# Patient Record
Sex: FEMALE | Race: WHITE | Employment: OTHER | URBAN - NONMETROPOLITAN AREA
[De-identification: names, ages, dates, MRNs, and addresses within clinical notes are randomized per-mention and may not be internally consistent; named-entity substitution may affect disease eponyms.]

---

## 2018-06-04 ENCOUNTER — OFFICE VISIT (OUTPATIENT)
Dept: FAMILY MEDICINE CLINIC | Age: 63
End: 2018-06-04
Payer: COMMERCIAL

## 2018-06-04 VITALS
OXYGEN SATURATION: 97 % | WEIGHT: 186.6 LBS | TEMPERATURE: 97.3 F | SYSTOLIC BLOOD PRESSURE: 146 MMHG | BODY MASS INDEX: 34.34 KG/M2 | RESPIRATION RATE: 12 BRPM | DIASTOLIC BLOOD PRESSURE: 82 MMHG | HEIGHT: 62 IN | HEART RATE: 57 BPM

## 2018-06-04 DIAGNOSIS — C50.919: Primary | ICD-10-CM

## 2018-06-04 DIAGNOSIS — Z17.0 MALIGNANT NEOPLASM OF LOWER-OUTER QUADRANT OF LEFT BREAST OF FEMALE, ESTROGEN RECEPTOR POSITIVE (HCC): ICD-10-CM

## 2018-06-04 DIAGNOSIS — Z12.31 ENCOUNTER FOR SCREENING MAMMOGRAM FOR BREAST CANCER: ICD-10-CM

## 2018-06-04 DIAGNOSIS — R79.89 LOW VITAMIN D LEVEL: ICD-10-CM

## 2018-06-04 DIAGNOSIS — Z23 NEED FOR PROPHYLACTIC VACCINATION AND INOCULATION AGAINST VARICELLA: ICD-10-CM

## 2018-06-04 DIAGNOSIS — Z13.220 SCREENING CHOLESTEROL LEVEL: ICD-10-CM

## 2018-06-04 DIAGNOSIS — E16.2 HYPOGLYCEMIA: ICD-10-CM

## 2018-06-04 DIAGNOSIS — Z23 NEED FOR PROPHYLACTIC VACCINATION AGAINST DIPHTHERIA-TETANUS-PERTUSSIS (DTP): ICD-10-CM

## 2018-06-04 DIAGNOSIS — C50.512 MALIGNANT NEOPLASM OF LOWER-OUTER QUADRANT OF LEFT BREAST OF FEMALE, ESTROGEN RECEPTOR POSITIVE (HCC): ICD-10-CM

## 2018-06-04 DIAGNOSIS — I10 ESSENTIAL HYPERTENSION: ICD-10-CM

## 2018-06-04 DIAGNOSIS — E04.9 ENLARGED THYROID: ICD-10-CM

## 2018-06-04 DIAGNOSIS — R07.89 CHEST HEAVINESS: ICD-10-CM

## 2018-06-04 DIAGNOSIS — Z12.11 COLON CANCER SCREENING: ICD-10-CM

## 2018-06-04 DIAGNOSIS — Z00.00 WELLNESS EXAMINATION: ICD-10-CM

## 2018-06-04 LAB
ALBUMIN SERPL-MCNC: 4.4 G/DL (ref 3.5–5.1)
ALP BLD-CCNC: 91 U/L (ref 38–126)
ALT SERPL-CCNC: 15 U/L (ref 11–66)
ANION GAP SERPL CALCULATED.3IONS-SCNC: 12 MEQ/L (ref 8–16)
ANISOCYTOSIS: ABNORMAL
AST SERPL-CCNC: 17 U/L (ref 5–40)
BASOPHILS # BLD: 0.7 %
BASOPHILS ABSOLUTE: 0 THOU/MM3 (ref 0–0.1)
BILIRUB SERPL-MCNC: 0.4 MG/DL (ref 0.3–1.2)
BILIRUBIN DIRECT: < 0.2 MG/DL (ref 0–0.3)
BUN BLDV-MCNC: 9 MG/DL (ref 7–22)
CALCIUM SERPL-MCNC: 9 MG/DL (ref 8.5–10.5)
CHLORIDE BLD-SCNC: 106 MEQ/L (ref 98–111)
CHOLESTEROL, TOTAL: 167 MG/DL (ref 100–199)
CO2: 26 MEQ/L (ref 23–33)
CREAT SERPL-MCNC: 0.5 MG/DL (ref 0.4–1.2)
EOSINOPHIL # BLD: 3.1 %
EOSINOPHILS ABSOLUTE: 0.2 THOU/MM3 (ref 0–0.4)
GFR SERPL CREATININE-BSD FRML MDRD: > 90 ML/MIN/1.73M2
GLUCOSE BLD-MCNC: 84 MG/DL (ref 70–108)
HBA1C MFR BLD: 5.5 %
HCT VFR BLD CALC: 44 % (ref 37–47)
HDLC SERPL-MCNC: 71 MG/DL
HEMOGLOBIN: 14.4 GM/DL (ref 12–16)
LDL CHOLESTEROL CALCULATED: 80 MG/DL
LYMPHOCYTES # BLD: 31.9 %
LYMPHOCYTES ABSOLUTE: 1.9 THOU/MM3 (ref 1–4.8)
MAGNESIUM: 2.3 MG/DL (ref 1.6–2.4)
MCH RBC QN AUTO: 28.6 PG (ref 27–31)
MCHC RBC AUTO-ENTMCNC: 32.6 GM/DL (ref 33–37)
MCV RBC AUTO: 87.8 FL (ref 81–99)
MONOCYTES # BLD: 6.9 %
MONOCYTES ABSOLUTE: 0.4 THOU/MM3 (ref 0.4–1.3)
NUCLEATED RED BLOOD CELLS: 0 /100 WBC
PDW BLD-RTO: 14.5 % (ref 11.5–14.5)
PLATELET # BLD: 269 THOU/MM3 (ref 130–400)
PMV BLD AUTO: 8.9 FL (ref 7.4–10.4)
POTASSIUM SERPL-SCNC: 4.6 MEQ/L (ref 3.5–5.2)
RBC # BLD: 5.01 MILL/MM3 (ref 4.2–5.4)
SEG NEUTROPHILS: 57.4 %
SEGMENTED NEUTROPHILS ABSOLUTE COUNT: 3.5 THOU/MM3 (ref 1.8–7.7)
SODIUM BLD-SCNC: 144 MEQ/L (ref 135–145)
TOTAL PROTEIN: 7.6 G/DL (ref 6.1–8)
TRIGL SERPL-MCNC: 81 MG/DL (ref 0–199)
TSH SERPL DL<=0.05 MIU/L-ACNC: 2.45 UIU/ML (ref 0.4–4.2)
VITAMIN D 25-HYDROXY: 26 NG/ML (ref 30–100)
WBC # BLD: 6.1 THOU/MM3 (ref 4.8–10.8)

## 2018-06-04 PROCEDURE — 93000 ELECTROCARDIOGRAM COMPLETE: CPT | Performed by: FAMILY MEDICINE

## 2018-06-04 PROCEDURE — 36415 COLL VENOUS BLD VENIPUNCTURE: CPT | Performed by: FAMILY MEDICINE

## 2018-06-04 PROCEDURE — 99203 OFFICE O/P NEW LOW 30 MIN: CPT | Performed by: FAMILY MEDICINE

## 2018-06-04 PROCEDURE — 83036 HEMOGLOBIN GLYCOSYLATED A1C: CPT | Performed by: FAMILY MEDICINE

## 2018-06-04 RX ORDER — VITAMIN B COMPLEX
1 CAPSULE ORAL DAILY
COMMUNITY

## 2018-06-04 RX ORDER — VITAMIN E 268 MG
400 CAPSULE ORAL DAILY
COMMUNITY

## 2018-06-04 ASSESSMENT — ENCOUNTER SYMPTOMS
CONSTIPATION: 0
TROUBLE SWALLOWING: 0
ABDOMINAL PAIN: 0
SHORTNESS OF BREATH: 0
BLOOD IN STOOL: 0
VOMITING: 0
EYE PAIN: 0
NAUSEA: 0
COUGH: 0
DIARRHEA: 0

## 2018-06-04 ASSESSMENT — PATIENT HEALTH QUESTIONNAIRE - PHQ9
2. FEELING DOWN, DEPRESSED OR HOPELESS: 0
1. LITTLE INTEREST OR PLEASURE IN DOING THINGS: 0
SUM OF ALL RESPONSES TO PHQ QUESTIONS 1-9: 0
SUM OF ALL RESPONSES TO PHQ9 QUESTIONS 1 & 2: 0

## 2018-06-05 ENCOUNTER — TELEPHONE (OUTPATIENT)
Dept: FAMILY MEDICINE CLINIC | Age: 63
End: 2018-06-05

## 2018-06-06 LAB
THYROGLOBULIN: NORMAL
THYROID PEROXIDASE ANTIBODY: 0.5 IU/ML (ref 0–9)
THYROXINE (T4): 8 UG/DL (ref 4.5–12)

## 2018-06-07 ENCOUNTER — TELEPHONE (OUTPATIENT)
Dept: FAMILY MEDICINE CLINIC | Age: 63
End: 2018-06-07

## 2018-06-07 DIAGNOSIS — C50.919: ICD-10-CM

## 2018-06-07 DIAGNOSIS — Z12.31 ENCOUNTER FOR SCREENING MAMMOGRAM FOR BREAST CANCER: ICD-10-CM

## 2018-06-07 DIAGNOSIS — N64.4 BREAST PAIN: Primary | ICD-10-CM

## 2018-06-07 RX ORDER — LISINOPRIL 10 MG/1
10 TABLET ORAL DAILY
Qty: 30 TABLET | Refills: 3 | Status: SHIPPED | OUTPATIENT
Start: 2018-06-07 | End: 2018-07-02

## 2018-06-13 ENCOUNTER — TELEPHONE (OUTPATIENT)
Dept: FAMILY MEDICINE CLINIC | Age: 63
End: 2018-06-13
Payer: COMMERCIAL

## 2018-06-13 DIAGNOSIS — R19.5 POSITIVE OCCULT STOOL BLOOD TEST: Primary | ICD-10-CM

## 2018-06-13 DIAGNOSIS — Z12.11 COLON CANCER SCREENING: ICD-10-CM

## 2018-06-13 LAB
CONTROL: NORMAL
HEMOCCULT STL QL: NORMAL

## 2018-06-13 PROCEDURE — 82274 ASSAY TEST FOR BLOOD FECAL: CPT | Performed by: FAMILY MEDICINE

## 2018-06-14 ENCOUNTER — TELEPHONE (OUTPATIENT)
Dept: FAMILY MEDICINE CLINIC | Age: 63
End: 2018-06-14

## 2018-06-25 ENCOUNTER — OFFICE VISIT (OUTPATIENT)
Dept: FAMILY MEDICINE CLINIC | Age: 63
End: 2018-06-25
Payer: COMMERCIAL

## 2018-06-25 ENCOUNTER — HOSPITAL ENCOUNTER (OUTPATIENT)
Dept: WOMENS IMAGING | Age: 63
Discharge: HOME | End: 2018-06-25
Payer: COMMERCIAL

## 2018-06-25 ENCOUNTER — APPOINTMENT (OUTPATIENT)
Dept: WOMENS IMAGING | Age: 63
End: 2018-06-25
Payer: COMMERCIAL

## 2018-06-25 VITALS
BODY MASS INDEX: 33.28 KG/M2 | WEIGHT: 182.6 LBS | DIASTOLIC BLOOD PRESSURE: 79 MMHG | HEART RATE: 59 BPM | SYSTOLIC BLOOD PRESSURE: 138 MMHG

## 2018-06-25 DIAGNOSIS — I10 ESSENTIAL HYPERTENSION: ICD-10-CM

## 2018-06-25 DIAGNOSIS — N64.4 BREAST PAIN: ICD-10-CM

## 2018-06-25 DIAGNOSIS — C50.919: ICD-10-CM

## 2018-06-25 PROCEDURE — G0279 TOMOSYNTHESIS, MAMMO: HCPCS

## 2018-06-25 PROCEDURE — 99211 OFF/OP EST MAY X REQ PHY/QHP: CPT | Performed by: FAMILY MEDICINE

## 2018-06-26 ENCOUNTER — TELEPHONE (OUTPATIENT)
Dept: FAMILY MEDICINE CLINIC | Age: 63
End: 2018-06-26

## 2018-07-02 ENCOUNTER — OFFICE VISIT (OUTPATIENT)
Dept: FAMILY MEDICINE CLINIC | Age: 63
End: 2018-07-02
Payer: COMMERCIAL

## 2018-07-02 VITALS
TEMPERATURE: 97.5 F | OXYGEN SATURATION: 99 % | HEART RATE: 54 BPM | DIASTOLIC BLOOD PRESSURE: 75 MMHG | SYSTOLIC BLOOD PRESSURE: 132 MMHG | HEIGHT: 62 IN | BODY MASS INDEX: 33.82 KG/M2 | RESPIRATION RATE: 10 BRPM | WEIGHT: 183.8 LBS

## 2018-07-02 DIAGNOSIS — I10 ESSENTIAL HYPERTENSION: Primary | ICD-10-CM

## 2018-07-02 DIAGNOSIS — E04.9 ENLARGED THYROID: ICD-10-CM

## 2018-07-02 PROCEDURE — G8427 DOCREV CUR MEDS BY ELIG CLIN: HCPCS | Performed by: FAMILY MEDICINE

## 2018-07-02 PROCEDURE — 3017F COLORECTAL CA SCREEN DOC REV: CPT | Performed by: FAMILY MEDICINE

## 2018-07-02 PROCEDURE — 99213 OFFICE O/P EST LOW 20 MIN: CPT | Performed by: FAMILY MEDICINE

## 2018-07-02 PROCEDURE — G8417 CALC BMI ABV UP PARAM F/U: HCPCS | Performed by: FAMILY MEDICINE

## 2018-07-02 PROCEDURE — 1036F TOBACCO NON-USER: CPT | Performed by: FAMILY MEDICINE

## 2018-07-02 RX ORDER — LOSARTAN POTASSIUM 25 MG/1
25 TABLET ORAL DAILY
Qty: 60 TABLET | Refills: 3 | Status: SHIPPED | OUTPATIENT
Start: 2018-07-02 | End: 2018-11-05 | Stop reason: SDUPTHER

## 2018-07-02 ASSESSMENT — ENCOUNTER SYMPTOMS
DIARRHEA: 0
EYE PAIN: 0
BLOOD IN STOOL: 0
SHORTNESS OF BREATH: 0
VOMITING: 0
COUGH: 1
TROUBLE SWALLOWING: 0
CONSTIPATION: 0
ABDOMINAL PAIN: 0
NAUSEA: 0

## 2018-07-02 NOTE — PROGRESS NOTES
06[birth year]14/1975    Shingles Vaccine (1 of 2 - 2 Dose Series) 06/14/2004    Flu vaccine (1) 09/01/2018    Potassium monitoring  06/04/2019    Creatinine monitoring  06/04/2019    Colon Cancer Screen FIT/FOBT  06/13/2019    Breast cancer screen  06/25/2020    Lipid screen  06/04/2023       Subjective:      Review of Systems   Constitutional: Negative for chills, fatigue and fever. HENT: Negative for ear pain, postnasal drip and trouble swallowing. Eyes: Negative for pain and visual disturbance. Respiratory: Positive for cough. Negative for shortness of breath. Cardiovascular: Negative for chest pain and palpitations. Gastrointestinal: Negative for abdominal pain, blood in stool, constipation, diarrhea, nausea and vomiting. Skin: Negative for rash. Neurological: Negative for headaches. Objective:     Vitals:    07/02/18 0939   BP: 132/75   Site: Left Arm   Position: Sitting   Cuff Size: Large Adult   Pulse: 54   Resp: 10   Temp: 97.5 °F (36.4 °C)   TempSrc: Oral   SpO2: 99%   Weight: 183 lb 12.8 oz (83.4 kg)   Height: 5' 2\" (1.575 m)       Body mass index is 33.62 kg/m². Wt Readings from Last 3 Encounters:   07/02/18 183 lb 12.8 oz (83.4 kg)   06/25/18 182 lb 9.6 oz (82.8 kg)   06/04/18 186 lb 9.6 oz (84.6 kg)     BP Readings from Last 3 Encounters:   07/02/18 132/75   06/25/18 138/79   06/04/18 (!) 146/82       Physical Exam   Constitutional: She is oriented to person, place, and time. She appears well-developed and well-nourished. No distress. HENT:   Head: Normocephalic and atraumatic. Right Ear: External ear normal.   Left Ear: External ear normal.   Eyes: Conjunctivae and EOM are normal. Right eye exhibits no discharge. Left eye exhibits no discharge. No scleral icterus. Neck: Normal range of motion. Pulmonary/Chest: Effort normal.   Musculoskeletal: She exhibits no edema. Neurological: She is alert and oriented to person, place, and time. No cranial nerve deficit.    Skin: Skin is warm and dry. No rash noted. She is not diaphoretic. No erythema. Psychiatric: She has a normal mood and affect. Her behavior is normal. Judgment and thought content normal.   Nursing note and vitals reviewed. Lab Results   Component Value Date    WBC 6.1 06/04/2018    HGB 14.4 06/04/2018    HCT 44.0 06/04/2018     06/04/2018    CHOL 167 06/04/2018    TRIG 81 06/04/2018    HDL 71 06/04/2018    LDLCALC 80 06/04/2018    AST 17 06/04/2018     06/04/2018    K 4.6 06/04/2018     06/04/2018    CREATININE 0.5 06/04/2018    BUN 9 06/04/2018    CO2 26 06/04/2018    TSH 2.450 06/04/2018    LABA1C 5.5 06/04/2018    LABGLOM >90 06/04/2018    MG 2.3 06/04/2018    CALCIUM 9.0 06/04/2018    VITD25 26 (L) 06/04/2018       Imaging Results:    [facility] Digital Diagnostic W Or Wo Cad Bilateral    Result Date: 6/25/2018  IMPRESSION: Mammogram BI-RADS: 3: Probably Benign The irregular equal density architectural distortion in the left breast resembles a post surgical scar and appears probably benign. A follow-up in 6 months is recommended. A follow-up left mammogram in 6 months is recommended to demonstrate stability. The patient has been or will be contacted. #[id number] - [facility] DIGITAL DIAGNOSTIC W OR WO CAD BILATERAL BILATERAL DIGITAL DIAGNOSTIC MAMMOGRAM 3D/2D WITH CAD: 6/25/2018 CLINICAL: Tomosynthesis. Personal history of breast cancer. Meets  criteria for genetic evaluation and has been offered information  for possible referral. Pain bilateral breasts. No prior exams were available for comparison. The tissue of both breasts is predominantly fatty. Current study was also evaluated with a Computer Aided Detection (CAD) system. There is irregular equal density architectural distortion with an  indistinct margin in the left breast posterior depth upper region seen on the mediolateral oblique view only.   No other significant masses, calcifications, or other findings are seen in either breast. River Leon M.D.          Westley Baconock 09:86:34  Imaging Technologist: Shaheen Ray RT(R), [id number] [facility] sent: 6 Month Followup Recommended           Assessment:      Diagnosis Orders   1. Essential hypertension  losartan (COZAAR) 25 MG tablet    Basic Metabolic Panel   2. Enlarged thyroid         Plan: Will switch to cozaar from the lisinopril will start at 25 mg and then can go up to 50mg if the 25 are not helping - will    Will try the ketogenic diet - no carbs     Will stay on the magnesium     Can do the meal replacement with the B vitamins    Will check the vit D dose you are taking - will double the dose or at least take 3,000 more a day     a little more fish oil when you are eating the nuts and seeds    Will see you back in 4 months and will have you check in with the blood pressures over the phone       Return in about 4 months (around 11/2/2018) for follow up chronic conditions. Orders Placed:  Orders Placed This Encounter   Procedures    Basic Metabolic Panel     Medications Prescribed:  Orders Placed This Encounter   Medications    losartan (COZAAR) 25 MG tablet     Sig: Take 1 tablet by mouth daily Will go up to 2 a day if the blood pressure is not below 120/80     Dispense:  60 tablet     Refill:  3       No future appointments. Patient given educational materials - see patient instructions. Discussed use, benefit, and side effects of prescribed medications. All patient questions answered. Pt voiced understanding. Reviewed health maintenance. Instructed to continue current medications, diet and exercise. Patient agreed with treatment plan. Follow up as directed.      Electronically signed by Candi Chew DO on 7/2/2018 at 10:35 AM

## 2018-07-02 NOTE — PATIENT INSTRUCTIONS
help?  Call 911 anytime you think you may need emergency care. For example, call if:    · You passed out (lost consciousness).    Call your doctor now or seek immediate medical care if:    · You have new or worse nausea and vomiting.     · You have much less urine than normal, or you have no urine.     · You are feeling confused or cannot think clearly.     · You have new or more blood in your urine.     · You have new swelling.     · You are dizzy or lightheaded, or you feel like you may faint.    Watch closely for changes in your health, and be sure to contact your doctor if:    · You do not get better as expected. Where can you learn more? Go to [email or web address] and sign in to your [employer] account. Enter V484 in the Tiqets box to learn more about \"Kidney Disease and High Blood Pressure: Care Instructions. \"     If you do not have an account, please click on the \"Sign Up Now\" link. Current as of: May 12, 2017  Content Version: 11.6  © 4828-3658 [employer]. Care instructions adapted under license by [employer] ([facility]). If you have questions about a medical condition or this instruction, always ask your healthcare professional. [address] any warranty or liability for your use of this information. Patient Education            Current as of: October 5, 2017  Content Version: 11.6  © 6426-0651 [employer]. Care instructions adapted under license by [employer] ([facility]). If you have questions about a medical condition or this instruction, always ask your healthcare professional. [address] any warranty or liability for your use of this information. Patient Education            Current as of: October 5, 2017  Content Version: 11.6  © 4377-3741 [employer]. Care instructions adapted under license by [employer] ([facility]).  If you have questions about a medical condition or this instruction, always ask

## 2018-08-31 ENCOUNTER — CLINICAL DOCUMENTATION (OUTPATIENT)
Dept: OTHER | Facility: CLINIC | Age: 63
End: 2018-08-31

## 2018-11-05 ENCOUNTER — OFFICE VISIT (OUTPATIENT)
Dept: FAMILY MEDICINE CLINIC | Age: 63
End: 2018-11-05
Payer: COMMERCIAL

## 2018-11-05 VITALS
RESPIRATION RATE: 12 BRPM | TEMPERATURE: 97.7 F | HEART RATE: 73 BPM | WEIGHT: 176.2 LBS | SYSTOLIC BLOOD PRESSURE: 124 MMHG | OXYGEN SATURATION: 98 % | DIASTOLIC BLOOD PRESSURE: 64 MMHG | HEIGHT: 62 IN | BODY MASS INDEX: 32.42 KG/M2

## 2018-11-05 DIAGNOSIS — I10 ESSENTIAL HYPERTENSION: ICD-10-CM

## 2018-11-05 PROCEDURE — G8427 DOCREV CUR MEDS BY ELIG CLIN: HCPCS | Performed by: FAMILY MEDICINE

## 2018-11-05 PROCEDURE — G8484 FLU IMMUNIZE NO ADMIN: HCPCS | Performed by: FAMILY MEDICINE

## 2018-11-05 PROCEDURE — G8417 CALC BMI ABV UP PARAM F/U: HCPCS | Performed by: FAMILY MEDICINE

## 2018-11-05 PROCEDURE — 99213 OFFICE O/P EST LOW 20 MIN: CPT | Performed by: FAMILY MEDICINE

## 2018-11-05 PROCEDURE — 1036F TOBACCO NON-USER: CPT | Performed by: FAMILY MEDICINE

## 2018-11-05 PROCEDURE — 3017F COLORECTAL CA SCREEN DOC REV: CPT | Performed by: FAMILY MEDICINE

## 2018-11-05 RX ORDER — LOSARTAN POTASSIUM 25 MG/1
25 TABLET ORAL DAILY
Qty: 90 TABLET | Refills: 3 | Status: SHIPPED | OUTPATIENT
Start: 2018-11-05 | End: 2019-11-18 | Stop reason: SDUPTHER

## 2018-11-05 ASSESSMENT — ENCOUNTER SYMPTOMS
SHORTNESS OF BREATH: 0
ABDOMINAL PAIN: 0
NAUSEA: 0
VOMITING: 0
CONSTIPATION: 0
COUGH: 0
BLOOD IN STOOL: 0
EYE PAIN: 0
DIARRHEA: 0
TROUBLE SWALLOWING: 0

## 2019-07-29 ENCOUNTER — OFFICE VISIT (OUTPATIENT)
Dept: FAMILY MEDICINE CLINIC | Age: 64
End: 2019-07-29
Payer: MEDICARE

## 2019-07-29 VITALS
OXYGEN SATURATION: 99 % | SYSTOLIC BLOOD PRESSURE: 130 MMHG | TEMPERATURE: 97.7 F | RESPIRATION RATE: 12 BRPM | DIASTOLIC BLOOD PRESSURE: 82 MMHG | WEIGHT: 185.8 LBS | HEIGHT: 62 IN | HEART RATE: 78 BPM | BODY MASS INDEX: 34.19 KG/M2

## 2019-07-29 DIAGNOSIS — K90.9 INTESTINAL MALABSORPTION, UNSPECIFIED TYPE: ICD-10-CM

## 2019-07-29 DIAGNOSIS — I10 ESSENTIAL HYPERTENSION: Primary | ICD-10-CM

## 2019-07-29 DIAGNOSIS — Z00.00 ROUTINE GENERAL MEDICAL EXAMINATION AT A HEALTH CARE FACILITY: ICD-10-CM

## 2019-07-29 DIAGNOSIS — Z11.59 NEED FOR HEPATITIS C SCREENING TEST: ICD-10-CM

## 2019-07-29 DIAGNOSIS — Z86.010 HISTORY OF COLONIC POLYPS: ICD-10-CM

## 2019-07-29 DIAGNOSIS — C50.919: ICD-10-CM

## 2019-07-29 DIAGNOSIS — Z13.220 SCREENING, LIPID: ICD-10-CM

## 2019-07-29 DIAGNOSIS — Z11.4 ENCOUNTER FOR SCREENING FOR HIV: ICD-10-CM

## 2019-07-29 DIAGNOSIS — Z78.0 MENOPAUSE: ICD-10-CM

## 2019-07-29 DIAGNOSIS — M51.9 LUMBAR DISC DISEASE: ICD-10-CM

## 2019-07-29 PROBLEM — Z86.0100 HISTORY OF COLONIC POLYPS: Status: ACTIVE | Noted: 2019-07-29

## 2019-07-29 PROCEDURE — G0402 INITIAL PREVENTIVE EXAM: HCPCS | Performed by: FAMILY MEDICINE

## 2019-07-29 PROCEDURE — 4040F PNEUMOC VAC/ADMIN/RCVD: CPT | Performed by: FAMILY MEDICINE

## 2019-07-29 PROCEDURE — 3017F COLORECTAL CA SCREEN DOC REV: CPT | Performed by: FAMILY MEDICINE

## 2019-07-29 PROCEDURE — 1123F ACP DISCUSS/DSCN MKR DOCD: CPT | Performed by: FAMILY MEDICINE

## 2019-07-29 SDOH — HEALTH STABILITY: MENTAL HEALTH: HOW OFTEN DO YOU HAVE A DRINK CONTAINING ALCOHOL?: MONTHLY OR LESS

## 2019-07-29 SDOH — HEALTH STABILITY: MENTAL HEALTH: HOW MANY STANDARD DRINKS CONTAINING ALCOHOL DO YOU HAVE ON A TYPICAL DAY?: 1 OR 2

## 2019-07-29 ASSESSMENT — ENCOUNTER SYMPTOMS
COUGH: 0
DIARRHEA: 0
ABDOMINAL PAIN: 0
SHORTNESS OF BREATH: 0
NAUSEA: 0
EYE PAIN: 0
TROUBLE SWALLOWING: 0
CONSTIPATION: 0
BACK PAIN: 1
BLOOD IN STOOL: 0
VOMITING: 0

## 2019-07-29 ASSESSMENT — LIFESTYLE VARIABLES
HOW OFTEN DURING THE LAST YEAR HAVE YOU FAILED TO DO WHAT WAS NORMALLY EXPECTED FROM YOU BECAUSE OF DRINKING: 0
HOW OFTEN DURING THE LAST YEAR HAVE YOU HAD A FEELING OF GUILT OR REMORSE AFTER DRINKING: 0
HAS A RELATIVE, FRIEND, DOCTOR, OR ANOTHER HEALTH PROFESSIONAL EXPRESSED CONCERN ABOUT YOUR DRINKING OR SUGGESTED YOU CUT DOWN: 0
HOW OFTEN DO YOU HAVE A DRINK CONTAINING ALCOHOL: 2
HAVE YOU OR SOMEONE ELSE BEEN INJURED AS A RESULT OF YOUR DRINKING: 0
HOW OFTEN DO YOU HAVE SIX OR MORE DRINKS ON ONE OCCASION: 0
AUDIT-C TOTAL SCORE: 2
HOW OFTEN DURING THE LAST YEAR HAVE YOU NEEDED AN ALCOHOLIC DRINK FIRST THING IN THE MORNING TO GET YOURSELF GOING AFTER A NIGHT OF HEAVY DRINKING: 0
HOW MANY STANDARD DRINKS CONTAINING ALCOHOL DO YOU HAVE ON A TYPICAL DAY: 0
HOW OFTEN DURING THE LAST YEAR HAVE YOU FOUND THAT YOU WERE NOT ABLE TO STOP DRINKING ONCE YOU HAD STARTED: 0

## 2019-07-29 ASSESSMENT — PATIENT HEALTH QUESTIONNAIRE - PHQ9
SUM OF ALL RESPONSES TO PHQ QUESTIONS 1-9: 0
SUM OF ALL RESPONSES TO PHQ QUESTIONS 1-9: 0

## 2019-08-19 ENCOUNTER — HOSPITAL ENCOUNTER (OUTPATIENT)
Dept: WOMENS IMAGING | Age: 64
Discharge: HOME | End: 2019-08-19
Payer: MEDICARE

## 2019-08-19 ENCOUNTER — NURSE ONLY (OUTPATIENT)
Dept: LAB | Age: 64
End: 2019-08-19

## 2019-08-19 DIAGNOSIS — Z11.59 NEED FOR HEPATITIS C SCREENING TEST: ICD-10-CM

## 2019-08-19 DIAGNOSIS — K90.9 INTESTINAL MALABSORPTION, UNSPECIFIED TYPE: ICD-10-CM

## 2019-08-19 DIAGNOSIS — Z13.220 SCREENING, LIPID: ICD-10-CM

## 2019-08-19 DIAGNOSIS — Z78.0 MENOPAUSE: ICD-10-CM

## 2019-08-19 DIAGNOSIS — Z11.4 ENCOUNTER FOR SCREENING FOR HIV: ICD-10-CM

## 2019-08-19 DIAGNOSIS — I10 ESSENTIAL HYPERTENSION: ICD-10-CM

## 2019-08-19 LAB
ALBUMIN SERPL-MCNC: 4.3 G/DL (ref 3.5–5.1)
ALP BLD-CCNC: 89 U/L (ref 38–126)
ALT SERPL-CCNC: 12 U/L (ref 11–66)
ANION GAP SERPL CALCULATED.3IONS-SCNC: 11 MEQ/L (ref 8–16)
AST SERPL-CCNC: 16 U/L (ref 5–40)
BASOPHILS # BLD: 1.1 %
BASOPHILS ABSOLUTE: 0.1 THOU/MM3 (ref 0–0.1)
BILIRUB SERPL-MCNC: 0.4 MG/DL (ref 0.3–1.2)
BILIRUBIN DIRECT: < 0.2 MG/DL (ref 0–0.3)
BUN BLDV-MCNC: 7 MG/DL (ref 7–22)
CALCIUM SERPL-MCNC: 9 MG/DL (ref 8.5–10.5)
CHLORIDE BLD-SCNC: 103 MEQ/L (ref 98–111)
CHOLESTEROL, TOTAL: 171 MG/DL (ref 100–199)
CO2: 26 MEQ/L (ref 23–33)
CREAT SERPL-MCNC: 0.6 MG/DL (ref 0.4–1.2)
EOSINOPHIL # BLD: 3 %
EOSINOPHILS ABSOLUTE: 0.2 THOU/MM3 (ref 0–0.4)
ERYTHROCYTE [DISTWIDTH] IN BLOOD BY AUTOMATED COUNT: 13.4 % (ref 11.5–14.5)
ERYTHROCYTE [DISTWIDTH] IN BLOOD BY AUTOMATED COUNT: 45.1 FL (ref 35–45)
GFR SERPL CREATININE-BSD FRML MDRD: > 90 ML/MIN/1.73M2
GLUCOSE BLD-MCNC: 77 MG/DL (ref 70–108)
HCT VFR BLD CALC: 43 % (ref 37–47)
HDLC SERPL-MCNC: 67 MG/DL
HEMOGLOBIN: 13.5 GM/DL (ref 12–16)
HEPATITIS C ANTIBODY: NEGATIVE
IMMATURE GRANS (ABS): 0.02 THOU/MM3 (ref 0–0.07)
IMMATURE GRANULOCYTES: 0 %
LDL CHOLESTEROL CALCULATED: 88 MG/DL
LYMPHOCYTES # BLD: 34.7 %
LYMPHOCYTES ABSOLUTE: 2.3 THOU/MM3 (ref 1–4.8)
MAGNESIUM: 2.2 MG/DL (ref 1.6–2.4)
MCH RBC QN AUTO: 28.5 PG (ref 26–33)
MCHC RBC AUTO-ENTMCNC: 31.4 GM/DL (ref 32.2–35.5)
MCV RBC AUTO: 90.7 FL (ref 81–99)
MONOCYTES # BLD: 8.6 %
MONOCYTES ABSOLUTE: 0.6 THOU/MM3 (ref 0.4–1.3)
NUCLEATED RED BLOOD CELLS: 0 /100 WBC
PLATELET # BLD: 273 THOU/MM3 (ref 130–400)
PMV BLD AUTO: 9.7 FL (ref 9.4–12.4)
POTASSIUM SERPL-SCNC: 3.9 MEQ/L (ref 3.5–5.2)
RBC # BLD: 4.74 MILL/MM3 (ref 4.2–5.4)
SEG NEUTROPHILS: 52.3 %
SEGMENTED NEUTROPHILS ABSOLUTE COUNT: 3.5 THOU/MM3 (ref 1.8–7.7)
SODIUM BLD-SCNC: 140 MEQ/L (ref 135–145)
TOTAL PROTEIN: 7.2 G/DL (ref 6.1–8)
TRIGL SERPL-MCNC: 78 MG/DL (ref 0–199)
VITAMIN D 25-HYDROXY: 25 NG/ML (ref 30–100)
WBC # BLD: 6.6 THOU/MM3 (ref 4.8–10.8)

## 2019-08-19 PROCEDURE — 77080 DXA BONE DENSITY AXIAL: CPT

## 2019-08-22 LAB — HIV-2 AB: NEGATIVE

## 2019-08-26 ENCOUNTER — TELEPHONE (OUTPATIENT)
Dept: FAMILY MEDICINE CLINIC | Age: 64
End: 2019-08-26

## 2019-08-26 NOTE — TELEPHONE ENCOUNTER
----- Message from Iggy Cifuentes DO sent at 8/21/2019  8:53 PM EDT -----  The dexa does show osteopenia. Weight lifting and vit D and calcium are recommended - we could do a medication for the thinning bones also . Manuel Gould

## 2019-11-18 DIAGNOSIS — I10 ESSENTIAL HYPERTENSION: ICD-10-CM

## 2019-11-18 RX ORDER — LOSARTAN POTASSIUM 25 MG/1
TABLET ORAL
Qty: 90 TABLET | Refills: 0 | Status: SHIPPED | OUTPATIENT
Start: 2019-11-18 | End: 2020-01-06 | Stop reason: SDUPTHER

## 2020-01-06 ENCOUNTER — TELEPHONE (OUTPATIENT)
Dept: FAMILY MEDICINE CLINIC | Age: 65
End: 2020-01-06

## 2020-01-06 RX ORDER — LOSARTAN POTASSIUM 25 MG/1
TABLET ORAL
Qty: 90 TABLET | Refills: 3 | Status: SHIPPED | OUTPATIENT
Start: 2020-01-06 | End: 2020-02-10 | Stop reason: SDUPTHER

## 2020-02-10 ENCOUNTER — OFFICE VISIT (OUTPATIENT)
Dept: FAMILY MEDICINE CLINIC | Age: 65
End: 2020-02-10
Payer: MEDICARE

## 2020-02-10 VITALS
HEART RATE: 68 BPM | TEMPERATURE: 98 F | BODY MASS INDEX: 33.13 KG/M2 | DIASTOLIC BLOOD PRESSURE: 82 MMHG | OXYGEN SATURATION: 98 % | HEIGHT: 63 IN | WEIGHT: 187 LBS | SYSTOLIC BLOOD PRESSURE: 122 MMHG

## 2020-02-10 PROBLEM — M85.89 OSTEOPENIA OF MULTIPLE SITES: Status: ACTIVE | Noted: 2020-02-10

## 2020-02-10 PROCEDURE — 99214 OFFICE O/P EST MOD 30 MIN: CPT | Performed by: FAMILY MEDICINE

## 2020-02-10 RX ORDER — LOSARTAN POTASSIUM 50 MG/1
50 TABLET ORAL DAILY
Qty: 90 TABLET | Refills: 1 | Status: SHIPPED | OUTPATIENT
Start: 2020-02-10 | End: 2020-08-10 | Stop reason: SDUPTHER

## 2020-02-10 SDOH — ECONOMIC STABILITY: FOOD INSECURITY: WITHIN THE PAST 12 MONTHS, THE FOOD YOU BOUGHT JUST DIDN'T LAST AND YOU DIDN'T HAVE MONEY TO GET MORE.: NEVER TRUE

## 2020-02-10 SDOH — ECONOMIC STABILITY: FOOD INSECURITY: WITHIN THE PAST 12 MONTHS, YOU WORRIED THAT YOUR FOOD WOULD RUN OUT BEFORE YOU GOT MONEY TO BUY MORE.: NEVER TRUE

## 2020-02-10 SDOH — ECONOMIC STABILITY: TRANSPORTATION INSECURITY
IN THE PAST 12 MONTHS, HAS THE LACK OF TRANSPORTATION KEPT YOU FROM MEDICAL APPOINTMENTS OR FROM GETTING MEDICATIONS?: NO

## 2020-02-10 SDOH — ECONOMIC STABILITY: INCOME INSECURITY: HOW HARD IS IT FOR YOU TO PAY FOR THE VERY BASICS LIKE FOOD, HOUSING, MEDICAL CARE, AND HEATING?: NOT HARD AT ALL

## 2020-02-10 SDOH — ECONOMIC STABILITY: TRANSPORTATION INSECURITY
IN THE PAST 12 MONTHS, HAS LACK OF TRANSPORTATION KEPT YOU FROM MEETINGS, WORK, OR FROM GETTING THINGS NEEDED FOR DAILY LIVING?: NO

## 2020-02-10 ASSESSMENT — PATIENT HEALTH QUESTIONNAIRE - PHQ9
1. LITTLE INTEREST OR PLEASURE IN DOING THINGS: 1
SUM OF ALL RESPONSES TO PHQ QUESTIONS 1-9: 2
SUM OF ALL RESPONSES TO PHQ9 QUESTIONS 1 & 2: 2
SUM OF ALL RESPONSES TO PHQ QUESTIONS 1-9: 2
2. FEELING DOWN, DEPRESSED OR HOPELESS: 1

## 2020-02-10 ASSESSMENT — ENCOUNTER SYMPTOMS
ABDOMINAL PAIN: 0
TROUBLE SWALLOWING: 0
COUGH: 0
SHORTNESS OF BREATH: 0
CONSTIPATION: 0
NAUSEA: 0
DIARRHEA: 0
EYE PAIN: 0
BLOOD IN STOOL: 0
VOMITING: 0
BACK PAIN: 1

## 2020-02-10 NOTE — PROGRESS NOTES
[id number] [facility] Raymon BLAKE [address] [address]  Dept: [phone]  Dept Fax: [phone]  Loc: [address] [place] [address] Maintenance Due   Topic Date Due    DTaP/Tdap/Td vaccine (1 - Tdap) 06/14/1965    Cervical cancer screen  06/14/1975    Pneumococcal 65+ years Vaccine (1 of 1 - PPSV23) 06/14/2019   REFUSED     Flu vaccine (1) 09/01/2019           Patient:  López More  Visit Date: 2/10/2020    ANTICIPATORY GUIDANCE : ADULT     WELL QUESTIONS  1. How many cups of caffeine do you drink per day? ALOT cups of soda and ALOT cups of coffee   2. Do you exercise a minimum of 30 minutes per day, above normal activity? No, on average the patient performs 15 minutes of exercise per day    3. Do you eat a minimum of 8-10 servings of fruits and vegetables per day? No, on average the patient consumes 3 servings of fruits and vegetables per day  4. Do you drink alcohol? No  5.  How many oz of water do you drink per day?  (64 oz per day recommended) 32 OZ    EDUCATION PROVIDED  Discussed and/or Handout Given on the following items:            [] Caffeine Use: Limit to 2 cups per day   (400mg of caffeine a day)     [] Exercise: Ideal 30 minutes per day, above normal activity              [] Eating Fruits and Vegetables: Studies show 8-10 servings per day decrease BP  [] Alcohol: Ideal maximum of one cup per day for females and two cups per day for a male         López More is a 71 y.o. female who presents today for:  Chief Complaint   Patient presents with    6 Month Follow-Up    Hypertension       Goals      Blood Pressure < 140/90      Exercise 3x per week (30 min per time)           HPI:     HPI     DOING OK overall - does tend to lose and gain 10 pounds at a time    The cozaar is doing fine taking 2 a day of them to keep the bp down b- takes it every few weeks - usually 120s/80s - as high as 137     Taking the magnesium for the bp also    The back pain is on and off - if she uses it more - it hurts more and she uses the aleve    Has some numbness in her leg - worse at different parts and better other times    Will occasionally drag the left leg and scuff it when walking - but it gets better and worse    taking 50 mg of the DHEA at a time every other day or so she is exhausted all the time - can't get enough sleep now - was better in [place] with the sunshine but back again    No question data found.     Past Medical History:   Diagnosis Date    Cancer [facility])     breast      Past Surgical History:   Procedure Laterality Date    APPENDECTOMY      BREAST LUMPECTOMY       SECTION      x 2    TUBAL LIGATION      WISDOM TOOTH EXTRACTION       Family History   Problem Relation Age of Onset    Heart Disease Mother     Heart Disease Father     Cancer Sister     High Blood Pressure Sister     Obesity Brother     Diabetes Brother     High Blood Pressure Brother     Cancer Sister         melanoma    High Blood Pressure Sister     Substance Abuse Sister         smoker    High Blood Pressure Sister     High Blood Pressure Brother     Heart Attack Brother      Social History     Tobacco Use    Smoking status: Former Smoker     Packs/day: 0.02     Years: 0.10     Pack years: 0.00     Types: Cigarettes     Last attempt to quit: 1972     Years since quittin.6    Smokeless tobacco: Former User   Substance Use Topics    Alcohol use: Yes     Frequency: Monthly or less     Drinks per session: 1 or 2     Comment: occassionally      Current Outpatient Medications   Medication Sig Dispense Refill    MAGNESIUM PO Take 400 mg by mouth daily      losartan (COZAAR) 25 MG tablet TAKE 1 TABLET BY MOUTH DAILY WILL GO UP TO 2 TABLETS A DAY IF THE BLOOD PRESSURE IS NOT BELOW 120/80 (Patient taking differently: Take 50 mg by mouth daily TAKE 1 TABLET BY MOUTH DAILY WILL GO UP TO 2 TABLETS A DAY IF THE BLOOD PRESSURE IS NOT Skin: Negative for rash. Neurological: Negative for headaches. Psychiatric/Behavioral: Negative for agitation. Objective:     Vitals:    02/10/20 1127   BP: 122/82   Site: Left Upper Arm   Position: Sitting   Cuff Size: Medium Adult   Pulse: 68   Temp: 98 °F (36.7 °C)   TempSrc: Oral   SpO2: 98%   Weight: 187 lb (84.8 kg)   Height: 5' 3\" (1.6 m)       Body mass index is 33.13 kg/m². Wt Readings from Last 3 Encounters:   02/10/20 187 lb (84.8 kg)   07/29/19 185 lb 12.8 oz (84.3 kg)   11/05/18 176 lb 3.2 oz (79.9 kg)     BP Readings from Last 3 Encounters:   02/10/20 122/82   07/29/19 130/82   11/05/18 124/64       Physical Exam  Vitals signs and nursing note reviewed. Constitutional:       General: She is not in acute distress. Appearance: She is well-developed. She is not diaphoretic. HENT:      Head: Normocephalic and atraumatic. Right Ear: External ear normal.      Left Ear: External ear normal.   Eyes:      General: No scleral icterus. Right eye: No discharge. Left eye: No discharge. Conjunctiva/sclera: Conjunctivae normal.   Neck:      Musculoskeletal: Normal range of motion. Cardiovascular:      Rate and Rhythm: Normal rate and regular rhythm. Heart sounds: Normal heart sounds. No murmur. Pulmonary:      Effort: Pulmonary effort is normal.      Breath sounds: Normal breath sounds. Skin:     General: Skin is warm and dry. Findings: No erythema or rash. Neurological:      Mental Status: She is alert and oriented to person, place, and time. Cranial Nerves: No cranial nerve deficit. Psychiatric:         Behavior: Behavior normal.         Thought Content:  Thought content normal.         Judgment: Judgment normal.           Lab Results   Component Value Date    WBC 6.6 08/19/2019    HGB 13.5 08/19/2019    HCT 43.0 08/19/2019     08/19/2019    CHOL 171 08/19/2019    TRIG 78 08/19/2019    HDL 67 08/19/2019    LDLCALC 88 08/19/2019 BMD in 1-2    years and patient consult w/primary care provider are    recommended.         Jatinder Tompkins M.D., rd/perfecto:8/19/2019 22:30:23         Imaging Technologist: Yuli LUGO)(LARRY), [facility]             Assessment:      Diagnosis Orders   1. Lumbar disc disease     2. Essential hypertension  losartan (COZAAR) 25 MG tablet   3. Osteopenia of multiple sites     4. Enlarged thyroid         Plan:         Can order the PT for the back and if that does not help will do the MRI of the lumbar and may send for injections    Will refill the meds for the blood pressure    Will add back the yoga for the low back and exercises before the PT    Recheck blood work in august and will have the dhea checked at that time    Will recheck the dexa  In 2021 for osteopenia    Can check on the gluten sensitivity - and keep watching the thyroid and will check it in the fall with bloodwork    will do some bloodwork in the future       No follow-ups on file. Orders Placed:  No orders of the defined types were placed in this encounter. Medications Prescribed:  No orders of the defined types were placed in this encounter. No future appointments. Patient given educational materials - see patient instructions. Discussed use, benefit, and sideeffects of prescribed medications. All patient questions answered. Pt voiced understanding. Reviewed health maintenance. Instructed to continue current medications, diet and exercise. Patient agreed with treatment plan. Follow up as directed. I was present for the key portions of the exam and history and confirmed all areas of the note with the patient, staff and the student.       Electronically signed by Nunu Lorenzo DO on 2/10/2020 at 12:17 PM

## 2020-02-10 NOTE — PATIENT INSTRUCTIONS
Thank you   1. Thank you for trusting us with your healthcare needs. You may receive a survey regarding today's visit. It would help us out if you would take a few moments to provide your feedback. We value your input. 2. Please bring in ALL medications BOTTLES, including inhalers, herbal supplements, over the counter, prescribed & non-prescribed medicine. The office would like actual medication bottles and a list.   3. Please note our OFFICE POLICIES:   a. Prior to getting your labs drawn, please check with your insurance company for benefits and eligibility of lab services. Often, insurance companies cover certain tests for preventative visits only. It is patient's responsibility to see what is covered. b. We are unable to change a diagnosis after the test has been performed. c. Lab orders will not be re-printed. Please hold onto your original lab orders and take them to your lab to be completed. d. If you no show your scheduled appointment three times, you will be dismissed from this practice. e. Sapna Xiomara must be completed 24 hours prior to your schedule appointment. 4. If the list below has been completed, PLEASE FAX RECORDS TO OUR OFFICE @ [phone]. Once the records have been received we will update your records at our office:  Health Maintenance Due   Topic Date Due    DTaP/Tdap/Td vaccine (1 - Tdap) [birth year]/1965    Cervical cancer screen  06/14/1975    Pneumococcal 65+ years Vaccine (1 of 1 - PPSV23) 06/14/2019    Flu vaccine (1) 09/01/2019             Patient Education        Learning About Relief for Back Pain  What is back tension and strain? Back strain happens when you overstretch, or pull, a muscle in your back. You may hurt your back in an accident or when you exercise or lift something. Most back pain will get better with rest and time. You can take care of yourself at home to help your back heal.  What can you do first to relieve back pain?   When you first feel back pain, try these steps:  · Walk. Take a short walk (10 to 20 minutes) on a level surface (no slopes, hills, or stairs) every 2 to 3 hours. Walk only distances you can manage without pain, especially leg pain. · Relax. Find a comfortable position for rest. Some people are comfortable on the floor or a medium-firm bed with a small pillow under their head and another under their knees. Some people prefer to lie on their side with a pillow between their knees. Don't stay in one position for too long. · Try heat or ice. Try using a heating pad on a low or medium setting, or take a warm shower, for 15 to 20 minutes every 2 to 3 hours. Or you can buy single-use heat wraps that last up to 8 hours. You can also try an ice pack for 10 to 15 minutes every 2 to 3 hours. You can use an ice pack or a bag of frozen vegetables wrapped in a thin towel. There is not strong evidence that either heat or ice will help, but you can try them to see if they help. You may also want to try switching between heat and cold. · Take pain medicine exactly as directed. ? If the doctor gave you a prescription medicine for pain, take it as prescribed. ? If you are not taking a prescription pain medicine, ask your doctor if you can take an over-the-counter medicine. What else can you do? · Stretch and exercise. Exercises that increase flexibility may relieve your pain and make it easier for your muscles to keep your spine in a good, neutral position. And don't forget to keep walking. · Do self-massage. You can use self-massage to unwind after work or school or to energize yourself in the morning. You can easily massage your feet, hands, or neck. Self-massage works best if you are in comfortable clothes and are sitting or lying in a comfortable position. Use oil or lotion to massage bare skin. · Reduce stress. Back pain can lead to a vicious [place]: Distress about the pain tenses the muscles in your back, which in turn causes more pain.  Learn how to relax your mind and your muscles to lower your stress. Where can you learn more? Go to https://chpepiceweb.Azingo. org and sign in to your Ingogo account. Enter S963 in the Skills Matter box to learn more about \"Learning About Relief for Back Pain. \"     If you do not have an account, please click on the \"Sign Up Now\" link. Current as of: June 26, 2019  Content Version: 12.3  © 5060-7445 Healthwise, Ohlalapps. Care instructions adapted under license by [facility] ([facility]). If you have questions about a medical condition or this instruction, always ask your healthcare professional. [place] [address] any warranty or liability for your use of this information. Patient Education        Therapeutic Ball: Back Exercises  Introduction  Here are some examples of typical exercises for your condition. Start each exercise slowly. Ease off the exercise if you start to have pain. Your doctor or physical therapist will tell you when you can start these exercises and which ones will work best for you. To prepare, make sure that your ball is the right size for you. When inflated and firm, it should allow you to sit with your hips and knees bent at about a 90-degree angle (like the letter L). How to do the exercises  Seated position on ball   1. Use this exercise to get used to moving on the ball and to find your best sitting position. 2. Sit comfortably on the ball with your feet about hip-width apart. If you feel unsteady, rest your hands on the ball near your hips. 3. As you do this exercise, try to keep your shoulders and upper body relaxed and still. 4. Using your stomach and back muscles to move your pelvis, roll the ball forward. This will round your back. 5. Still using your stomach and back muscles, roll the ball back. You will arch your back. 6. Repeat this rounding-arching motion a few times. 7. Stop in between the two positions, where your back is not rounded or arched. bridge, and rolling back, a few times in a row. Praying maribel   1. Kneel upright with the ball in front of you. 2. To start, clasp your hands together. Rest them on the ball in front of you. 3. As you do this exercise, keep your back and hips straight and tighten your belly and buttocks muscles. Keep your knees in place. 4. Press on the ball with your arms. Lean forward from the knees. This rolls the ball forward. You will bear most of your weight on your arms. 5. If your back starts to ache, you've gone too far. Pull back a bit. 6. Roll back to the start position. 7. Repeat 8 to 12 times. Walk-out plank on ball   1. Kneel over the ball. Place your hands on the floor in front of you. 2. Walk your hands forward until your legs are straight on the ball. This is the plank position. 3. When in plank position, hold your body straight and tighten your belly and buttocks muscles. Keep your chin slightly tucked. 4. Roll as far forward as you can without losing your balance or letting your hips drop. You may stop with the ball under your thighs, or even under your knees or shins. 5. Hold a few seconds, then walk your hands back and return to the start position. 6. Repeat 8 to 12 times. Push-up with thighs on ball   1. Kneel over the ball. Place your hands on the floor in front of you. 2. Walk your hands forward until your legs are straight on the ball. This is the plank position. 3. When in plank position, hold your body straight and tighten your belly and buttocks muscles. Keep your chin slightly tucked. 4. Roll as far forward as you can without losing your balance or letting your hips drop. You may stop with the ball under your thighs, or even under your knees or shins. 5. Bend your elbows. Slowly lower your body toward the ground as far as you can without losing your balance. 6. If your wrists hurt, try moving your hands a little farther apart so they're not right under your shoulders.   7. Slowly straighten your arms. 8. Do 8 to 12 of these push-ups. Wall squat with ball   1. Stand facing away from a wall. Place your feet about shoulder-width apart. 2. Place the ball between your middle back and the wall. Move your feet out in front of you so they are about a foot in front of your hips. 3. Keep your arms at your sides, or put your hands on your hips. 4. Slowly squat down as if you are going to sit in a chair, rolling your back over the ball as you squat. The ball should move with you but stay pressed into the wall. 5. Be sure that your knees do not go in front of your toes as you squat. 6. Hold for 6 seconds. 7. Slowly rise to your standing position. 8. Repeat 8 to 12 times. Child's pose with ball   1. Kneeling upright with your back straight, rest your hands on the ball in front of you. 2. Breathe out as you bend at the hips, and roll the ball forward. Lower your chest toward the ground, and drop your hips back toward your heels. 3. To stretch your upper back and shoulders, hold this position for 15 to 30 seconds. 4. Repeat 2 to 4 times. Follow-up care is a key part of your treatment and safety. Be sure to make and go to all appointments, and call your doctor if you are having problems. It's also a good idea to know your test results and keep a list of the medicines you take. Where can you learn more? Go to https://Zykispesawyer.Virgin Mobile Latin America. org and sign in to your Collected Inc. account. Enter L035 in the [facility] box to learn more about \"Therapeutic Ball: Back Exercises. \"     If you do not have an account, please click on the \"Sign Up Now\" link. Current as of: June 26, 2019  Content Version: 12.3  © 8357-7602 Healthwise, Incorporated. Care instructions adapted under license by [facility] ([facility]).  If you have questions about a medical condition or this instruction, always ask your healthcare professional. [place] [address] any warranty or liability for your use low back relax completely as you arch up. Don't let your hips or pelvis come off the floor. 3. Hold this position for 15 to 30 seconds. Then relax, and return to the start position. Over time, work up to staying in the press-up position for up to 2 minutes. 4. Repeat 2 to 4 times. 3. Full press-up back extension   1. Lie on your stomach with your face down, keeping your elbows tucked into your sides and under your shoulders. 2. Straighten your elbows, and push your upper body up as far as you can. Allow your lower back to sag. Keep your hips, pelvis, and legs relaxed. 3. Hold this position for 5 seconds, and then relax. 4. Repeat 10 times. Each time, try to raise your upper body a little higher and hold your arms a bit straighter. 4. Backward bend   1. Stand with your feet hip-width apart, and don't lock your knees. Your toes should be pointing forward. 2. Place your hands in the small of your back. 3. Bend backward as far as you can, keeping your knees straight. Hold this position for 2 to 3 seconds. Then return to your starting position. 4. Repeat 2 to 4 times. Each time, try to bend backward a little farther, until you bend backward as far as you can. Follow-up care is a key part of your treatment and safety. Be sure to make and go to all appointments, and call your doctor if you are having problems. It's also a good idea to know your test results and keep a list of the medicines you take. Where can you learn more? Go to https://Milaap Social Venturessilvano.CleanFish. org and sign in to your [employer] (PIROX) account. Enter O323 in the [facility] box to learn more about \"Herniated Disc: Exercises. \"     If you do not have an account, please click on the \"Sign Up Now\" link. Current as of: June 26, 2019  Content Version: 12.3  © 4964-7664 Healthwise, Incorporated. Care instructions adapted under license by [facility] ([facility]).  If you have questions about a medical condition or this instruction, always ask your healthcare professional. [place] [address] any warranty or liability for your use of this information. Patient Education        Learning About Lumbar Epidural Steroid Injections  What is a lumbar epidural steroid injection? A doctor may give you a lumbar epidural steroid injection to try to decrease pain, tingling, or numbness in your back, buttock, or leg. These might be the result of a back or disc problem. The injection goes directly into your epidural space. This is the area in your back around your spinal cord. This injection may have both a local anesthetic and a steroid medicine. Or it may only have a steroid. Local anesthetic medicines numb your nerves right away for a short time. Steroids reduce swelling and pain. But they take a few days to start working. Some people get a series of these injections over weeks or months. How is a lumbar epidural done? The doctor may use an imaging test before or during your injection. This can be an MRI, a CT scan, or an X-ray. These tests can show where your nerve problems are. After finding the right spot, the doctor may inject a numbing medicine into the skin where you will get the steroid injection. Then he or she puts the needle for the steroid into the numbed area. You may feel some pressure. You could feel some stinging or burning during the injection. It takes about 10 to 15 minutes to get this injection. You will probably go home about 20 to 30 minutes after you get it. You will need someone to drive you home. What can you expect after a lumbar epidural?  If your injection had local anesthetic and a steroid, your legs may feel heavy or numb right after. You will probably be able to walk. But you may need to be extra careful. Take care not to lose your balance and be sure to follow your doctor's instructions. If your injection contained local anesthetic, you may feel better right away.  But this pain relief will last only a few hours. Your pain will probably return. This is because the steroids have not started working yet. Before the steroids start to work, your back may be sore for a few days. These injections don't always work. When they do, it takes 1 to 5 days. This pain relief can last for several days to a few months or longer. You may want to do less than normal for a few days. But you may also be able to return to your daily routine. Some people are dizzy or feel sick to their stomach after getting this injection. These symptoms usually do not last very long. If your pain is better, you may be able to keep doing your normal activities or physical therapy. But try not to overdo it, even if your back pain has improved a lot. If your pain is only a little better or if it comes back, your doctor may recommend another injection in a few weeks. If your pain has not changed, talk to your doctor about other treatment choices. Side effects from an epidural steroid injection include headache, fever, or infection. Serious side effects are rare. But they can include stroke, paralysis, or loss of vision. Follow-up care is a key part of your treatment and safety. Be sure to make and go to all appointments, and call your doctor if you are having problems. It's also a good idea to know your test results and keep a list of the medicines you take. Where can you learn more? Go to https://OctreoPharm SciencesadairTaste Guru.Yogome. org and sign in to your HELM Boots account. Enter J236 in the kiwi666 box to learn more about \"Learning About Lumbar Epidural Steroid Injections. \"     If you do not have an account, please click on the \"Sign Up Now\" link. Current as of: June 26, 2019  Content Version: 12.3  © 6867-1827 Healthwise, Incorporated. Care instructions adapted under license by [facility] ([facility]).  If you have questions about a medical condition or this instruction, always ask your healthcare professional. Tomer eCsar disclaims any warranty or liability for your use of this information. Patient Education        Acute Low Back Pain: Exercises  Introduction  Here are some examples of typical rehabilitation exercises for your condition. Start each exercise slowly. Ease off the exercise if you start to have pain. Your doctor or physical therapist will tell you when you can start these exercises and which ones will work best for you. When you are not being active, find a comfortable position for rest. Some people are comfortable on the floor or a medium-firm bed with a small pillow under their head and another under their knees. Some people prefer to lie on their side with a pillow between their knees. Don't stay in one position for too long. Take short walks (10 to 20 minutes) every 2 to 3 hours. Avoid slopes, hills, and stairs until you feel better. Walk only distances you can manage without pain, especially leg pain. How to do the exercises  Back stretches   1. Get down on your hands and knees on the floor. 2. Relax your head and allow it to droop. Round your back up toward the ceiling until you feel a nice stretch in your upper, middle, and lower back. Hold this stretch for as long as it feels comfortable, or about 15 to 30 seconds. 3. Return to the starting position with a flat back while you are on your hands and knees. 4. Let your back sway by pressing your stomach toward the floor. Lift your buttocks toward the ceiling. 5. Hold this position for 15 to 30 seconds. 6. Repeat 2 to 4 times. Follow-up care is a key part of your treatment and safety. Be sure to make and go to all appointments, and call your doctor if you are having problems. It's also a good idea to know your test results and keep a list of the medicines you take. Where can you learn more? Go to https://dina.Just Gotta Make It Advertising. org and sign in to your Snapdeal account.  Enter S671 in the Mandelbrot Project box to learn more about \"Acute Low Back Pain: Exercises. \"     If you do not have an account, please click on the \"Sign Up Now\" link. Current as of: June 26, 2019  Content Version: 12.3  © 5719-2362 PopJam. Care instructions adapted under license by [facility] ([facility]). If you have questions about a medical condition or this instruction, always ask your healthcare professional. [address] any warranty or liability for your use of this information. Patient Education        Lumbar Spinal Stenosis: Care Instructions  Your Care Instructions    Stenosis in the spine is a narrowing of the canal that is around the spinal cord and nerve roots in your back. It can happen as part of aging. Sometimes bone and other tissue grow into this canal and press on the nerves that branch out from the spinal cord. This can cause pain, numbness, and weakness. When it happens in the lower part of your back, it is called lumbar spinal stenosis. It can cause problems in the legs, feet, and rear end (buttocks). You may be able to get relief from the symptoms of spinal stenosis by taking pain medicine. Your doctor may suggest physical therapy and exercises to keep your spine strong and flexible. Some people try steroid shots to reduce swelling. If pain and numbness in your legs are still so bad that you cannot do your normal activities, you may need surgery. Follow-up care is a key part of your treatment and safety. Be sure to make and go to all appointments, and call your doctor if you are having problems. It's also a good idea to know your test results and keep a list of the medicines you take. How can you care for yourself at home? · Take an over-the-counter pain medicine. Nonsteroidal anti-inflammatory drugs (NSAIDs) such as ibuprofen or naproxen seem to work best. But if you can't take NSAIDs, you can try acetaminophen. Be safe with medicines. Read and follow all instructions on the label.   · Do not take two or more pain medicines at the same time unless the doctor told you to. Many pain medicines have acetaminophen, which is Tylenol. Too much acetaminophen (Tylenol) can be harmful. · Stay at a healthy weight. Being overweight puts extra strain on your spine. · Change positions often when you sit or stand. This can ease pain. It may also reduce pressure on the spinal cord and its nerves. · Avoid doing things that make your symptoms worse. Walking downhill and standing for a long time may cause pain. · Stretch and strengthen your back muscles as your doctor or physical therapist recommends. If your doctor says it is okay to do them, these exercises may help. ? Lie on your back with your knees bent. Gently pull one bent knee to your chest. Put that foot back on the floor, and then pull the other knee to your chest.  ? Do pelvic tilts. Lie on your back with your knees bent. Tighten your stomach muscles. Pull your belly button (navel) in and up toward your ribs. You should feel like your back is pressing to the floor and your hips and pelvis are slightly lifting off the floor. Hold for 6 seconds while breathing smoothly. ? Stand with your back flat against a wall. Slowly slide down until your knees are slightly bent. Hold for 10 seconds, then slide back up the wall. · Remove or change anything in your house that may cause you to fall. Keep walkways clear of clutter, electrical cords, and throw rugs. When should you call for help? Call 911 anytime you think you may need emergency care. For example, call if:    · You are unable to move a leg at all.   [facility] your doctor now or seek immediate medical care if:    · You have new or worse symptoms in your legs, belly, or buttocks. Symptoms may include:  ? Numbness or tingling. ? Weakness.   ? Pain.     · You lose bladder or bowel control.    Watch closely for changes in your health, and be sure to contact your doctor if:    · You have a fever, lose weight, or don't feel well.     · You are not getting better as expected. Where can you learn more? Go to https://chpepiceweb.healthRedbooth. org and sign in to your Fliqq account. Enter O853 in the WorkerBee Virtual Assistants box to learn more about \"Lumbar Spinal Stenosis: Care Instructions. \"     If you do not have an account, please click on the \"Sign Up Now\" link. Current as of: June 26, 2019  Content Version: 12.3  © 9663-4012 Healthwise, Incorporated. Care instructions adapted under license by [facility] ([facility]). If you have questions about a medical condition or this instruction, always ask your healthcare professional. [place] [address] any warranty or liability for your use of this information. Patient Education        Low Back Pain: Exercises  Introduction  Here are some examples of exercises for you to try. The exercises may be suggested for a condition or for rehabilitation. Start each exercise slowly. Ease off the exercises if you start to have pain. You will be told when to start these exercises and which ones will work best for you. How to do the exercises  Press-up   1. Lie on your stomach, supporting your body with your forearms. 2. Press your elbows down into the floor to raise your upper back. As you do this, relax your stomach muscles and allow your back to arch without using your back muscles. As your press up, do not let your hips or pelvis come off the floor. 3. Hold for 15 to 30 seconds, then relax. 4. Repeat 2 to 4 times. Alternate arm and leg (bird dog) exercise   1. Start on the floor, on your hands and knees. 2. Tighten your belly muscles. 3. Raise one leg off the floor, and hold it straight out behind you. Be careful not to let your hip drop down, because that will twist your trunk. 4. Hold for about 6 seconds, then lower your leg and switch to the other leg. 5. Repeat 8 to 12 times on each leg. 6. Over time, work up to holding for 10 to 30 seconds each time.   7. If you feel stable and secure with your leg raised, try raising the opposite arm straight out in front of you at the same time. Knee-to-chest exercise   1. Lie on your back with your knees bent and your feet flat on the floor. 2. Bring one knee to your chest, keeping the other foot flat on the floor (or keeping the other leg straight, whichever feels better on your lower back). 3. Keep your lower back pressed to the floor. Hold for at least 15 to 30 seconds. 4. Relax, and lower the knee to the starting position. 5. Repeat with the other leg. Repeat 2 to 4 times with each leg. 6. To get more stretch, put your other leg flat on the floor while pulling your knee to your chest.    Curl-ups   1. Lie on the floor on your back with your knees bent at a 90-degree angle. Your feet should be flat on the floor, about 12 inches from your buttocks. 2. Cross your arms over your chest. If this bothers your neck, try putting your hands behind your neck (not your head), with your elbows spread apart. 3. Slowly tighten your belly muscles and raise your shoulder blades off the floor. 4. Keep your head in line with your body, and do not press your chin to your chest.  5. Hold this position for 1 or 2 seconds, then slowly lower yourself back down to the floor. 6. Repeat 8 to 12 times. Pelvic tilt exercise   1. Lie on your back with your knees bent. 2. \"Brace\" your stomach. This means to tighten your muscles by pulling in and imagining your belly button moving toward your spine. You should feel like your back is pressing to the floor and your hips and pelvis are rocking back. 3. Hold for about 6 seconds while you breathe smoothly. 4. Repeat 8 to 12 times. Heel dig bridging   1. Lie on your back with both knees bent and your ankles bent so that only your heels are digging into the floor. Your knees should be bent about 90 degrees.   2. Then push your heels into the floor, squeeze your buttocks, and lift your hips off the floor until your shoulders, hips, and knees are all in a straight line. 3. Hold for about 6 seconds as you continue to breathe normally, and then slowly lower your hips back down to the floor and rest for up to 10 seconds. 4. Do 8 to 12 repetitions. Hamstring stretch in doorway   1. Lie on your back in a doorway, with one leg through the open door. 2. Slide your leg up the wall to straighten your knee. You should feel a gentle stretch down the back of your leg. 3. Hold the stretch for at least 15 to 30 seconds. Do not arch your back, point your toes, or bend either knee. Keep one heel touching the floor and the other heel touching the wall. 4. Repeat with your other leg. 5. Do 2 to 4 times for each leg. Hip flexor stretch   1. Kneel on the floor with one knee bent and one leg behind you. Place your forward knee over your foot. Keep your other knee touching the floor. 2. Slowly push your hips forward until you feel a stretch in the upper thigh of your rear leg. 3. Hold the stretch for at least 15 to 30 seconds. Repeat with your other leg. 4. Do 2 to 4 times on each side. Wall sit   1. Stand with your back 10 to 12 inches away from a wall. 2. Lean into the wall until your back is flat against it. 3. Slowly slide down until your knees are slightly bent, pressing your lower back into the wall. 4. Hold for about 6 seconds, then slide back up the wall. 5. Repeat 8 to 12 times. Follow-up care is a key part of your treatment and safety. Be sure to make and go to all appointments, and call your doctor if you are having problems. It's also a good idea to know your test results and keep a list of the medicines you take. Where can you learn more? Go to [email or web address]://[email or web address]. org and sign in to your [employer] account. Enter P008 in the [employer] box to learn more about \"Low Back Pain: Exercises. \"     If you do not have an account, please click on the \"Sign Up Now\" link.   Current as of: June

## 2020-04-01 ENCOUNTER — TELEPHONE (OUTPATIENT)
Dept: FAMILY MEDICINE CLINIC | Age: 65
End: 2020-04-01

## 2020-04-01 NOTE — TELEPHONE ENCOUNTER
Received medical record request from [employer]. Request has been submitted to [facility] Medical Records for completion. Please call [phone] to check for status.

## 2020-08-10 ENCOUNTER — OFFICE VISIT (OUTPATIENT)
Dept: FAMILY MEDICINE CLINIC | Age: 65
End: 2020-08-10
Payer: MEDICARE

## 2020-08-10 VITALS
HEIGHT: 63 IN | HEART RATE: 59 BPM | OXYGEN SATURATION: 96 % | WEIGHT: 189 LBS | DIASTOLIC BLOOD PRESSURE: 78 MMHG | TEMPERATURE: 96.7 F | SYSTOLIC BLOOD PRESSURE: 132 MMHG | BODY MASS INDEX: 33.49 KG/M2

## 2020-08-10 PROCEDURE — 99214 OFFICE O/P EST MOD 30 MIN: CPT | Performed by: FAMILY MEDICINE

## 2020-08-10 PROCEDURE — 93000 ELECTROCARDIOGRAM COMPLETE: CPT | Performed by: FAMILY MEDICINE

## 2020-08-10 RX ORDER — LOSARTAN POTASSIUM 50 MG/1
50 TABLET ORAL DAILY
Qty: 90 TABLET | Refills: 1 | Status: SHIPPED | OUTPATIENT
Start: 2020-08-10 | End: 2020-12-14 | Stop reason: SDUPTHER

## 2020-08-10 RX ORDER — PRASTERONE (DHEA) 50 MG
50 TABLET ORAL DAILY
COMMUNITY

## 2020-08-10 RX ORDER — HYDROCHLOROTHIAZIDE 25 MG/1
25 TABLET ORAL EVERY MORNING
Qty: 30 TABLET | Refills: 5 | Status: SHIPPED | OUTPATIENT
Start: 2020-08-10 | End: 2020-12-14 | Stop reason: SDUPTHER

## 2020-08-10 RX ORDER — ALBUTEROL SULFATE 90 UG/1
2 AEROSOL, METERED RESPIRATORY (INHALATION) 4 TIMES DAILY PRN
Qty: 1 INHALER | Refills: 0 | Status: SHIPPED | OUTPATIENT
Start: 2020-08-10

## 2020-08-10 ASSESSMENT — ENCOUNTER SYMPTOMS
CONSTIPATION: 0
BLOOD IN STOOL: 0
EYE PAIN: 0
TROUBLE SWALLOWING: 0
DIARRHEA: 0
COUGH: 0
VOMITING: 0
ABDOMINAL PAIN: 0
SHORTNESS OF BREATH: 1
NAUSEA: 0

## 2020-08-10 NOTE — PATIENT INSTRUCTIONS
Thank you   1. Thank you for trusting us with your healthcare needs. You may receive a survey regarding today's visit. It would help us out if you would take a few moments to provide your feedback. We value your input. 2. Please bring in ALL medications BOTTLES, including inhalers, herbal supplements, over the counter, prescribed & non-prescribed medicine. The office would like actual medication bottles and a list.   3. Please note our OFFICE POLICIES:   a. Prior to getting your labs drawn, please check with your insurance company for benefits and eligibility of lab services. Often, insurance companies cover certain tests for preventative visits only. It is patient's responsibility to see what is covered. b. We are unable to change a diagnosis after the test has been performed. c. Lab orders will not be re-printed. Please hold onto your original lab orders and take them to your lab to be completed. d. If you no show your scheduled appointment three times, you will be dismissed from this practice. e. Benjaminetta Chen must be completed 24 hours prior to your schedule appointment. 4. If the list below has been completed, PLEASE FAX RECORDS TO OUR OFFICE @ [phone].  Once the records have been received we will update your records at our office:  Health Maintenance Due   Topic Date Due    DTaP/Tdap/Td vaccine (1 - Tdap) 06/14/1973    Pneumococcal 65+ years Vaccine (1 of 1 - PPSV23) 06/14/2019    Annual Wellness Visit (AWV)  06/18/2019    Breast cancer screen  06/25/2020    Potassium monitoring  08/19/2020    Creatinine monitoring  08/19/2020           Can try some albuterol     Can add some hctz to use as needed    Will do the magnesium at least 3 times a day    Will stay on the anne mag zinc    Will keep on the vitamin D     Will see you back in 6 months and recheck labs at that time    Mammogram before you come back

## 2020-08-10 NOTE — PROGRESS NOTES
Gardening, Aleve or Advil as needed for pain;    Swelling in hands and feet; - talk about medication change

## 2020-08-10 NOTE — PROGRESS NOTES
Health Maintenance Due   Topic Date Due    DTaP/Tdap/Td vaccine (1 - Tdap) 06/14/1973refused    Pneumococcal 65+ years Vaccine (1 of 1 - PPSV23) 06/14/2019refused    Annual Wellness Visit (AWV)  06/18/2019needs scheduled    Breast cancer screen  06/25/2020ordered    Potassium monitoring  08/19/2020ordered BMP    Creatinine monitoring  08/19/2020Ordered BMP         [facility]  [facility]  [address] [address]  Dept: [phone]  Dept Fax: [phone]: [address] [facility] Maintenance Due   Topic Date Due    DTaP/Tdap/Td vaccine (1 - Tdap) 06/14/1973    Pneumococcal 65+ years Vaccine (1 of 1 - PPSV23) 06/14/2019    Annual Wellness Visit (AWV)  06/18/2019    Breast cancer screen  06/25/2020    Potassium monitoring  08/19/2020    Creatinine monitoring  08/19/2020           Patient:  Rosie Lebron  Visit Date: 8/10/2020      Rosie Lebron is a 76 y.o. female who presents today for:  Chief Complaint   Patient presents with    Medication Refill    Hypertension       Goals      Blood Pressure < 140/90      Exercise 3x per week (30 min per time)           HPI:     HPI   At home can be 140s and she may not wait 15 minutes before taking the bp at home    Takes the cozaar, mvi and magnesium in the am - drinks water all day long    The chest pressure started when all the mask wearing started - some short of breath. If she uses a mask all day the breathing is worse and the heaviness is heavier. She uses albuterol. Some swelling in the hands and legs    There is a fam history of chf - wonders about the diuretic and if it is needed. Her mother had congestive heart failure and she knows there was reynaldo effect of the chemo on her heart also    Used albuterol years ago after the chemo and radiation and it did seem to help at that time    No question data found.     Past Medical History:   Diagnosis Date    Cancer [facility]) 1999 breast      Past Surgical History:   Procedure Laterality Date    APPENDECTOMY      BREAST LUMPECTOMY       SECTION      x 2    TUBAL LIGATION      WISDOM TOOTH EXTRACTION       Family History   Problem Relation Age of Onset    Heart Disease Mother     Heart Disease Father     Cancer Sister     High Blood Pressure Sister     Obesity Brother     Diabetes Brother     High Blood Pressure Brother     Cancer Sister         melanoma    High Blood Pressure Sister     Substance Abuse Sister         smoker    High Blood Pressure Sister     High Blood Pressure Brother     Heart Attack Brother      Social History     Tobacco Use    Smoking status: Former Smoker     Packs/day: 0.02     Years: 0.10     Pack years: 0.00     Types: Cigarettes     Last attempt to quit: 1972     Years since quittin.1    Smokeless tobacco: Former User   Substance Use Topics    Alcohol use: Yes     Frequency: Monthly or less     Drinks per session: 1 or 2     Comment: occassionally      Current Outpatient Medications   Medication Sig Dispense Refill    losartan (COZAAR) 50 MG tablet Take 1 tablet by mouth daily 90 tablet 1    DHEA 50 MG TABS Take 50 mg by mouth once      albuterol sulfate HFA (VENTOLIN HFA) 108 (90 Base) MCG/ACT inhaler Inhale 2 puffs into the lungs 4 times daily as needed for Wheezing 1 Inhaler 0    hydroCHLOROthiazide (HYDRODIURIL) 25 MG tablet Take 1 tablet by mouth every morning 30 tablet 5    MAGNESIUM PO Take 400 mg by mouth daily      Lactobacillus (PROBIOTIC ACIDOPHILUS PO) Take by mouth      CINNAMON PO Take by mouth 2000mg bid      vitamin E 400 UNIT capsule Take 400 Units by mouth 3 times daily      b complex vitamins capsule Take 1 capsule by mouth 3 times daily      Cholecalciferol (VITAMIN D3) 2000 units CAPS Take 2 capsules by mouth 2 times daily       Multiple Minerals-Vitamins (LAITH-MAG-ZINC-D PO) Take 1 tablet by mouth 3 times daily      Multiple Vitamins-Minerals (HAIR/SKIN/NAILS/BIOTIN PO) Take 1 tablet by mouth 3 times daily       No current facility-administered medications for this visit. Allergies   Allergen Reactions    Seasonal        Health Maintenance   Topic Date Due    DTaP/Tdap/Td vaccine (1 - Tdap) 06/14/1973    Pneumococcal 65+ years Vaccine (1 of 1 - PPSV23) 06/14/2019    Annual Wellness Visit (AWV)  06/18/2019    Breast cancer screen  06/25/2020    Potassium monitoring  08/19/2020    Creatinine monitoring  08/19/2020    Flu vaccine (1) 09/01/2020    Lipid screen  08/19/2024    Colon cancer screen colonoscopy  08/31/2028    DEXA (modify frequency per FRAX score)  Completed    Shingles Vaccine  Completed    Hepatitis C screen  Completed    Hepatitis A vaccine  Aged Out    Hepatitis B vaccine  Aged Out    Hib vaccine  Aged Out    Meningococcal (ACWY) vaccine  Aged Out       Subjective:      Review of Systems   Constitutional: Negative for chills, fatigue and fever. HENT: Negative for ear pain, postnasal drip and trouble swallowing. Eyes: Negative for pain and visual disturbance. Respiratory: Positive for shortness of breath. Negative for cough. Cardiovascular: Negative for chest pain and palpitations. Gastrointestinal: Negative for abdominal pain, blood in stool, constipation, diarrhea, nausea and vomiting. Genitourinary: Negative for difficulty urinating. Skin: Negative for rash. Neurological: Negative for headaches. Psychiatric/Behavioral: Negative for agitation. Objective:     Vitals:    08/10/20 1133   BP: 132/78   Pulse: 59   Temp: 96.7 °F (35.9 °C)   TempSrc: Temporal   SpO2: 96%   Weight: 189 lb (85.7 kg)   Height: 5' 3\" (1.6 m)       Body mass index is 33.48 kg/m².     Wt Readings from Last 3 Encounters:   08/10/20 189 lb (85.7 kg)   02/10/20 187 lb (84.8 kg)   07/29/19 185 lb 12.8 oz (84.3 kg)     BP Readings from Last 3 Encounters:   08/10/20 132/78   02/10/20 122/82   07/29/19 130/82 (COZAAR) 50 MG tablet    Magnesium    Basic Metabolic Panel    CBC Auto Differential    Lipid Panel    DHEA    DHEA-Sulfate    TSH with Reflex    hydroCHLOROthiazide (HYDRODIURIL) 25 MG tablet    EKG 12 Lead   3. Primary malignant neoplasm of breast without evidence of distant metastasis, unspecified laterality ([address]  JORGE DIGITAL SCREEN W OR WO CAD BILATERAL   4. Osteopenia of multiple sites  Basic Metabolic Panel    losartan (COZAAR) 50 MG tablet    Magnesium    Basic Metabolic Panel    CBC Auto Differential    Lipid Panel    DHEA    DHEA-Sulfate    TSH with Reflex    Vitamin D 25 Hydroxy   5. Lumbar disc disease     6. Intestinal malabsorption, unspecified type  Basic Metabolic Panel    Magnesium    Basic Metabolic Panel    CBC Auto Differential    Vitamin D 25 Hydroxy   7. Encounter for screening mammogram for malignant neoplasm of breast   JORGE DIGITAL SCREEN W OR WO CAD BILATERAL   8. Chest pressure  albuterol sulfate HFA (VENTOLIN HFA) 108 (90 Base) MCG/ACT inhaler    EKG 12 Lead       Plan:         Can try some albuterol     Can add some hctz to use as needed    Will do the magnesium at least 3 times a day    Will stay on the anne mag zinc a few times a day    Will keep on the vitamin D     Will see you back in 6 months and recheck labs at that time    Mammogram before you come back       Return in about 6 months (around 2/10/2021).     Orders Placed:  Orders Placed This Encounter   Procedures    JORGE DIGITAL SCREEN W OR WO CAD BILATERAL    Basic Metabolic Panel    Magnesium    Basic Metabolic Panel    CBC Auto Differential    Lipid Panel    DHEA    DHEA-Sulfate    TSH with Reflex    Vitamin D 25 Hydroxy    EKG 12 Lead     Medications Prescribed:  Orders Placed This Encounter   Medications    losartan (COZAAR) 50 MG tablet     Sig: Take 1 tablet by mouth daily     Dispense:  90 tablet     Refill:  1    albuterol sulfate HFA (VENTOLIN HFA) 108 (90 Base) MCG/ACT inhaler     Sig: Inhale 2 puffs into the lungs 4 times daily as needed for Wheezing     Dispense:  1 Inhaler     Refill:  0    hydroCHLOROthiazide (HYDRODIURIL) 25 MG tablet     Sig: Take 1 tablet by mouth every morning     Dispense:  30 tablet     Refill:  5       Future Appointments   Date Time Provider Chapin Isaac   12/14/2020 11:00 AM Addis Saxena DO [facility]  [facility] [address] [facility]       Patient given educational materials - see patient instructions. Discussed use, benefit, and sideeffects of prescribed medications. All patient questions answered. Pt voiced understanding. Reviewed health maintenance. Instructed to continue current medications, diet and exercise. Patient agreed with treatment plan. Follow up as directed. I was present for the key portions of the exam and history and confirmed all areas of the note with the patient, staff and the student.       Electronically signed by Ayse Arndt DO on 8/10/2020 at 1:44 PM

## 2020-09-14 ENCOUNTER — TELEPHONE (OUTPATIENT)
Dept: FAMILY MEDICINE CLINIC | Age: 65
End: 2020-09-14

## 2020-09-14 NOTE — TELEPHONE ENCOUNTER
Ordered the mammogram! With the z code - but it made me accept an EBN - do we need to send this out?

## 2020-09-15 ENCOUNTER — HOSPITAL ENCOUNTER (OUTPATIENT)
Dept: WOMENS IMAGING | Age: 65
Discharge: HOME | End: 2020-09-15
Payer: MEDICARE

## 2020-09-15 PROCEDURE — 77063 BREAST TOMOSYNTHESIS BI: CPT

## 2020-10-12 ENCOUNTER — TELEPHONE (OUTPATIENT)
Dept: CASE MANAGEMENT | Age: 65
End: 2020-10-12

## 2020-12-08 ENCOUNTER — NURSE ONLY (OUTPATIENT)
Dept: LAB | Age: 65
End: 2020-12-08

## 2020-12-08 LAB
ANION GAP SERPL CALCULATED.3IONS-SCNC: 14 MEQ/L (ref 8–16)
BASOPHILS # BLD: 1.3 %
BASOPHILS ABSOLUTE: 0.1 THOU/MM3 (ref 0–0.1)
BUN BLDV-MCNC: 12 MG/DL (ref 7–22)
CALCIUM SERPL-MCNC: 9.2 MG/DL (ref 8.5–10.5)
CHLORIDE BLD-SCNC: 102 MEQ/L (ref 98–111)
CHOLESTEROL, TOTAL: 177 MG/DL (ref 100–199)
CO2: 26 MEQ/L (ref 23–33)
CREAT SERPL-MCNC: 0.6 MG/DL (ref 0.4–1.2)
EOSINOPHIL # BLD: 2 %
EOSINOPHILS ABSOLUTE: 0.1 THOU/MM3 (ref 0–0.4)
ERYTHROCYTE [DISTWIDTH] IN BLOOD BY AUTOMATED COUNT: 13.8 % (ref 11.5–14.5)
ERYTHROCYTE [DISTWIDTH] IN BLOOD BY AUTOMATED COUNT: 47.1 FL (ref 35–45)
GFR SERPL CREATININE-BSD FRML MDRD: > 90 ML/MIN/1.73M2
GLUCOSE BLD-MCNC: 116 MG/DL (ref 70–108)
HCT VFR BLD CALC: 43.5 % (ref 37–47)
HDLC SERPL-MCNC: 63 MG/DL
HEMOGLOBIN: 14.1 GM/DL (ref 12–16)
IMMATURE GRANS (ABS): 0.02 THOU/MM3 (ref 0–0.07)
IMMATURE GRANULOCYTES: 0.3 %
LDL CHOLESTEROL CALCULATED: 96 MG/DL
LYMPHOCYTES # BLD: 28.1 %
LYMPHOCYTES ABSOLUTE: 2 THOU/MM3 (ref 1–4.8)
MAGNESIUM: 2.2 MG/DL (ref 1.6–2.4)
MCH RBC QN AUTO: 29.9 PG (ref 26–33)
MCHC RBC AUTO-ENTMCNC: 32.4 GM/DL (ref 32.2–35.5)
MCV RBC AUTO: 92.2 FL (ref 81–99)
MONOCYTES # BLD: 7 %
MONOCYTES ABSOLUTE: 0.5 THOU/MM3 (ref 0.4–1.3)
NUCLEATED RED BLOOD CELLS: 0 /100 WBC
PLATELET # BLD: 303 THOU/MM3 (ref 130–400)
PMV BLD AUTO: 10 FL (ref 9.4–12.4)
POTASSIUM SERPL-SCNC: 3.7 MEQ/L (ref 3.5–5.2)
RBC # BLD: 4.72 MILL/MM3 (ref 4.2–5.4)
SEG NEUTROPHILS: 61.3 %
SEGMENTED NEUTROPHILS ABSOLUTE COUNT: 4.3 THOU/MM3 (ref 1.8–7.7)
SODIUM BLD-SCNC: 142 MEQ/L (ref 135–145)
TRIGL SERPL-MCNC: 88 MG/DL (ref 0–199)
TSH SERPL DL<=0.05 MIU/L-ACNC: 2.53 UIU/ML (ref 0.4–4.2)
VITAMIN D 25-HYDROXY: 23 NG/ML (ref 30–100)
WBC # BLD: 7 THOU/MM3 (ref 4.8–10.8)

## 2020-12-10 LAB — DHEAS (DHEA SULFATE): 76.1 UG/DL (ref 13–130)

## 2020-12-14 ENCOUNTER — OFFICE VISIT (OUTPATIENT)
Dept: FAMILY MEDICINE CLINIC | Age: 65
End: 2020-12-14
Payer: MEDICARE

## 2020-12-14 VITALS
WEIGHT: 187.6 LBS | SYSTOLIC BLOOD PRESSURE: 136 MMHG | HEART RATE: 59 BPM | DIASTOLIC BLOOD PRESSURE: 86 MMHG | HEIGHT: 63 IN | OXYGEN SATURATION: 97 % | RESPIRATION RATE: 16 BRPM | TEMPERATURE: 97 F | BODY MASS INDEX: 33.24 KG/M2

## 2020-12-14 LAB — DHEA UNCONJUGATED: 2.41 NG/ML (ref 0.63–4.7)

## 2020-12-14 PROCEDURE — 99214 OFFICE O/P EST MOD 30 MIN: CPT | Performed by: FAMILY MEDICINE

## 2020-12-14 RX ORDER — LOSARTAN POTASSIUM 50 MG/1
50 TABLET ORAL DAILY
Qty: 90 TABLET | Refills: 1 | Status: SHIPPED | OUTPATIENT
Start: 2020-12-14 | End: 2021-03-22 | Stop reason: SDUPTHER

## 2020-12-14 RX ORDER — HYDROCHLOROTHIAZIDE 25 MG/1
25 TABLET ORAL EVERY MORNING
Qty: 30 TABLET | Refills: 5 | Status: SHIPPED | OUTPATIENT
Start: 2020-12-14 | End: 2021-03-22 | Stop reason: SDUPTHER

## 2020-12-14 ASSESSMENT — ENCOUNTER SYMPTOMS
VOMITING: 0
WHEEZING: 0
DIARRHEA: 0
TROUBLE SWALLOWING: 0
COUGH: 0
EYE PAIN: 0
ABDOMINAL PAIN: 0
NAUSEA: 0
CONSTIPATION: 0
SHORTNESS OF BREATH: 0
BLOOD IN STOOL: 0

## 2020-12-14 NOTE — PROGRESS NOTES
[address] [facility] [address] [id number]  Dept: [phone]  Loc: [phone]     Andris Dakin is a 76 y.o. female who presents today for:  Chief Complaint   Patient presents with    6 Month Follow-Up     medication refill and review labs completed 2020       Goals      Blood Pressure < 140/90      Exercise 3x per week (30 min per time)           HPI:     HPI   Did get her labs    Wonders about combining the bp meds    No question data found.     Past Medical History:   Diagnosis Date    Cancer [facility])     breast      Past Surgical History:   Procedure Laterality Date    APPENDECTOMY      BREAST LUMPECTOMY       SECTION      x 2    TUBAL LIGATION      WISDOM TOOTH EXTRACTION       Family History   Problem Relation Age of Onset    Heart Disease Mother     Heart Disease Father     Cancer Sister     High Blood Pressure Sister     Obesity Brother     Diabetes Brother     High Blood Pressure Brother     Cancer Sister         melanoma    High Blood Pressure Sister     Substance Abuse Sister         smoker    High Blood Pressure Sister     High Blood Pressure Brother     Heart Attack Brother      Social History     Tobacco Use    Smoking status: Never Smoker    Smokeless tobacco: Never Used   Substance Use Topics    Alcohol use: Yes     Frequency: Monthly or less     Drinks per session: 1 or 2     Comment: occassionally      Current Outpatient Medications   Medication Sig Dispense Refill    losartan (COZAAR) 50 MG tablet Take 1 tablet by mouth daily 90 tablet 1    albuterol sulfate HFA (VENTOLIN HFA) 108 (90 Base) MCG/ACT inhaler Inhale 2 puffs into the lungs 4 times daily as needed for Wheezing 1 Inhaler 0    hydroCHLOROthiazide (HYDRODIURIL) 25 MG tablet Take 1 tablet by mouth every morning 30 tablet 5    MAGNESIUM PO Take 400 mg by mouth daily  Lactobacillus (PROBIOTIC ACIDOPHILUS PO) Take by mouth daily       CINNAMON PO Take by mouth daily 2000mg      vitamin E 400 UNIT capsule Take 400 Units by mouth daily       b complex vitamins capsule Take 1 capsule by mouth daily       Cholecalciferol (VITAMIN D3) 2000 units CAPS Take 1 capsule by mouth daily       Multiple Minerals-Vitamins (LAITH-MAG-ZINC-D PO) Take 1 tablet by mouth daily       Multiple Vitamins-Minerals (HAIR/SKIN/NAILS/BIOTIN PO) Take 1 tablet by mouth daily       DHEA 50 MG TABS Take 50 mg by mouth once       No current facility-administered medications for this visit. Allergies   Allergen Reactions    Seasonal        Health Maintenance   Topic Date Due    DTaP/Tdap/Td vaccine (1 - Tdap) 06/14/1973    Annual Wellness Visit (AWV)  06/18/2019    Diabetes screen  06/04/2021    Pneumococcal 65+ years Vaccine (2 of 2 - PPSV23) 11/30/2021    Potassium monitoring  12/08/2021    Creatinine monitoring  12/08/2021    Breast cancer screen  09/15/2022    Lipid screen  12/08/2025    Colon cancer screen colonoscopy  08/31/2028    DEXA (modify frequency per FRAX score)  Completed    Flu vaccine  Completed    Shingles Vaccine  Completed    Hepatitis C screen  Completed    Hepatitis A vaccine  Aged Out    Hepatitis B vaccine  Aged Out    Hib vaccine  Aged Out    Meningococcal (ACWY) vaccine  Aged Out       Subjective:      Review of Systems   Constitutional: Negative for chills, fatigue and fever. HENT: Negative for ear pain, postnasal drip and trouble swallowing. Eyes: Negative for pain and visual disturbance. Respiratory: Negative for cough and shortness of breath. Cardiovascular: Negative for chest pain and palpitations. Gastrointestinal: Negative for abdominal pain, blood in stool, constipation, diarrhea, nausea and vomiting. Genitourinary: Negative for difficulty urinating. Skin: Negative for rash. Neurological: Negative for headaches. K 3.7 12/08/2020     12/08/2020    CREATININE 0.6 12/08/2020    BUN 12 12/08/2020    CO2 26 12/08/2020    TSH 2.530 12/08/2020    LABA1C 5.5 06/04/2018    LABGLOM >90 12/08/2020    MG 2.2 12/08/2020    CALCIUM 9.2 12/08/2020    VITD25 23 (L) 12/08/2020       Imaging Results:    No results found. Assessment:      Diagnosis Orders   1. Essential hypertension  losartan (COZAAR) 50 MG tablet    hydroCHLOROthiazide (HYDRODIURIL) 25 MG tablet   2. Enlarged thyroid  losartan (COZAAR) 50 MG tablet   3. Osteopenia of multiple sites  losartan (COZAAR) 50 MG tablet       Plan:         Ok to combine the losartan and the hctz but would have to change the dose - for now will stay the same    Increase the vit D and magnesium    Will go back on the dhea    Will see you back in 6 months can do an annual wellness in the next month or so        The 10-year ASCVD risk score (Genevieve Pérez, et al., 2013) is: 8.4%    Values used to calculate the score:      Age: 76 years      Sex: Female      Is Non- : No      Diabetic: No      Tobacco smoker: No      Systolic Blood Pressure: 428 mmHg      Is BP treated: Yes      HDL Cholesterol: 63 mg/dL      Total Cholesterol: 177 mg/dL        No follow-ups on file. Orders Placed:  No orders of the defined types were placed in this encounter. Medications Prescribed:  No orders of the defined types were placed in this encounter. No future appointments. Patient given educational materials - see patient instructions. Discussed use, benefit, and sideeffects of prescribed medications. All patient questions answered. Pt voiced understanding. Reviewed health maintenance. Instructed to continue current medications, diet and exercise. Patient agreed with treatment plan. Follow up as directed. I was present for the key portions of the exam and history and confirmed all areas of the note with the patient, staff and the student.

## 2020-12-14 NOTE — PROGRESS NOTES
Liu Garibay is a 76 y.o. female who presents today for:  Chief Complaint   Patient presents with    6 Month Follow-Up     medication refill and review labs completed 2020       Goals      Blood Pressure < 140/90      Exercise 3x per week (30 min per time)           HPI:     HPI     Here for follow up and review blood work. Feeling well. No complaints. Obesity  BMI of 33.23. States that she has been eating out a lot. Admits to eating processed foods. Vitamin D def  Vitamin D level 20 was low at 23. Was on Vit D3 supplementation TID but decreased to once daily. HTN  Controlled on current regimen. Would like to combine her current meds. No question data found.     Past Medical History:   Diagnosis Date    Cancer [facility])     breast      Past Surgical History:   Procedure Laterality Date    APPENDECTOMY      BREAST LUMPECTOMY       SECTION      x 2    TUBAL LIGATION      WISDOM TOOTH EXTRACTION       Family History   Problem Relation Age of Onset    Heart Disease Mother     Heart Disease Father     Cancer Sister     High Blood Pressure Sister     Obesity Brother     Diabetes Brother     High Blood Pressure Brother     Cancer Sister         melanoma    High Blood Pressure Sister     Substance Abuse Sister         smoker    High Blood Pressure Sister     High Blood Pressure Brother     Heart Attack Brother      Social History     Tobacco Use    Smoking status: Never Smoker    Smokeless tobacco: Never Used   Substance Use Topics    Alcohol use: Yes     Frequency: Monthly or less     Drinks per session: 1 or 2     Comment: occassionally      Current Outpatient Medications   Medication Sig Dispense Refill    losartan (COZAAR) 50 MG tablet Take 1 tablet by mouth daily 90 tablet 1    hydroCHLOROthiazide (HYDRODIURIL) 25 MG tablet Take 1 tablet by mouth every morning 30 tablet 5  albuterol sulfate HFA (VENTOLIN HFA) 108 (90 Base) MCG/ACT inhaler Inhale 2 puffs into the lungs 4 times daily as needed for Wheezing 1 Inhaler 0    MAGNESIUM PO Take 400 mg by mouth daily      Lactobacillus (PROBIOTIC ACIDOPHILUS PO) Take by mouth daily       CINNAMON PO Take by mouth daily 2000mg      vitamin E 400 UNIT capsule Take 400 Units by mouth daily       b complex vitamins capsule Take 1 capsule by mouth daily       Cholecalciferol (VITAMIN D3) 2000 units CAPS Take 1 capsule by mouth daily       Multiple Minerals-Vitamins (LAITH-MAG-ZINC-D PO) Take 1 tablet by mouth daily       Multiple Vitamins-Minerals (HAIR/SKIN/NAILS/BIOTIN PO) Take 1 tablet by mouth daily       DHEA 50 MG TABS Take 50 mg by mouth once       No current facility-administered medications for this visit. Allergies   Allergen Reactions    Seasonal        Health Maintenance   Topic Date Due    DTaP/Tdap/Td vaccine (1 - Tdap) 06/14/1973    Annual Wellness Visit (AWV)  06/18/2019    Diabetes screen  06/04/2021    Pneumococcal 65+ years Vaccine (2 of 2 - PPSV23) 11/30/2021    Potassium monitoring  12/08/2021    Creatinine monitoring  12/08/2021    Breast cancer screen  09/15/2022    Lipid screen  12/08/2025    Colon cancer screen colonoscopy  08/31/2028    DEXA (modify frequency per FRAX score)  Completed    Flu vaccine  Completed    Shingles Vaccine  Completed    Hepatitis C screen  Completed    Hepatitis A vaccine  Aged Out    Hepatitis B vaccine  Aged Out    Hib vaccine  Aged Out    Meningococcal (ACWY) vaccine  Aged Out       Subjective:      Review of Systems   Constitutional: Negative for fatigue and fever. HENT: Negative for congestion. Eyes: Negative for visual disturbance. Respiratory: Negative for cough, shortness of breath and wheezing. Cardiovascular: Negative for chest pain and palpitations. Gastrointestinal: Negative for abdominal pain, constipation, diarrhea, nausea and vomiting. Genitourinary: Negative for frequency, hematuria and urgency. Skin: Negative for rash and wound. Neurological: Negative for weakness and numbness. Psychiatric/Behavioral: Negative for agitation and confusion. Objective:     Vitals:    12/14/20 1047   BP: 136/86   Site: Right Upper Arm   Position: Sitting   Cuff Size: Medium Adult   Pulse: 59   Resp: 16   Temp: 97 °F (36.1 °C)   TempSrc: Temporal   SpO2: 97%   Weight: 187 lb 9.6 oz (85.1 kg)   Height: 5' 3\" (1.6 m)       Body mass index is 33.23 kg/m². Wt Readings from Last 3 Encounters:   12/14/20 187 lb 9.6 oz (85.1 kg)   08/10/20 189 lb (85.7 kg)   02/10/20 187 lb (84.8 kg)     BP Readings from Last 3 Encounters:   12/14/20 136/86   08/10/20 132/78   02/10/20 122/82       Physical Exam  Vitals signs and nursing note reviewed. Constitutional:       General: She is not in acute distress. Appearance: Normal appearance. She is not ill-appearing. HENT:      Head: Normocephalic and atraumatic. Right Ear: External ear normal.      Left Ear: External ear normal.   Eyes:      Extraocular Movements: Extraocular movements intact. Conjunctiva/sclera: Conjunctivae normal.      Pupils: Pupils are equal, round, and reactive to light. Neck:      Musculoskeletal: Normal range of motion and neck supple. No muscular tenderness. Cardiovascular:      Rate and Rhythm: Normal rate and regular rhythm. Pulses: Normal pulses. Heart sounds: No murmur. Pulmonary:      Effort: Pulmonary effort is normal. No respiratory distress. Breath sounds: Normal breath sounds. No wheezing. Abdominal:      General: Abdomen is flat. There is no distension. Palpations: Abdomen is soft. Tenderness: There is no abdominal tenderness. Musculoskeletal: Normal range of motion. Right lower leg: No edema. Left lower leg: No edema. Skin:     General: Skin is warm and dry. Capillary Refill: Capillary refill takes less than 2 seconds. Findings: No rash. Neurological:      General: No focal deficit present. Mental Status: She is alert and oriented to person, place, and time. Sensory: No sensory deficit. Gait: Gait normal.   Psychiatric:         Mood and Affect: Mood normal.         Behavior: Behavior normal.           Lab Results   Component Value Date    WBC 7.0 12/08/2020    HGB 14.1 12/08/2020    HCT 43.5 12/08/2020     12/08/2020    CHOL 177 12/08/2020    TRIG 88 12/08/2020    HDL 63 12/08/2020    LDLCALC 96 12/08/2020    AST 16 08/19/2019     12/08/2020    K 3.7 12/08/2020     12/08/2020    CREATININE 0.6 12/08/2020    BUN 12 12/08/2020    CO2 26 12/08/2020    TSH 2.530 12/08/2020    LABA1C 5.5 06/04/2018    LABGLOM >90 12/08/2020    MG 2.2 12/08/2020    CALCIUM 9.2 12/08/2020    VITD25 23 (L) 12/08/2020       Imaging Results:    No results found. Assessment / Plan:     1. Essential hypertension  Continue.   - losartan (COZAAR) 50 MG tablet; Take 1 tablet by mouth daily  Dispense: 90 tablet; Refill: 1  - hydroCHLOROthiazide (HYDRODIURIL) 25 MG tablet; Take 1 tablet by mouth every morning  Dispense: 30 tablet; Refill: 5    2. BMI 33.0-33.9,adult  - Counseled on  healthy lifestyle, weight loss, diet, and exercise    3. Vitamin D deficiency  - Advised patient to take her vit D3 supplement TID as prescribed. Advised patient to take the vit D with fatty foods. 4. Enlarged thyroid  - Stable    5. Osteopenia of multiple sites  - stable        Return in about 3 months (around 3/14/2021) for Follow chronic conditions.       Medications Prescribed:  Orders Placed This Encounter   Medications    losartan (COZAAR) 50 MG tablet     Sig: Take 1 tablet by mouth daily     Dispense:  90 tablet     Refill:  1    hydroCHLOROthiazide (HYDRODIURIL) 25 MG tablet     Sig: Take 1 tablet by mouth every morning     Dispense:  30 tablet     Refill:  5 No future appointments. Patient given educational materials - see patient instructions. Discussed use, benefit, and side effects of prescribed medications. All patient questions answered. Patient voiced understanding. Reviewed health maintenance. Instructed to continue current medications, diet and exercise. Patient agreed with treatment plan. Follow up as directed.      Electronically signed by Sallie Hensley MD on 12/14/2020 at 12:32 PM

## 2020-12-14 NOTE — PATIENT INSTRUCTIONS
Thank you   1. Thank you for trusting us with your healthcare needs. You may receive a survey regarding today's visit. It would help us out if you would take a few moments to provide your feedback. We value your input. 2. Please bring in ALL medications BOTTLES, including inhalers, herbal supplements, over the counter, prescribed & non-prescribed medicine. The office would like actual medication bottles and a list.   3. Please note our OFFICE POLICIES:   a. Prior to getting your labs drawn, please check with your insurance company for benefits and eligibility of lab services. Often, insurance companies cover certain tests for preventative visits only. It is patient's responsibility to see what is covered. b. We are unable to change a diagnosis after the test has been performed. c. Lab orders will not be re-printed. Please hold onto your original lab orders and take them to your lab to be completed. d. If you no show your scheduled appointment three times, you will be dismissed from this practice. e. Pegge Sleight must be completed 24 hours prior to your schedule appointment. 4. If the list below has been completed, PLEASE FAX RECORDS TO OUR OFFICE @ [phone].  Once the records have been received we will update your records at our office:  Health Maintenance Due   Topic Date Due    DTaP/Tdap/Td vaccine (1 - Tdap) 06/14/1973    Annual Wellness Visit (AWV)  06/18/2019

## 2020-12-23 ENCOUNTER — TELEPHONE (OUTPATIENT)
Dept: FAMILY MEDICINE CLINIC | Age: 65
End: 2020-12-23

## 2020-12-23 PROBLEM — J43.8 OTHER EMPHYSEMA (HCC): Status: ACTIVE | Noted: 2020-12-23

## 2020-12-23 RX ORDER — FLUTICASONE PROPIONATE 110 UG/1
2 AEROSOL, METERED RESPIRATORY (INHALATION) 2 TIMES DAILY
Qty: 1 INHALER | Refills: 3 | Status: SHIPPED | OUTPATIENT
Start: 2020-12-23 | End: 2022-04-29

## 2020-12-23 RX ORDER — LEVOFLOXACIN 500 MG/1
500 TABLET, FILM COATED ORAL DAILY
Qty: 7 TABLET | Refills: 1 | Status: SHIPPED | OUTPATIENT
Start: 2020-12-23 | End: 2020-12-30

## 2021-01-04 ENCOUNTER — TELEPHONE (OUTPATIENT)
Dept: FAMILY MEDICINE CLINIC | Age: 66
End: 2021-01-04

## 2021-01-04 DIAGNOSIS — R05.9 COUGH: Primary | ICD-10-CM

## 2021-01-04 RX ORDER — PREDNISONE 20 MG/1
40 TABLET ORAL DAILY
Qty: 10 TABLET | Refills: 1 | Status: SHIPPED | OUTPATIENT
Start: 2021-01-04 | End: 2021-01-09

## 2021-01-04 NOTE — TELEPHONE ENCOUNTER
Pt was seen in December with  kwasi. He is now calling asking for a steroid for the cough. - see t/c with Dr. Rajendra Boles 12/23/2020. Not sure if appropriate. has already had fluticasone and levofloxacin sent. Please review, and advise.

## 2021-01-04 NOTE — TELEPHONE ENCOUNTER
Patient has also got a rash and has been itching the last few days - also using the inhalers - they aren't necessarily working well - cough is getting better but not sure the inhalers are working    Will call in the steroids and order a chest xray to get if the cough does not get better

## 2021-03-22 ENCOUNTER — OFFICE VISIT (OUTPATIENT)
Dept: FAMILY MEDICINE CLINIC | Age: 66
End: 2021-03-22
Payer: MEDICARE

## 2021-03-22 VITALS
HEIGHT: 63 IN | DIASTOLIC BLOOD PRESSURE: 80 MMHG | SYSTOLIC BLOOD PRESSURE: 118 MMHG | TEMPERATURE: 97.4 F | BODY MASS INDEX: 32.74 KG/M2 | WEIGHT: 184.8 LBS | HEART RATE: 61 BPM | RESPIRATION RATE: 16 BRPM | OXYGEN SATURATION: 97 %

## 2021-03-22 DIAGNOSIS — M85.89 OSTEOPENIA OF MULTIPLE SITES: ICD-10-CM

## 2021-03-22 DIAGNOSIS — R73.9 HYPERGLYCEMIA: ICD-10-CM

## 2021-03-22 DIAGNOSIS — I10 ESSENTIAL HYPERTENSION: ICD-10-CM

## 2021-03-22 DIAGNOSIS — K90.9 INTESTINAL MALABSORPTION, UNSPECIFIED TYPE: ICD-10-CM

## 2021-03-22 DIAGNOSIS — M51.9 LUMBAR DISC DISEASE: ICD-10-CM

## 2021-03-22 DIAGNOSIS — Z00.00 ROUTINE GENERAL MEDICAL EXAMINATION AT A HEALTH CARE FACILITY: Primary | ICD-10-CM

## 2021-03-22 DIAGNOSIS — E16.2 LOW BLOOD SUGAR: ICD-10-CM

## 2021-03-22 DIAGNOSIS — E04.9 ENLARGED THYROID: ICD-10-CM

## 2021-03-22 DIAGNOSIS — R07.89 CHEST PRESSURE: ICD-10-CM

## 2021-03-22 LAB — HBA1C MFR BLD: 5.3 % (ref 4.3–5.7)

## 2021-03-22 PROCEDURE — G0438 PPPS, INITIAL VISIT: HCPCS | Performed by: FAMILY MEDICINE

## 2021-03-22 PROCEDURE — 83036 HEMOGLOBIN GLYCOSYLATED A1C: CPT | Performed by: FAMILY MEDICINE

## 2021-03-22 PROCEDURE — 93000 ELECTROCARDIOGRAM COMPLETE: CPT | Performed by: FAMILY MEDICINE

## 2021-03-22 RX ORDER — LOSARTAN POTASSIUM 50 MG/1
50 TABLET ORAL DAILY
Qty: 90 TABLET | Refills: 3 | Status: SHIPPED | OUTPATIENT
Start: 2021-03-22 | End: 2022-03-31 | Stop reason: SDUPTHER

## 2021-03-22 RX ORDER — HYDROCHLOROTHIAZIDE 25 MG/1
25 TABLET ORAL EVERY MORNING
Qty: 90 TABLET | Refills: 3 | Status: SHIPPED | OUTPATIENT
Start: 2021-03-22 | End: 2022-03-31 | Stop reason: SDUPTHER

## 2021-03-22 ASSESSMENT — ENCOUNTER SYMPTOMS
SHORTNESS OF BREATH: 0
COUGH: 0
DIARRHEA: 0
NAUSEA: 0
TROUBLE SWALLOWING: 0
BLOOD IN STOOL: 0
ABDOMINAL PAIN: 0
CONSTIPATION: 0
EYE PAIN: 0
VOMITING: 0

## 2021-03-22 ASSESSMENT — PATIENT HEALTH QUESTIONNAIRE - PHQ9
SUM OF ALL RESPONSES TO PHQ QUESTIONS 1-9: 1
SUM OF ALL RESPONSES TO PHQ QUESTIONS 1-9: 1

## 2021-03-22 ASSESSMENT — LIFESTYLE VARIABLES: HOW OFTEN DO YOU HAVE A DRINK CONTAINING ALCOHOL: 1

## 2021-03-22 NOTE — PROGRESS NOTES
[address] [address]  Dept: [phone]  Loc: [phone]     Alexus Schneider is a 76 y.o. female who presents today for:  Chief Complaint   Patient presents with    Medicare AWV       Goals      Blood Pressure < 140/90      Exercise 3x per week (30 min per time)           HPI:     HPI   Has some weight on her shoulders on occasion - it was there in the past - happens a few times a week - it feels like the shoulders are heavy and a bit of pressure in the upper chest    Grandmothers did die of heart attacks older - her mom had a valve they needed to check    Feels less stressed now - getting more sleep now, still interested in things, plenty of energy and eating ok - except for the lower back and hip    Her lower back is more of a problem now than anything - her hip is hurting on and off - did have an mri in the past that showed disc problems    Not too much heart burn - if she lays on her right side she will have some gerd if she drinks before laying down    Does stretch and that helps to bring the leg up to stretch    If she does not take magnesium she will be constipated    If she does not eat she will get dizzy and shaky      Patient is also here for [facility] - see below  No question data found.     Past Medical History:   Diagnosis Date    Cancer [facility])     breast      Past Surgical History:   Procedure Laterality Date    APPENDECTOMY      BREAST LUMPECTOMY       SECTION      x 2    TUBAL LIGATION      WISDOM TOOTH EXTRACTION       Family History   Problem Relation Age of Onset    Heart Disease Mother     Heart Disease Father     Cancer Sister     High Blood Pressure Sister     Obesity Brother     Diabetes Brother     High Blood Pressure Brother     Cancer Sister         melanoma    High Blood Pressure Sister     Substance Abuse Sister         smoker    High Blood Pressure Sister     High Blood Pressure Brother     Heart Attack Brother      Social History     Tobacco Use    Smoking status: Never Smoker    Smokeless tobacco: Never Used   Substance Use Topics    Alcohol use: Yes     Frequency: Monthly or less     Drinks per session: 1 or 2     Comment: occassionally      Current Outpatient Medications   Medication Sig Dispense Refill    hydroCHLOROthiazide (HYDRODIURIL) 25 MG tablet Take 1 tablet by mouth every morning 90 tablet 3    losartan (COZAAR) 50 MG tablet Take 1 tablet by mouth daily 90 tablet 3    DHEA 50 MG TABS Take 50 mg by mouth once      MAGNESIUM PO Take 400 mg by mouth daily      CINNAMON PO Take by mouth daily 2000mg      vitamin E 400 UNIT capsule Take 400 Units by mouth daily       b complex vitamins capsule Take 1 capsule by mouth daily       Cholecalciferol (VITAMIN D3) 25 MCG (1000 UT) CAPS Take 1 capsule by mouth daily       fluticasone (FLOVENT HFA) 110 MCG/ACT inhaler Inhale 2 puffs into the lungs 2 times daily (Patient not taking: Reported on 3/22/2021) 1 Inhaler 3    albuterol sulfate HFA (VENTOLIN HFA) 108 (90 Base) MCG/ACT inhaler Inhale 2 puffs into the lungs 4 times daily as needed for Wheezing (Patient not taking: Reported on 3/22/2021) 1 Inhaler 0    Lactobacillus (PROBIOTIC ACIDOPHILUS PO) Take by mouth daily       Multiple Minerals-Vitamins (LAITH-MAG-ZINC-D PO) Take 1 tablet by mouth daily       Multiple Vitamins-Minerals (HAIR/SKIN/NAILS/BIOTIN PO) Take 1 tablet by mouth daily        No current facility-administered medications for this visit.       Allergies   Allergen Reactions    Seasonal        Health Maintenance   Topic Date Due    COVID-19 Vaccine (1) Never done    DTaP/Tdap/Td vaccine (1 - Tdap) Never done    Annual Wellness Visit (AWV)  Never done    Pneumococcal 65+ years Vaccine (2 of 2 - PPSV23) 11/30/2021    Potassium monitoring  12/08/2021    Creatinine monitoring  12/08/2021    Breast cancer screen 09/15/2022    Diabetes screen  03/22/2024    Lipid screen  12/08/2025    Colon cancer screen colonoscopy  08/31/2028    DEXA (modify frequency per FRAX score)  Completed    Flu vaccine  Completed    Shingles Vaccine  Completed    Hepatitis C screen  Completed    Hepatitis A vaccine  Aged Out    Hepatitis B vaccine  Aged Out    Hib vaccine  Aged Out    Meningococcal (ACWY) vaccine  Aged Out       Subjective:      Review of Systems   Constitutional: Negative for chills, fatigue and fever. HENT: Negative for ear pain, postnasal drip and trouble swallowing. Eyes: Negative for pain and visual disturbance. Respiratory: Negative for cough and shortness of breath. Cardiovascular: Negative for chest pain and palpitations. Gastrointestinal: Negative for abdominal pain, blood in stool, constipation, diarrhea, nausea and vomiting. Genitourinary: Negative for difficulty urinating. Skin: Negative for rash. Neurological: Positive for headaches. Psychiatric/Behavioral: Negative for agitation. Objective:     Vitals:    03/22/21 1046   BP: 118/80   Site: Right Upper Arm   Position: Sitting   Cuff Size: Medium Adult   Pulse: 61   Resp: 16   Temp: 97.4 °F (36.3 °C)   TempSrc: Temporal   SpO2: 97%   Weight: 184 lb 12.8 oz (83.8 kg)   Height: 5' 3\" (1.6 m)       Body mass index is 32.74 kg/m². Wt Readings from Last 3 Encounters:   03/22/21 184 lb 12.8 oz (83.8 kg)   12/14/20 187 lb 9.6 oz (85.1 kg)   08/10/20 189 lb (85.7 kg)     BP Readings from Last 3 Encounters:   03/22/21 118/80   12/14/20 136/86   08/10/20 132/78       Physical Exam  Vitals signs and nursing note reviewed. Constitutional:       General: She is not in acute distress. Appearance: She is well-developed. She is not diaphoretic. HENT:      Head: Normocephalic and atraumatic. Right Ear: External ear normal.      Left Ear: External ear normal.   Eyes:      General: No scleral icterus. Right eye: No discharge. Left eye: No discharge. Conjunctiva/sclera: Conjunctivae normal.   Neck:      Musculoskeletal: Normal range of motion. Cardiovascular:      Rate and Rhythm: Normal rate and regular rhythm. Heart sounds: Normal heart sounds. No murmur. Pulmonary:      Effort: Pulmonary effort is normal.      Breath sounds: Normal breath sounds. Skin:     General: Skin is warm and dry. Findings: No erythema or rash. Neurological:      Mental Status: She is alert and oriented to person, place, and time. Cranial Nerves: No cranial nerve deficit. Psychiatric:         Behavior: Behavior normal.         Thought Content: Thought content normal.         Judgment: Judgment normal.           Lab Results   Component Value Date    WBC 7.0 12/08/2020    HGB 14.1 12/08/2020    HCT 43.5 12/08/2020     12/08/2020    CHOL 177 12/08/2020    TRIG 88 12/08/2020    HDL 63 12/08/2020    LDLCALC 96 12/08/2020    AST 16 08/19/2019     12/08/2020    K 3.7 12/08/2020     12/08/2020    CREATININE 0.6 12/08/2020    BUN 12 12/08/2020    CO2 26 12/08/2020    TSH 2.530 12/08/2020    LABA1C 5.3 03/22/2021    LABGLOM >90 12/08/2020    MG 2.2 12/08/2020    CALCIUM 9.2 12/08/2020    VITD25 23 (L) 12/08/2020       Imaging Results:    No results found. Assessment:      Diagnosis Orders   1. Routine general medical examination at a health care facility     2. Essential hypertension  hydroCHLOROthiazide (HYDRODIURIL) 25 MG tablet    losartan (COZAAR) 50 MG tablet    Vitamin D 25 Hydroxy    Basic Metabolic Panel   3. Enlarged thyroid     4. Osteopenia of multiple sites  XR LUMBAR SPINE (2-3 VIEWS)   5. Lumbar disc disease  XR LUMBAR SPINE (2-3 VIEWS)   6. Chest pressure  EKG 12 Lead   7. Intestinal malabsorption, unspecified type  Vitamin D 25 Hydroxy   8. Low blood sugar  POCT glycosylated hemoglobin (Hb A1C)   9. Hyperglycemia  POCT glycosylated hemoglobin (Hb A1C)       Plan:          Will do the xray of the back and hip - will do stretches and canorder PT also    ekg today for the heaviness in the shoulders    Refill meds losartan and hctz    Will go up to 2 a day of the magnesium and 2000 a day of the vit d    Will take an extra fish oil when you don't eat fish    a1c today    The 10-year ASCVD risk score (Nasir Charlton., et al., 2013) is: 6.3%    Values used to calculate the score:      Age: 76 years      Sex: Female      Is Non- : No      Diabetic: No      Tobacco smoker: No      Systolic Blood Pressure: 007 mmHg      Is BP treated: Yes      HDL Cholesterol: 63 mg/dL      Total Cholesterol: 177 mg/dL        Return for Medicare Annual Wellness Visit in 1 year. Orders Placed:  Orders Placed This Encounter   Procedures    XR LUMBAR SPINE (2-3 VIEWS)    Vitamin D 25 Hydroxy    Basic Metabolic Panel    POCT glycosylated hemoglobin (Hb A1C)    EKG 12 Lead     Medications Prescribed:  Orders Placed This Encounter   Medications    hydroCHLOROthiazide (HYDRODIURIL) 25 MG tablet     Sig: Take 1 tablet by mouth every morning     Dispense:  90 tablet     Refill:  3    losartan (COZAAR) 50 MG tablet     Sig: Take 1 tablet by mouth daily     Dispense:  90 tablet     Refill:  3       No future appointments. Patient given educational materials - see patient instructions. Discussed use, benefit, and sideeffects of prescribed medications. All patient questions answered. Pt voiced understanding. Reviewed health maintenance. Instructed to continue current medications, diet and exercise. Patient agreed with treatment plan. Follow up as directed. I was present for the key portions of the exam and history and confirmed all areas of the note with the patient, staff and the student. Electronically signed by Dandy Sarkar DO on 3/22/2021 at 1:29 PM     Medicare Annual Wellness Visit  Name: Eitan Story Date: 3/22/2021   MRN: [id number] Sex: Female   Age: 76 y.o.  Ethnicity: Non-/Non    : 1955 Race: Hermann Fagan is here for Medicare AWV    Screenings for behavioral, psychosocial and functional/safety risks, and cognitive dysfunction are all negative except as indicated below. These results, as well as other patient data from the [address] form, are documented in Flowsheets linked to this Encounter. Allergies   Allergen Reactions    Seasonal          Prior to Visit Medications    Medication Sig Taking?  Authorizing Provider   hydroCHLOROthiazide (HYDRODIURIL) 25 MG tablet Take 1 tablet by mouth every morning Yes Trina Butcher DO   losartan (COZAAR) 50 MG tablet Take 1 tablet by mouth daily Yes Trina Butcher,    DHEA 50 MG TABS Take 50 mg by mouth once Yes Historical Provider, MD   MAGNESIUM PO Take 400 mg by mouth daily Yes Historical Provider, MD   CINNAMON PO Take by mouth daily 2000mg Yes Historical Provider, MD   vitamin E 400 UNIT capsule Take 400 Units by mouth daily  Yes Historical Provider, MD   b complex vitamins capsule Take 1 capsule by mouth daily  Yes Historical Provider, MD   Cholecalciferol (VITAMIN D3) 25 MCG (1000 UT) CAPS Take 1 capsule by mouth daily  Yes Historical Provider, MD   fluticasone (FLOVENT HFA) 110 MCG/ACT inhaler Inhale 2 puffs into the lungs 2 times daily  Patient not taking: Reported on 3/22/2021  Trina Butcher DO   albuterol sulfate HFA (VENTOLIN HFA) 108 (90 Base) MCG/ACT inhaler Inhale 2 puffs into the lungs 4 times daily as needed for Wheezing  Patient not taking: Reported on 3/22/2021  Trina Butcher DO   Lactobacillus (PROBIOTIC ACIDOPHILUS PO) Take by mouth daily   Historical Provider, MD   Multiple Minerals-Vitamins (LAITH-MAG-ZINC-D PO) Take 1 tablet by mouth daily   Historical Provider, MD   Multiple Vitamins-Minerals (HAIR/SKIN/NAILS/BIOTIN PO) Take 1 tablet by mouth daily   Historical Provider, MD         Past Medical History:   Diagnosis Date    Cancer [facility])     breast       Past Surgical History:   Procedure Laterality Date    APPENDECTOMY      BREAST LUMPECTOMY       SECTION      x 2    TUBAL LIGATION      WISDOM TOOTH EXTRACTION           Family History   Problem Relation Age of Onset    Heart Disease Mother     Heart Disease Father     Cancer Sister     High Blood Pressure Sister     Obesity Brother     Diabetes Brother     High Blood Pressure Brother     Cancer Sister         melanoma    High Blood Pressure Sister     Substance Abuse Sister         smoker    High Blood Pressure Sister     High Blood Pressure Brother     Heart Attack Brother        CareTeam (Including outside providers/suppliers regularly involved in providing care):   Patient Care Team:  Yola Obregon DO as PCP - General (Family Medicine)  Yola Obregon DO as PCP - [facility] Empaneled Provider    Wt Readings from Last 3 Encounters:   21 184 lb 12.8 oz (83.8 kg)   20 187 lb 9.6 oz (85.1 kg)   08/10/20 189 lb (85.7 kg)     Vitals:    21 1046   BP: 118/80   Site: Right Upper Arm   Position: Sitting   Cuff Size: Medium Adult   Pulse: 61   Resp: 16   Temp: 97.4 °F (36.3 °C)   TempSrc: Temporal   SpO2: 97%   Weight: 184 lb 12.8 oz (83.8 kg)   Height: 5' 3\" (1.6 m)     Body mass index is 32.74 kg/m². Based upon direct observation of the patient, evaluation of cognition reveals recent and remote memory intact. Patient's complete Health Risk Assessment and screening values have been reviewed and are found in Flowsheets. The following problems were reviewed today and where indicated follow up appointments were made and/or referrals ordered. Positive Risk Factor Screenings with Interventions:            General Health and ACP:  General  In general, how would you say your health is?: Good  In the past 7 days, have you experienced any of the following?  New or Increased Pain, New or Increased Fatigue, Loneliness, Social Isolation, Stress or Anger?: (!) New or Increased Pain, Stress  Do you get the social and emotional support that you need?: Yes  Do you have a Living Will?: Yes  Advance Directives     Power of [address] Will ACP-Advance Directive ACP-Power of     Not on File Not on File Not on File Not on File      General Health Risk Interventions:  · see pharm d note    Health Habits/Nutrition:  Health Habits/Nutrition  Do you exercise for at least 20 minutes 2-3 times per week?: Yes  Have you lost any weight without trying in the past 3 months?: (!) Yes(During COVID-19)  Do you eat only one meal per day?: No  Have you seen the dentist within the past year?: Yes  Body mass index: (!) 32.73  Health Habits/Nutrition Interventions:  · see pharm d note and nwankit start gardening    Hearing/Vision:  No exam data present  Hearing/Vision  Do you or your family notice any trouble with your hearing that hasn't been managed with hearing aids?: (!) Yes(tinnitus)  Do you have difficulty driving, watching TV, or doing any of your daily activities because of your eyesight?: No  Have you had an eye exam within the past year?: (!) No(Plans to schedule)  Hearing/Vision Interventions:  · Vision concerns:  patient encouraged to make appointment with his/her eye specialist    Safety:  Safety  Do you have working smoke detectors?: Yes  Have all throw rugs been removed or fastened?: (!) No  Do you have non-slip mats or surfaces in all bathtubs/showers?: (!) No  Do all of your stairways have a railing or banister?: Yes  Are your doorways, halls and stairs free of clutter?: Yes  Do you always fasten your seatbelt when you are in a car?: Yes  Safety Interventions:  · see pharm d note     Personalized Preventive Plan   Current Health Maintenance Status  Immunization History   Administered Date(s) Administered    Influenza Vaccine, unspecified formulation 10/16/2012, 10/15/2013    Influenza Virus Vaccine 11/30/2020    Influenza, Quadv, IM, PF (6 mo and older Fluzone, Flulaval, Fluarix, and 3 yrs and older Afluria) 10/11/2016    Pneumococcal Conjugate 13-valent ([id number]) 11/30/2020    Zoster Recombinant (Shingrix) 09/13/2019, 12/05/2019        Health Maintenance   Topic Date Due    COVID-19 Vaccine (1) Never done    DTaP/Tdap/Td vaccine (1 - Tdap) Never done    Annual Wellness Visit (AWV)  Never done    Pneumococcal 65+ years Vaccine (2 of 2 - PPSV23) 11/30/2021    Potassium monitoring  12/08/2021    Creatinine monitoring  12/08/2021    Breast cancer screen  09/15/2022    Diabetes screen  03/22/2024    Lipid screen  12/08/2025    Colon cancer screen colonoscopy  08/31/2028    DEXA (modify frequency per FRAX score)  Completed    Flu vaccine  Completed    Shingles Vaccine  Completed    Hepatitis C screen  Completed    Hepatitis A vaccine  Aged Out    Hepatitis B vaccine  Aged Out    Hib vaccine  Aged Out    Meningococcal (ACWY) vaccine  Aged Out     Recommendations for Goalbook Due: see orders and patient instructions/AVS.  . Recommended screening schedule for the next 5-10 years is provided to the patient in written form: see Patient Instructions/AVS.    Ty Han was seen today for medicare awv. Diagnoses and all orders for this visit:    Routine general medical examination at a health care facility    Essential hypertension  -     hydroCHLOROthiazide (HYDRODIURIL) 25 MG tablet; Take 1 tablet by mouth every morning  -     losartan (COZAAR) 50 MG tablet; Take 1 tablet by mouth daily  -     Vitamin D 25 Hydroxy; Future  -     Basic Metabolic Panel; Future    Enlarged thyroid    Osteopenia of multiple sites  -     XR LUMBAR SPINE (2-3 VIEWS); Future    Lumbar disc disease  -     XR LUMBAR SPINE (2-3 VIEWS); Future    Chest pressure  -     EKG 12 Lead    Intestinal malabsorption, unspecified type  -     Vitamin D 25 Hydroxy;  Future    Low blood sugar  -     POCT glycosylated hemoglobin (Hb A1C)    Hyperglycemia  -     POCT glycosylated hemoglobin (Hb A1C)

## 2021-03-22 NOTE — PATIENT INSTRUCTIONS
Personalized Preventive Plan for Kimani De La Cruz - 3/22/2021  Medicare offers a range of preventive health benefits. Some of the tests and screenings are paid in full while other may be subject to a deductible, co-insurance, and/or copay. Some of these benefits include a comprehensive review of your medical history including lifestyle, illnesses that may run in your family, and various assessments and screenings as appropriate. After reviewing your medical record and screening and assessments performed today your provider may have ordered immunizations, labs, imaging, and/or referrals for you. A list of these orders (if applicable) as well as your Preventive Care list are included within your After Visit Summary for your review. Other Preventive Recommendations:    · A preventive eye exam performed by an eye specialist is recommended every 1-2 years to screen for glaucoma; cataracts, macular degeneration, and other eye disorders. · A preventive dental visit is recommended every 6 months. · Try to get at least 150 minutes of exercise per week or 10,000 steps per day on a pedometer . · Order or download the FREE \"Exercise & Physical Activity: Your Everyday Guide\" from [employer] on NeuralStem. Call 2-908.933.8095 or search [employer] on Aging online. · You need 2380-5096 mg of calcium and 5477-3182 IU of vitamin D per day. It is possible to meet your calcium requirement with diet alone, but a vitamin D supplement is usually necessary to meet this goal.  · When exposed to the sun, use a sunscreen that protects against both UVA and UVB radiation with an SPF of 30 or greater. Reapply every 2 to 3 hours or after sweating, drying off with a towel, or swimming. · Always wear a seat belt when traveling in a car. Always wear a helmet when riding a bicycle or motorcycle.   Patient Education        Piriformis Syndrome: Care Instructions  Your Care Instructions     The piriformis muscle is deep under your rear end (buttock). One end of the muscle connects deep inside the pelvic area, and the other end attaches to the top of the thighbone. This muscle can press on the sciatic nerve that runs from your spine down your leg. When this happens, you may have pain, numbness, and tingling in the buttock and down the back of your leg. This is called piriformis syndrome. The pain may get worse when you sit for a long time or climb stairs. Also, you may be more likely to develop piriformis syndrome if you run or walk often. Your doctor will check for other causes of your pain before treating this syndrome. Treatment may include stretching exercises, massage, and medicine for the pain and swelling. If these do not help, you may get a shot of steroid medicine. Until the pain is gone, you may need to rest the muscle and limit activities like running. Exercises and a change in how you move and sit may be enough to stop the pressure on the nerve. Follow-up care is a key part of your treatment and safety. Be sure to make and go to all appointments, and call your doctor if you are having problems. It's also a good idea to know your test results and keep a list of the medicines you take. How can you care for yourself at home? If your doctor thinks that strenuous exercise is causing your problem, stop or cut back on activities such as running. You may find swimming to be a good exercise for a while. Stretch the piriformis muscle. Lie on your back. Bend one leg at the knee and keep the other leg flat on the ground. Raise your bent knee up and then move it across your body. Hold the outside of the knee with the opposite hand. Gently pull the knee with your hand toward the opposite shoulder. Hold the stretch for at least 15 to 30 seconds. Switch legs. Do the stretch several times each day. Massage the muscle to relieve pressure. Sit on the floor. Lean to one side so that the hip on your sore side is off the ground. Put a tennis ball under your buttock on that side. As you put weight onto the tennis ball, you may find spots that are especially sore. Move gently so that the tennis ball gently massages each of the sore spots. Use ice or heat to help reduce pain. Put ice or a cold pack or a heating pad set on low or a warm cloth on the sore area for 10 to 20 minutes at a time. Put a thin cloth between the ice pack or heating pad and your skin. Ask your doctor if you can take an over-the-counter pain medicine, such as acetaminophen (Tylenol), ibuprofen (Advil, Motrin), or naproxen (Aleve). Be safe with medicines. Read and follow all instructions on the label. Have your doctor or a physical therapist watch how you move. You may need physical therapy or special inserts in your shoes (orthotics) to help you move in a way that does not put pressure on your nerves. When should you call for help? Watch closely for changes in your health, and be sure to contact your doctor if:    You do not feel better after several weeks of home care.     Your pain gets worse.     Your leg becomes weak or numb. Where can you learn more? Go to https://PriceBabapeDebitos.Shareable Social. org and sign in to your [employer] account. Enter X244 in the Hyperink box to learn more about \"Piriformis Syndrome: Care Instructions. \"     If you do not have an account, please click on the \"Sign Up Now\" link. Current as of: November 16, 2020               Content Version: 12.8  © 2006-2021 [employer]VU. Care instructions adapted under license by [facility] ([facility]). If you have questions about a medical condition or this instruction, always ask your healthcare professional. [place] [address] any warranty or liability for your use of this information. Patient Education        Piriformis Syndrome: Exercises  Introduction  Here are some examples of exercises for you to try.  The exercises may be suggested for a condition or for rehabilitation. Start each exercise slowly. Ease off the exercises if you start to have pain. You will be told when to start these exercises and which ones will work best for you. How to do the exercises  Hip rotator stretch   Lie on your back with both knees bent and your feet flat on the floor. Put the ankle of your affected leg on your opposite thigh near your knee. Use your hand to gently push your knee (on your affected leg) away from your body until you feel a gentle stretch around your hip. Hold the stretch for 15 to 30 seconds. Repeat 2 to 4 times. Switch legs and repeat steps 1 through 5. Piriformis stretch   Lie on your back with your legs straight. Lift your affected leg and bend your knee. With your opposite hand, reach across your body, and then gently pull your knee toward your opposite shoulder. Hold the stretch for 15 to 30 seconds. Repeat with your other leg. Repeat 2 to 4 times on each side. Lower abdominal strengthening   Lie on your back with your knees bent and your feet flat on the floor. Tighten your belly muscles by pulling your belly button in toward your spine. Lift one foot off the floor and bring your knee toward your chest, so that your knee is straight above your hip and your leg is bent like the letter \"L. \"  Lift the other knee up to the same position. Lower one leg at a time to the starting position. Keep alternating legs until you have lifted each leg 8 to 12 times. Be sure to keep your belly muscles tight and your back still as you are moving your legs. Be sure to breathe normally. Follow-up care is a key part of your treatment and safety. Be sure to make and go to all appointments, and call your doctor if you are having problems. It's also a good idea to know your test results and keep a list of the medicines you take. Current as of: November 16, 2020               Content Version: 12.8  © 2006-2021 Healthwise, Incorporated.    Care instructions adapted under license by [facility] ([facility]). If you have questions about a medical condition or this instruction, always ask your healthcare professional. [address] any warranty or liability for your use of this information. Patient Education        Herniated Disc: Exercises  Introduction  Here are some examples of exercises for you to try. The exercises may be suggested for a condition or for rehabilitation. Start each exercise slowly. Ease off the exercises if you start to have pain. You will be told when to start these exercises and which ones will work best for you. How to stay safe  These exercises can help you move easier and feel better. But when you first start doing them, you may have more pain in your back. This is normal. But it is important to pay close attention to your pain during and after each exercise. Keep doing these exercises if your pain stays the same or moves from your leg and buttock more toward the middle of your spine. Pain moving out of your leg and buttock is a good sign. Stop doing these exercises if your pain gets worse in your leg and buttock. Stop if you start to have pain in your leg and buttock that you didn't have before. Be sure to do these exercises in the order they appear. Note how your pain changes before you move to the next one. If your pain is much worse right after exercise and stays worse the next day, do not do any of these exercises. How to do the exercises  1. Rest on belly   Lie on your stomach, with your head turned to the side. Try to relax your lower back muscles as much as you can. Continue to lie on your stomach for 2 minutes. Keep your arms beside your body. If that position bothers your neck, place your hands, one on top of the other, underneath your forehead. This will help support your head and neck. If lying in this position causes or increases pain down your leg, stop this exercise and do not do the next exercises.     2. Press-up back extension   Lie on your stomach, with your face down and your elbows tucked into your sides and under your shoulders. Press your elbows down into the floor to raise your upper back. Hold this position for 15 to 30 seconds. Repeat 2 to 4 times. As you do this, relax your stomach muscles and allow your back to arch without using your back muscles. Let your low back relax completely as you arch up. Don't let your hips or pelvis come off the floor. Then relax, and return to the start position. Over time, work up to staying in the press-up position for up to 2 minutes. If lying in this position causes or increases pain down your leg, stop this exercise and do not do the next exercises. 3. Full press-up back extension   Lie on your stomach with your face down, keeping your elbows tucked into your sides and under your shoulders. Straighten your elbows, and push your upper body up as far as you can. Hold this position for 5 seconds, and then relax. Repeat 10 times. Allow your lower back to sag. Keep your hips, pelvis, and legs relaxed. Each time, try to raise your upper body a little higher and hold your arms a bit straighter. If lying in this position causes or increases pain down your leg, stop this exercise and do not do the next exercises. If you can't do this exercise, you may instead try the backward bend exercise that follows. 4. Backward bend   Stand with your feet hip-width apart, and don't lock your knees. Place your hands in the small of your back. Bend backward as far as you can, keeping your knees straight. Repeat 2 to 4 times. Your toes should be pointing forward. Hold this position for 2 to 3 seconds. Then return to your starting position. Each time, try to bend backward a little farther, until you bend backward as far as you can. If standing in this position causes or increases pain down your leg, stop this exercise.     Follow-up care is a key part of your treatment and safety. Be sure to make and go to all appointments, and call your doctor if you are having problems. It's also a good idea to know your test results and keep a list of the medicines you take. Where can you learn more? Go to https://chpepiceweb.Springbok Services. org and sign in to your [employer] account. Enter O734 in the Field Dailies box to learn more about \"Herniated Disc: Exercises. \"     If you do not have an account, please click on the \"Sign Up Now\" link. Current as of: November 16, 2020               Content Version: 12.8  © 2006-2021 nDreams. Care instructions adapted under license by [facility] ([facility]). If you have questions about a medical condition or this instruction, always ask your healthcare professional. [address] any warranty or liability for your use of this information. Patient Education         Home Treatment for Herniated Disc Pain (02:11)  Your health professional recommends that you watch this short online health video. Learn how home treatment of back and leg pain from a herniated disc can help you avoid surgery. How to watch the video    Scan the QR code   OR Visit the website    https://hwi. se/r/Hfpdis3ets6qv   Current as of: November 16, 2020               Content Version: 12.8  © 2006-2021 nDreams. Care instructions adapted under license by [employer] ([facility]). If you have questions about a medical condition or this instruction, always ask your healthcare professional. [employer] any warranty or liability for your use of this information. Patient Education        Therapeutic Ball: Back Exercises  Introduction  Here are some examples of typical exercises for your condition. Start each exercise slowly. Ease off the exercise if you start to have pain. Your doctor or physical therapist will tell you when you can start these exercises and which ones will work best for you.   To prepare, make sure that your ball is the right size for you. When inflated and firm, it should allow you to sit with your hips and knees bent at about a 90-degree angle (like the letter L). How to do the exercises  Seated position on ball   Use this exercise to get used to moving on the ball and to find your best sitting position. Sit comfortably on the ball with your feet about hip-width apart. If you feel unsteady, rest your hands on the ball near your hips. As you do this exercise, try to keep your shoulders and upper body relaxed and still. Using your stomach and back muscles to move your pelvis, roll the ball forward. This will round your back. Still using your stomach and back muscles, roll the ball back. You will arch your back. Repeat this rounding-arching motion a few times. Stop in between the two positions, where your back is not rounded or arched. This is called your neutral position. Pelvic rotation   Sit tall on the ball. Slowly rotate your hips in a [place] pattern. Keep the movement focused at your hips. Repeat, but [place] in the other direction. Repeat 8 to 12 times. Postural sitting   Use this position to find a stable, relaxed posture on the ball. You can use this position as your starting point for other ball exercises. If you feel unsteady on the ball, start on a chair first.  Sit on a ball or chair, with your feet planted straight in front of you. Imagine that a string at the top of your head is pulling you straight up. Think of yourself as 2 inches taller than you are. Slightly tuck your chin. Keep your shoulders back and relaxed. Knee extension   Sit tall on the ball with your feet planted in front of you, hip-width apart. As you do this exercise, avoid slumping your shoulders and arching your back. Rest your hands on the ball near your hip or a steady object next to you.  (If you feel very stable on the ball, rest your hands in your lap or at your side.)  Slowly straighten one leg at the knee. Slowly lower it back down. Repeat with the other leg. Repeat this exercise 8 to 12 times. Roll-ups   Lie on your back with your knees bent, feet resting on the floor. Lay the ball on your thighs. Rest your hands up high on the ball. Raising your head and shoulder blades, roll the ball up your thighs. Exhale as you roll up. If this is hard on your neck, gently support your lower head and upper neck with one hand. Don't use that hand to pull your head up. Repeat 8 to 12 times. Ball curls   Lie on your back with your ankles resting on the ball, knees straight. Use your legs to roll the exercise ball toward you. Allow your knees to bend and move closer to your chest.  Pause briefly, and then roll the ball to the starting position. Try to keep the ball rolling straight. You will feel the muscles in your lower belly working. Repeat 8 to 12 times. Bridge with ball under legs   Lie on your back with your legs up, calves resting on the ball. For more challenge, rest your heels on the ball. Look up at the ceiling, and keep your chin relaxed. You can place a small pillow under your head or neck for comfort. With your arms by your side, press your hands onto the floor for stability. Tighten your belly muscles by pulling in your belly button toward your spine. Push your heels down toward the floor, squeeze your buttocks, and lift your hips off the floor until your shoulders, hips, and knees are all in a straight line. Try to keep the ball steady. Hold for about 6 seconds as you continue to breathe normally. Slowly lower your hips back down to the floor. Repeat 8 to 12 times. Ball curls with bridge   Start flat on your back with your ankles resting on the ball. Look up at the ceiling, and keep your chin relaxed. You can place a small pillow under your head or neck for comfort. With your arms by your side, press your hands onto the floor for stability.   Tighten your belly muscles by pulling in your belly button toward your spine. Push your heels down toward the floor, squeeze your buttocks, and lift your hips off the floor until your shoulders, hips, and knees are all in a straight line. While holding the bridge position, roll the ball toward you with your heels. Keep your hips as level as you can. Pause briefly, and then roll the ball back out. Try to keep the ball rolling straight. You will feel the muscles in your lower belly working as you straighten your legs. Lower your hips, and return to your starting position. Repeat 8 to 12 times. When you can keep your body and the ball steady throughout this exercise, you're ready for more challenge. Try keeping your hips raised while rolling the ball out, holding the bridge, and rolling back, a few times in a row. Praying maribel Luna upright with the ball in front of you. To start, clasp your hands together. Rest them on the ball in front of you. As you do this exercise, keep your back and hips straight and tighten your belly and buttocks muscles. Keep your knees in place. Press on the ball with your arms. Lean forward from the knees. This rolls the ball forward. You will bear most of your weight on your arms. If your back starts to ache, you've gone too far. Pull back a bit. Roll back to the start position. Repeat 8 to 12 times. Walk-out plank on ball   Kneel over the ball. Place your hands on the floor in front of you. Walk your hands forward until your legs are straight on the ball. This is the plank position. When in plank position, hold your body straight and tighten your belly and buttocks muscles. Keep your chin slightly tucked. Roll as far forward as you can without losing your balance or letting your hips drop. You may stop with the ball under your thighs, or even under your knees or shins. Hold a few seconds, then walk your hands back and return to the start position. Repeat 8 to 12 times.     Push-up with thighs on ball   Kneel over the ball. Place your hands on the floor in front of you. Walk your hands forward until your legs are straight on the ball. This is the plank position. When in plank position, hold your body straight and tighten your belly and buttocks muscles. Keep your chin slightly tucked. Roll as far forward as you can without losing your balance or letting your hips drop. You may stop with the ball under your thighs, or even under your knees or shins. Bend your elbows. Slowly lower your body toward the ground as far as you can without losing your balance. If your wrists hurt, try moving your hands a little farther apart so they're not right under your shoulders. Slowly straighten your arms. Do 8 to 12 of these push-ups. Wall squat with ball   Stand facing away from a wall. Place your feet about shoulder-width apart. Place the ball between your middle back and the wall. Move your feet out in front of you so they are about a foot in front of your hips. Keep your arms at your sides, or put your hands on your hips. Slowly squat down as if you are going to sit in a chair, rolling your back over the ball as you squat. The ball should move with you but stay pressed into the wall. Be sure that your knees do not go in front of your toes as you squat. Hold for 6 seconds. Slowly rise to your standing position. Repeat 8 to 12 times. Child's pose with ball   Kneeling upright with your back straight, rest your hands on the ball in front of you. Breathe out as you bend at the hips, and roll the ball forward. Lower your chest toward the ground, and drop your hips back toward your heels. To stretch your upper back and shoulders, hold this position for 15 to 30 seconds. Repeat 2 to 4 times. Follow-up care is a key part of your treatment and safety. Be sure to make and go to all appointments, and call your doctor if you are having problems.  It's also a good idea to know your test results and keep a list of the medicines you take. Where can you learn more? Go to https://chpepiceweb.healthMediaPhypartners. org and sign in to your [employer] account. Enter T071 in the Lotour.comhire box to learn more about \"Therapeutic Ball: Back Exercises. \"     If you do not have an account, please click on the \"Sign Up Now\" link. Current as of: November 16, 2020               Content Version: 12.8  © 2006-2021 Healthwise, Incorporated. Care instructions adapted under license by [facility] ([facility]). If you have questions about a medical condition or this instruction, always ask your healthcare professional. [place] [address] any warranty or liability for your use of this information. Patient Education        Low Back Pain: Exercises  Introduction  Here are some examples of exercises for you to try. The exercises may be suggested for a condition or for rehabilitation. Start each exercise slowly. Ease off the exercises if you start to have pain. You will be told when to start these exercises and which ones will work best for you. How to do the exercises  Press-up   Lie on your stomach, supporting your body with your forearms. Press your elbows down into the floor to raise your upper back. As you do this, relax your stomach muscles and allow your back to arch without using your back muscles. As your press up, do not let your hips or pelvis come off the floor. Hold for 15 to 30 seconds, then relax. Repeat 2 to 4 times. Alternate arm and leg (bird dog) exercise   Do this exercise slowly. Try to keep your body straight at all times, and do not let one hip drop lower than the other. Start on the floor, on your hands and knees. Tighten your belly muscles. Raise one leg off the floor, and hold it straight out behind you. Be careful not to let your hip drop down, because that will twist your trunk. Hold for about 6 seconds, then lower your leg and switch to the other leg.   Repeat 8 to 12 times on each leg.  Over time, work up to holding for 10 to 30 seconds each time. If you feel stable and secure with your leg raised, try raising the opposite arm straight out in front of you at the same time. Knee-to-chest exercise   Lie on your back with your knees bent and your feet flat on the floor. Bring one knee to your chest, keeping the other foot flat on the floor (or keeping the other leg straight, whichever feels better on your lower back). Keep your lower back pressed to the floor. Hold for at least 15 to 30 seconds. Relax, and lower the knee to the starting position. Repeat with the other leg. Repeat 2 to 4 times with each leg. To get more stretch, put your other leg flat on the floor while pulling your knee to your chest.    Curl-ups   Lie on the floor on your back with your knees bent at a 90-degree angle. Your feet should be flat on the floor, about 12 inches from your buttocks. Cross your arms over your chest. If this bothers your neck, try putting your hands behind your neck (not your head), with your elbows spread apart. Slowly tighten your belly muscles and raise your shoulder blades off the floor. Keep your head in line with your body, and do not press your chin to your chest.  Hold this position for 1 or 2 seconds, then slowly lower yourself back down to the floor. Repeat 8 to 12 times. Pelvic tilt exercise   Lie on your back with your knees bent. \"Brace\" your stomach. This means to tighten your muscles by pulling in and imagining your belly button moving toward your spine. You should feel like your back is pressing to the floor and your hips and pelvis are rocking back. Hold for about 6 seconds while you breathe smoothly. Repeat 8 to 12 times. Heel dig bridging   Lie on your back with both knees bent and your ankles bent so that only your heels are digging into the floor. Your knees should be bent about 90 degrees.   Then push your heels into the floor, squeeze your buttocks, and lift your hips off the floor until your shoulders, hips, and knees are all in a straight line. Hold for about 6 seconds as you continue to breathe normally, and then slowly lower your hips back down to the floor and rest for up to 10 seconds. Do 8 to 12 repetitions. Hamstring stretch in doorway   Lie on your back in a doorway, with one leg through the open door. Slide your leg up the wall to straighten your knee. You should feel a gentle stretch down the back of your leg. Hold the stretch for at least 15 to 30 seconds. Do not arch your back, point your toes, or bend either knee. Keep one heel touching the floor and the other heel touching the wall. Repeat with your other leg. Do 2 to 4 times for each leg. Hip flexor stretch   Kneel on the floor with one knee bent and one leg behind you. Place your forward knee over your foot. Keep your other knee touching the floor. Slowly push your hips forward until you feel a stretch in the upper thigh of your rear leg. Hold the stretch for at least 15 to 30 seconds. Repeat with your other leg. Do 2 to 4 times on each side. Wall sit   Stand with your back 10 to 12 inches away from a wall. Lean into the wall until your back is flat against it. Slowly slide down until your knees are slightly bent, pressing your lower back into the wall. Hold for about 6 seconds, then slide back up the wall. Repeat 8 to 12 times. Follow-up care is a key part of your treatment and safety. Be sure to make and go to all appointments, and call your doctor if you are having problems. It's also a good idea to know your test results and keep a list of the medicines you take. Where can you learn more? Go to https://People Publishingsilvano.Etable. org and sign in to your [employer]Sync account. Enter X966 in the [employer] box to learn more about \"Low Back Pain: Exercises. \"     If you do not have an account, please click on the \"Sign Up Now\" link.   Current as of: November 16, 2020               Content Version: 12.8  © 2006-2021 Healthwise, [email or web address]. Care instructions adapted under license by [facility] ([facility]). If you have questions about a medical condition or this instruction, always ask your healthcare professional. [address] any warranty or liability for your use of this information. Patient Education        Acute Low Back Pain: Exercises  Introduction  Here are some examples of typical rehabilitation exercises for your condition. Start each exercise slowly. Ease off the exercise if you start to have pain. Your doctor or physical therapist will tell you when you can start these exercises and which ones will work best for you. When you are not being active, find a comfortable position for rest. Some people are comfortable on the floor or a medium-firm bed with a small pillow under their head and another under their knees. Some people prefer to lie on their side with a pillow between their knees. Don't stay in one position for too long. Take short walks (10 to 20 minutes) every 2 to 3 hours. Avoid slopes, hills, and stairs until you feel better. Walk only distances you can manage without pain, especially leg pain. How to do the exercises  Back stretches   Get down on your hands and knees on the floor. Relax your head and allow it to droop. Round your back up toward the ceiling until you feel a nice stretch in your upper, middle, and lower back. Hold this stretch for as long as it feels comfortable, or about 15 to 30 seconds. Return to the starting position with a flat back while you are on your hands and knees. Let your back sway by pressing your stomach toward the floor. Lift your buttocks toward the ceiling. Hold this position for 15 to 30 seconds. Repeat 2 to 4 times. Follow-up care is a key part of your treatment and safety. Be sure to make and go to all appointments, and call your doctor if you are having problems.  It's also a good idea to know your test results and keep a list of the medicines you take. Where can you learn more? Go to https://chpepiceweb.[employer]. org and sign in to your FileTrek account. Enter P411 in the [facility] box to learn more about \"Acute Low Back Pain: Exercises. \"     If you do not have an account, please click on the \"Sign Up Now\" link. Current as of: November 16, 2020               Content Version: 12.8  © 2006-2021 [employer]. Care instructions adapted under license by [facility] ([facility]). If you have questions about a medical condition or this instruction, always ask your healthcare professional. [address] any warranty or liability for your use of this information. Patient Education        Sciatica: Exercises  Introduction  Here are some examples of typical rehabilitation exercises for your condition. Start each exercise slowly. Ease off the exercise if you start to have pain. Your doctor or physical therapist will tell you when you can start these exercises and which ones will work best for you. When you are not being active, find a comfortable position for rest. Some people are comfortable on the floor or a medium-firm bed with a small pillow under their head and another under their knees. Some people prefer to lie on their side with a pillow between their knees. Don't stay in one position for too long. Take short walks (10 to 20 minutes) every 2 to 3 hours. Avoid slopes, hills, and stairs until you feel better. Walk only distances you can manage without pain, especially leg pain. How to do the exercises  Back stretches   Get down on your hands and knees on the floor. Relax your head and allow it to droop. Round your back up toward the ceiling until you feel a nice stretch in your upper, middle, and lower back. Hold this stretch for as long as it feels comfortable, or about 15 to 30 seconds.   Return to the starting position with a flat back while you are on your hands and knees. Let your back sway by pressing your stomach toward the floor. Lift your buttocks toward the ceiling. Hold this position for 15 to 30 seconds. Repeat 2 to 4 times. Follow-up care is a key part of your treatment and safety. Be sure to make and go to all appointments, and call your doctor if you are having problems. It's also a good idea to know your test results and keep a list of the medicines you take. Where can you learn more? Go to https://KaboozapeRockbot.ProFounder. org and sign in to your Stitch Fix account. Enter O980 in the [employer] box to learn more about \"Sciatica: Exercises. \"     If you do not have an account, please click on the \"Sign Up Now\" link. Current as of: November 16, 2020               Content Version: 12.8  © 4485-7649 Healthwise, [employer]. Care instructions adapted under license by [facility] ([facility]). If you have questions about a medical condition or this instruction, always ask your healthcare professional. [place] [address] any warranty or liability for your use of this information. Personalized Preventive Plan for Martha Cheung - 3/22/2021  Medicare offers a range of preventive health benefits. Some of the tests and screenings are paid in full while other may be subject to a deductible, co-insurance, and/or copay. Some of these benefits include a comprehensive review of your medical history including lifestyle, illnesses that may run in your family, and various assessments and screenings as appropriate. After reviewing your medical record and screening and assessments performed today your provider may have ordered immunizations, labs, imaging, and/or referrals for you. A list of these orders (if applicable) as well as your Preventive Care list are included within your After Visit Summary for your review.     Other Preventive Recommendations:    A preventive eye exam performed by an eye specialist is recommended every 1-2 years to screen for glaucoma; cataracts, macular degeneration, and other eye disorders. A preventive dental visit is recommended every 6 months. Try to get at least 150 minutes of exercise per week or 10,000 steps per day on a pedometer . Order or download the FREE \"Exercise & Physical Activity: Your Everyday Guide\" from [employer]. Call 5-242.247.9779 or search [employer] online. You need 1305-8109 mg of calcium and 4583-9194 IU of vitamin D per day. It is possible to meet your calcium requirement with diet alone, but a vitamin D supplement is usually necessary to meet this goal.  When exposed to the sun, use a sunscreen that protects against both UVA and UVB radiation with an SPF of 30 or greater. Reapply every 2 to 3 hours or after sweating, drying off with a towel, or swimming. Always wear a seat belt when traveling in a car. Always wear a helmet when riding a bicycle or motorcycle.

## 2021-03-22 NOTE — PROGRESS NOTES
Medicare Annual Wellness Visit  Name: Nupur Hensley Date: 3/22/2021   MRN: [id number] Sex: Female   Age: 76 y.o. Ethnicity: Non-/Non    : 1955 Race: Kris Calderon is here for Medicare AWV    Screenings for behavioral, psychosocial and functional/safety risks, and cognitive dysfunction are all negative except as indicated below. These results, as well as other patient data from the [address] form, are documented in Flowsheets linked to this Encounter. Allergies   Allergen Reactions    Seasonal          Prior to Visit Medications    Medication Sig Taking?  Authorizing Provider   losartan (COZAAR) 50 MG tablet Take 1 tablet by mouth daily Yes Vi Mclaughlin MD   hydroCHLOROthiazide (HYDRODIURIL) 25 MG tablet Take 1 tablet by mouth every morning Yes Vi Mclaughlin MD   DHEA 50 MG TABS Take 50 mg by mouth once Yes Historical Provider, MD   MAGNESIUM PO Take 400 mg by mouth daily Yes Historical Provider, MD   CINNAMON PO Take by mouth daily 2000mg Yes Historical Provider, MD   vitamin E 400 UNIT capsule Take 400 Units by mouth daily  Yes Historical Provider, MD   b complex vitamins capsule Take 1 capsule by mouth daily  Yes Historical Provider, MD   Cholecalciferol (VITAMIN D3) 25 MCG (1000 UT) CAPS Take 1 capsule by mouth daily  Yes Historical Provider, MD   fluticasone (FLOVENT HFA) 110 MCG/ACT inhaler Inhale 2 puffs into the lungs 2 times daily  Patient not taking: Reported on 3/22/2021  Nahid Irizarry DO   albuterol sulfate HFA (VENTOLIN HFA) 108 (90 Base) MCG/ACT inhaler Inhale 2 puffs into the lungs 4 times daily as needed for Wheezing  Patient not taking: Reported on 3/22/2021  Nahid Irizarry DO   Lactobacillus (PROBIOTIC ACIDOPHILUS PO) Take by mouth daily   Historical Provider, MD   Multiple Minerals-Vitamins (LAITH-MAG-ZINC-D PO) Take 1 tablet by mouth daily   Historical Provider, MD   Multiple Vitamins-Minerals (HAIR/SKIN/NAILS/BIOTIN PO) Take 1 tablet by mouth daily   Historical Provider, MD         Past Medical History:   Diagnosis Date    Cancer [facility])     breast       Past Surgical History:   Procedure Laterality Date    APPENDECTOMY      BREAST LUMPECTOMY       SECTION      x 2    TUBAL LIGATION      WISDOM TOOTH EXTRACTION           Family History   Problem Relation Age of Onset    Heart Disease Mother     Heart Disease Father     Cancer Sister     High Blood Pressure Sister     Obesity Brother     Diabetes Brother     High Blood Pressure Brother     Cancer Sister         melanoma    High Blood Pressure Sister     Substance Abuse Sister         smoker    High Blood Pressure Sister     High Blood Pressure Brother     Heart Attack Brother        CareTeam (Including outside providers/suppliers regularly involved in providing care):   Patient Care Team:  Elisabeth Joyce DO as PCP - General (Family Medicine)  Elisabeth Joyce DO as PCP - [facility] Provider    Wt Readings from Last 3 Encounters:   21 184 lb 12.8 oz (83.8 kg)   20 187 lb 9.6 oz (85.1 kg)   08/10/20 189 lb (85.7 kg)     Vitals:    21 1046   BP: 118/80   Site: Right Upper Arm   Position: Sitting   Cuff Size: Medium Adult   Pulse: 61   Resp: 16   Temp: 97.4 °F (36.3 °C)   TempSrc: Temporal   SpO2: 97%   Weight: 184 lb 12.8 oz (83.8 kg)   Height: 5' 3\" (1.6 m)     Body mass index is 32.74 kg/m². Based upon direct observation of the patient, evaluation of cognition reveals recent and remote memory intact. Patient's complete Health Risk Assessment and screening values have been reviewed and are found in Flowsheets. The following problems were reviewed today and where indicated follow up appointments were made and/or referrals ordered. Positive Risk Factor Screenings with Interventions:            General Health and ACP:  General  In general, how would you say your health is?: Good  In the past 7 days, have you experienced any of the following?  New or Increased Pain, New or Increased Fatigue, Loneliness, Social Isolation, Stress or Anger?: (!) New or Increased Pain, Stress   Do you get the social and emotional support that you need?: Yes  Do you have a Living Will?: Yes  Advance Directives     Power of  Living Will ACP-Advance Directive ACP-Power of     Not on File Not on File Not on File Not on File      General Health Risk Interventions:  · Fatigue: patient declines any further evaluation/treatment for this issue  · Stress: regular exercise recommended- 3-5 times per week, 30-45 minutes per session   · Caregiver-related stress    Health Habits/Nutrition:  Health Habits/Nutrition  Do you exercise for at least 20 minutes 2-3 times per week?: Yes  Have you lost any weight without trying in the past 3 months?: (!) Yes(During COVID-19)  Do you eat only one meal per day?: No  Have you seen the dentist within the past year?: Yes  Body mass index: (!) 32.73  Health Habits/Nutrition Interventions:  · Due for vision exam    Hearing/Vision:  No exam data present  Hearing/Vision  Do you or your family notice any trouble with your hearing that hasn't been managed with hearing aids?: (!) Yes(tinnitus)  Do you have difficulty driving, watching TV, or doing any of your daily activities because of your eyesight?: No  Have you had an eye exam within the past year?: (!) No(Plans to schedule)    Safety:  Safety  Do you have working smoke detectors?: Yes  Have all throw rugs been removed or fastened?: (!) No  Do you have non-slip mats or surfaces in all bathtubs/showers?: (!) No  Do all of your stairways have a railing or banister?: Yes  Are your doorways, halls and stairs free of clutter?: Yes  Do you always fasten your seatbelt when you are in a car?: Yes     Personalized Preventive Plan   Current Health Maintenance Status  Immunization History   Administered Date(s) Administered    Influenza Vaccine, unspecified formulation 10/16/2012, 10/15/2013    Influenza Virus Vaccine 11/30/2020    Influenza, Quadv, IM, PF (6 mo and older Fluzone, Flulaval, Fluarix, and 3 yrs and older Afluria) 10/11/2016    Pneumococcal Conjugate 13-valent (Jerris Yordan) 11/30/2020    Zoster Recombinant (Shingrix) 09/13/2019, 12/05/2019        Health Maintenance   Topic Date Due    COVID-19 Vaccine (1) Never done    DTaP/Tdap/Td vaccine (1 - Tdap) Never done   [employer] Visit (AWV)  Never done    Diabetes screen  06/04/2021    Pneumococcal 65+ years Vaccine (2 of 2 - PPSV23) 11/30/2021    Potassium monitoring  12/08/2021    Creatinine monitoring  12/08/2021    Breast cancer screen  09/15/2022    Lipid screen  12/08/2025    Colon cancer screen colonoscopy  08/31/2028    DEXA (modify frequency per FRAX score)  Completed    Flu vaccine  Completed    Shingles Vaccine  Completed    Hepatitis C screen  Completed    Hepatitis A vaccine  Aged Out    Hepatitis B vaccine  Aged Out    Hib vaccine  Aged Out    Meningococcal (ACWY) vaccine  Aged Out     The 10-year ASCVD risk score (Roseann Duggan, et al., 2013) is: 6.3%    Values used to calculate the score:      Age: 76 years      Sex: Female      Is Non- : No      Diabetic: No      Tobacco smoker: No      Systolic Blood Pressure: 500 mmHg      Is BP treated: Yes      HDL Cholesterol: 63 mg/dL      Total Cholesterol: 177 mg/dL    Recommendations for Preventive Services Due: see orders and patient instructions/AVS.    Hypertension  -Continue losartan 50, hydrochlorothiazide 25  -Home readings: SBP: 120s    Osteopenia  -Last DEXA 8/2019: osteopenia  -Recommend increase to 2000 units daily, recheck level    Emphysema  -continue Flovent, rescue inhaler as needed - has not needed in over 2 months    Vitamin D insufficiency   -last level 23; repeat level in December  -Recommend increase in vitamin D from 1000 to 2000 units daily    General Wellness  -Due for tdap - recommend at least 2 weeks after COVID vaccine  -Plans to get the COVID-19    Recommended screening schedule for the next 5-10 years is provided to the patient in written form: see Patient Instructions/AVS.    ITrista, [facility], 3/22/2021, performed the documented evaluation under the direct supervision of the attending physician. CLINICAL PHARMACY CONSULT: MED RECONCILIATION/REVIEW ADDENDUM    For Pharmacy Admin Tracking Only    PHSO: Yes  Total # of Interventions Recommended: 3  - Increased Dose #: 1  - Refills Provided #: 2  - Updated Order #: 1 Updated Order Reason(s):  Other  - Maintenance Safety Lab Monitoring #: 2  Total Interventions Accepted: 3  Time Spent (min): 15    Trista Rivers, PharmD

## 2021-03-24 ENCOUNTER — HOSPITAL ENCOUNTER (OUTPATIENT)
Dept: GENERAL RADIOLOGY | Age: 66
Discharge: HOME | End: 2021-03-24
Payer: MEDICARE

## 2021-03-24 ENCOUNTER — HOSPITAL ENCOUNTER (OUTPATIENT)
Age: 66
Discharge: HOME | End: 2021-03-24
Payer: MEDICARE

## 2021-03-24 DIAGNOSIS — M51.9 LUMBAR DISC DISEASE: ICD-10-CM

## 2021-03-24 DIAGNOSIS — M85.89 OSTEOPENIA OF MULTIPLE SITES: ICD-10-CM

## 2021-03-24 PROCEDURE — 72100 X-RAY EXAM L-S SPINE 2/3 VWS: CPT

## 2021-03-29 ENCOUNTER — IMMUNIZATION (OUTPATIENT)
Dept: PRIMARY CARE CLINIC | Age: 66
End: 2021-03-29
Payer: MEDICARE

## 2021-03-29 PROCEDURE — 91300 COVID-19, PFIZER VACCINE 30MCG/0.3ML DOSE: CPT | Performed by: FAMILY MEDICINE

## 2021-03-29 PROCEDURE — 0001A COVID-19, PFIZER VACCINE 30MCG/0.3ML DOSE: CPT | Performed by: FAMILY MEDICINE

## 2021-04-19 ENCOUNTER — IMMUNIZATION (OUTPATIENT)
Dept: PRIMARY CARE CLINIC | Age: 66
End: 2021-04-19
Payer: MEDICARE

## 2021-04-19 PROCEDURE — 0002A COVID-19, PFIZER VACCINE 30MCG/0.3ML DOSE: CPT | Performed by: FAMILY MEDICINE

## 2021-04-19 PROCEDURE — 91300 COVID-19, PFIZER VACCINE 30MCG/0.3ML DOSE: CPT | Performed by: FAMILY MEDICINE

## 2021-07-06 ENCOUNTER — TELEPHONE (OUTPATIENT)
Dept: FAMILY MEDICINE CLINIC | Age: 66
End: 2021-07-06

## 2021-07-06 NOTE — TELEPHONE ENCOUNTER
Patient requesting referral to dermatology, podiatry and possibly plastic surgery. Patient has no preference for referring to provider. Please advise.

## 2021-07-09 ENCOUNTER — OFFICE VISIT (OUTPATIENT)
Dept: FAMILY MEDICINE CLINIC | Age: 66
End: 2021-07-09
Payer: MEDICARE

## 2021-07-09 ENCOUNTER — TELEPHONE (OUTPATIENT)
Dept: SURGERY | Age: 66
End: 2021-07-09

## 2021-07-09 VITALS
BODY MASS INDEX: 32.28 KG/M2 | HEIGHT: 63 IN | SYSTOLIC BLOOD PRESSURE: 136 MMHG | HEART RATE: 59 BPM | OXYGEN SATURATION: 99 % | RESPIRATION RATE: 16 BRPM | TEMPERATURE: 97.7 F | DIASTOLIC BLOOD PRESSURE: 82 MMHG | WEIGHT: 182.2 LBS

## 2021-07-09 DIAGNOSIS — M67.479 GANGLION CYST OF FOOT: ICD-10-CM

## 2021-07-09 DIAGNOSIS — Z11.1 SCREENING EXAMINATION FOR PULMONARY TUBERCULOSIS: ICD-10-CM

## 2021-07-09 DIAGNOSIS — K90.9 INTESTINAL MALABSORPTION, UNSPECIFIED TYPE: ICD-10-CM

## 2021-07-09 DIAGNOSIS — J43.8 OTHER EMPHYSEMA (HCC): ICD-10-CM

## 2021-07-09 DIAGNOSIS — L98.9 BENIGN SKIN LESION OF NOSE: ICD-10-CM

## 2021-07-09 DIAGNOSIS — Z11.1 ENCOUNTER FOR PPD TEST: ICD-10-CM

## 2021-07-09 DIAGNOSIS — C50.919: ICD-10-CM

## 2021-07-09 DIAGNOSIS — L57.0 KERATOTIC LESION: Primary | ICD-10-CM

## 2021-07-09 PROCEDURE — 99214 OFFICE O/P EST MOD 30 MIN: CPT | Performed by: STUDENT IN AN ORGANIZED HEALTH CARE EDUCATION/TRAINING PROGRAM

## 2021-07-09 PROCEDURE — 86580 TB INTRADERMAL TEST: CPT | Performed by: FAMILY MEDICINE

## 2021-07-09 ASSESSMENT — ENCOUNTER SYMPTOMS
DIARRHEA: 0
EYE PAIN: 0
VOMITING: 0
COUGH: 0
CONSTIPATION: 0
TROUBLE SWALLOWING: 0
ABDOMINAL PAIN: 0
NAUSEA: 0
BLOOD IN STOOL: 0

## 2021-07-09 NOTE — PROGRESS NOTES
[address] [facility] [address] [address]  Dept: [phone]  Loc: [address] (:  1955) is a 78 y.o. female,Established patient, here for evaluation of the following chief complaint(s):  Skin Lesion (end of nose and would like a referal to dermatology)      ASSESSMENT/PLAN:  1. Keratotic lesion  -     Subacute unimproved keratotic lesion of the nose  - Not freezing due to potential cosmetic healing properties of cryo treatment on the face  - Referral to plastic surgery for evaluation and treatment  - Seven Gutiérrez MD, Plastic Surgery, [address] Metabolic Panel; Future  -     CBC With Auto Differential; Future  2. Benign skin lesion of nose  -     Chronic unimproved lesion of the nose  - Referral to plastic surgery for evaluation and treatment  -  Seven Gutiérrez MD, Plastic Surgery, [place]  -     Comprehensive Metabolic Panel; Future  -     CBC With Auto Differential; Future  3. Ganglion cyst of foot  - Worsening ganglion cyst of the plantar surface right second phalange of foot. -  Referred to podiatry for evaluation and treatment  -     [facility] - Elbert Shore DPM, Podiatry, [place]  -     Comprehensive Metabolic Panel; Future  -     CBC With Auto Differential; Future  4. Intestinal malabsorption, unspecified type  - Continued intestinal malabsorption  -     Vitamin D 25 Hydroxy; Future  -     Magnesium; Future  5. Other emphysema ([address]  Assessment & Plan:   Well-controlled, continue current medications  6. Primary malignant neoplasm of breast without evidence of distant metastasis, unspecified laterality ([address]   - Currently stable    Return in about 3 months (around 10/9/2021). SUBJECTIVE/OBJECTIVE:  HPI  Patient presents with hypopigmentation on the tip of her nose. Noted last summer more prominent during tony seasons.   Family history of sister with similar Feet:    Skin:     General: Skin is warm and dry. Findings: Lesion (Keratotic lesion on nose, left of midline. Hypopigmentation of nose midline) present. Neurological:      Mental Status: She is alert and oriented to person, place, and time. Cranial Nerves: No cranial nerve deficit. Psychiatric:         Behavior: Behavior normal.         Thought Content: Thought content normal.         Judgment: Judgment normal.         Vitals:    07/09/21 1106   BP: 136/82   Site: Right Upper Arm   Position: Sitting   Cuff Size: Medium Adult   Pulse: 59   Resp: 16   Temp: 97.7 °F (36.5 °C)   TempSrc: Oral   SpO2: 99%   Weight: 182 lb 3.2 oz (82.6 kg)   Height: 5' 3\" (1.6 m)         An electronic signature was used to authenticate this note.     --Keyana Rosas MD

## 2021-07-09 NOTE — PATIENT INSTRUCTIONS
Thank you   1. Thank you for trusting us with your healthcare needs. You may receive a survey regarding today's visit. It would help us out if you would take a few moments to provide your feedback. We value your input. 2. Please bring in ALL medications BOTTLES, including inhalers, herbal supplements, over the counter, prescribed & non-prescribed medicine. The office would like actual medication bottles and a list.   3. Please note our OFFICE POLICIES:   a. Prior to getting your labs drawn, please check with your insurance company for benefits and eligibility of lab services. Often, insurance companies cover certain tests for preventative visits only. It is patient's responsibility to see what is covered. b. We are unable to change a diagnosis after the test has been performed. c. Lab orders will not be re-printed. Please hold onto your original lab orders and take them to your lab to be completed. d. If you no show your scheduled appointment three times, you will be dismissed from this practice. e. Zainab Pink must be completed 24 hours prior to your schedule appointment. 4. If the list below has been completed, PLEASE FAX RECORDS TO OUR OFFICE @ [phone].  Once the records have been received we will update your records at our office:  Health Maintenance Due   Topic Date Due    DTaP/Tdap/Td vaccine (1 - Tdap) Never done

## 2021-07-09 NOTE — PROGRESS NOTES
S: 78 y.o. female with   Chief Complaint   Patient presents with    Skin Lesion     end of nose and would like a referal to dermatology       HPI: please see resident note for HPI and ROS. BP Readings from Last 3 Encounters:   07/09/21 136/82   03/22/21 118/80   12/14/20 136/86     Wt Readings from Last 3 Encounters:   07/09/21 182 lb 3.2 oz (82.6 kg)   03/22/21 184 lb 12.8 oz (83.8 kg)   12/14/20 187 lb 9.6 oz (85.1 kg)       O: VS:  height is 5' 3\" (1.6 m) and weight is 182 lb 3.2 oz (82.6 kg). Her oral temperature is 97.7 °F (36.5 °C). Her blood pressure is 136/82 and her pulse is 59. Her respiration is 16 and oxygen saturation is 99%. AAO/NAD, appropriate affect for mood       Diagnosis Orders   1. Hypopigmentation     2. Keratotic lesion     3. Ganglion cyst of foot         Plan:  Send to plastics for lesion on the left nare. Refer to podiatry for ganglion cyst of right 2nd toe. See what plastics says about the hopopigmented area on the bulb of the nose. Health Maintenance Due   Topic Date Due    DTaP/Tdap/Td vaccine (1 - Tdap) Never done       Attending Physician Statement  I have discussed the case, including pertinent history and exam findings with the resident. I also have seen the patient and performed key portions of the examination. I agree with the documented assessment and plan as documented by the resident.         Chiara Gates DO 7/9/2021 12:24 PM

## 2021-07-09 NOTE — PROGRESS NOTES
[address] [facility] [address] [address]  Dept: [phone]  Loc: Kalpesh Agustin (:  1955) is a 78 y.o. female,Established patient, here for evaluation of the following chief complaint(s):  Skin Lesion (end of nose and would like a referal to dermatology)      ASSESSMENT/PLAN:  1. Keratotic lesion  -    Chronic non improving keratotic lesion on nose, left of midline   -  Lashell Staples MD, Plastic Surgery, [facility]  2. Benign skin lesion of nose  -    Acute benign hypopigmented lesion on tip of nose   - Julian Smith MD, Plastic Surgery, [facility]  3. Ganglion cyst of foot  -    Chronic worsening ganglion cyst on plantar surface of right second phalange of foot. - Measuring 2cm x 2 cm, malleable  - AFL - Elbert Shore DPM, Podiatry, [facility]  -     Comprehensive Metabolic Panel; Future  -     CBC With Auto Differential; Future  4. Intestinal malabsorption, unspecified type  -     Vitamin D 25 Hydroxy; Future  -     Magnesium; Future  5. Other emphysema ([address]  Assessment & Plan:   Well-controlled, continue current medications  6. Primary malignant neoplasm of breast without evidence of distant metastasis, unspecified laterality ([address]  7. Screening examination for pulmonary tuberculosis  -     Mantoux testing  8. Encounter for PPD test   - PPD test for volunteer possision      Return in about 3 months (around 10/9/2021). SUBJECTIVE/OBJECTIVE:  HPI  Chronic keratotic lesion on lose left of midline, 2mm in diameter protruding from nose 1 to 2mm. Chronic lesion she's had for years. She has scratched it off and it continues to return, mother had similar presentation. Last summer noticed hypopigmented lesion on tip of nose without improvement. No ulcerations but states it has had a red central coloration. Right second foot phalange mass.  Noticed after tripping, possibly fracturing toe 30 years ago. Was initially fluid like mass that has hardened over the years. Resulting in abrasions on top of right secont toe due to rubbing on shoes making it uncomfortable and difficult to walk. Patient would like removed. Review of Systems   Constitutional: Negative for chills, fatigue and fever. HENT: Negative for ear pain, postnasal drip and trouble swallowing. Eyes: Negative for pain and visual disturbance. Respiratory: Negative for cough and shortness of breath. Cardiovascular: Negative for chest pain and palpitations. Gastrointestinal: Negative for abdominal pain, diarrhea, nausea and vomiting. Genitourinary: Negative for dysuria. Musculoskeletal: Positive for back pain (Patient attributes to abnormal gait due to cyst on toe ). Skin: Negative for rash. Neurological: Negative for headaches. Physical Exam  Vitals and nursing note reviewed. Constitutional:       General: She is not in acute distress. Appearance: She is well-developed. She is not diaphoretic. HENT:      Head: Normocephalic and atraumatic. Right Ear: External ear normal.      Left Ear: External ear normal.      Nose: Nose normal.   Eyes:      Conjunctiva/sclera: Conjunctivae normal.   Cardiovascular:      Rate and Rhythm: Normal rate and regular rhythm. Pulses: Normal pulses. Heart sounds: Normal heart sounds. Pulmonary:      Effort: Pulmonary effort is normal.      Breath sounds: Normal breath sounds. Musculoskeletal:      Cervical back: Normal range of motion. Feet:    Skin:     General: Skin is warm and dry. Findings: Lesion (Mild hypopigmentation of tip of nose with mild telangetasias on inferior border without raised borders, no ulcerations. Red keratotic lesion on tip of nose left of midline. Ganglion cyst on right second toe.  ) present. No erythema or rash. Neurological:      Mental Status: She is alert and oriented to person, place, and time. Cranial Nerves:  No cranial nerve deficit. Psychiatric:         Behavior: Behavior normal.         Thought Content: Thought content normal.         Judgment: Judgment normal.           Vitals:    07/09/21 1106   BP: 136/82   Site: Right Upper Arm   Position: Sitting   Cuff Size: Medium Adult   Pulse: 59   Resp: 16   Temp: 97.7 °F (36.5 °C)   TempSrc: Oral   SpO2: 99%   Weight: 182 lb 3.2 oz (82.6 kg)   Height: 5' 3\" (1.6 m)         An electronic signature was used to authenticate this note.     --Brett Craig MD

## 2021-07-10 ASSESSMENT — ENCOUNTER SYMPTOMS
DIARRHEA: 0
EYE PAIN: 0
TROUBLE SWALLOWING: 0
VOMITING: 0
NAUSEA: 0
BACK PAIN: 1
COUGH: 0
ABDOMINAL PAIN: 0
SHORTNESS OF BREATH: 0

## 2021-07-16 ENCOUNTER — NURSE ONLY (OUTPATIENT)
Dept: LAB | Age: 66
End: 2021-07-16

## 2021-07-16 ENCOUNTER — OFFICE VISIT (OUTPATIENT)
Dept: SURGERY | Age: 66
End: 2021-07-16
Payer: MEDICARE

## 2021-07-16 VITALS
BODY MASS INDEX: 31.71 KG/M2 | TEMPERATURE: 97 F | HEIGHT: 63 IN | HEART RATE: 65 BPM | DIASTOLIC BLOOD PRESSURE: 84 MMHG | WEIGHT: 179 LBS | SYSTOLIC BLOOD PRESSURE: 129 MMHG

## 2021-07-16 DIAGNOSIS — M67.479 GANGLION CYST OF FOOT: ICD-10-CM

## 2021-07-16 DIAGNOSIS — D48.5 NEOPLASM OF UNCERTAIN BEHAVIOR OF SKIN: Primary | ICD-10-CM

## 2021-07-16 DIAGNOSIS — K90.9 INTESTINAL MALABSORPTION, UNSPECIFIED TYPE: ICD-10-CM

## 2021-07-16 LAB
ALBUMIN SERPL-MCNC: 4.2 G/DL (ref 3.5–5.1)
ALP BLD-CCNC: 90 U/L (ref 38–126)
ALT SERPL-CCNC: 14 U/L (ref 11–66)
ANION GAP SERPL CALCULATED.3IONS-SCNC: 8 MEQ/L (ref 8–16)
AST SERPL-CCNC: 18 U/L (ref 5–40)
BASOPHILS # BLD: 1.7 %
BASOPHILS ABSOLUTE: 0.1 THOU/MM3 (ref 0–0.1)
BILIRUB SERPL-MCNC: 0.7 MG/DL (ref 0.3–1.2)
BUN BLDV-MCNC: 11 MG/DL (ref 7–22)
CALCIUM SERPL-MCNC: 9.3 MG/DL (ref 8.5–10.5)
CHLORIDE BLD-SCNC: 101 MEQ/L (ref 98–111)
CO2: 30 MEQ/L (ref 23–33)
CREAT SERPL-MCNC: 0.7 MG/DL (ref 0.4–1.2)
EOSINOPHIL # BLD: 3 %
EOSINOPHILS ABSOLUTE: 0.2 THOU/MM3 (ref 0–0.4)
ERYTHROCYTE [DISTWIDTH] IN BLOOD BY AUTOMATED COUNT: 14.4 % (ref 11.5–14.5)
ERYTHROCYTE [DISTWIDTH] IN BLOOD BY AUTOMATED COUNT: 48 FL (ref 35–45)
GFR SERPL CREATININE-BSD FRML MDRD: 83 ML/MIN/1.73M2
GLUCOSE BLD-MCNC: 79 MG/DL (ref 70–108)
HCT VFR BLD CALC: 43.9 % (ref 37–47)
HEMOGLOBIN: 14.1 GM/DL (ref 12–16)
IMMATURE GRANS (ABS): 0.01 THOU/MM3 (ref 0–0.07)
IMMATURE GRANULOCYTES: 0.2 %
LYMPHOCYTES # BLD: 35.9 %
LYMPHOCYTES ABSOLUTE: 2.4 THOU/MM3 (ref 1–4.8)
MAGNESIUM: 2.2 MG/DL (ref 1.6–2.4)
MCH RBC QN AUTO: 29.3 PG (ref 26–33)
MCHC RBC AUTO-ENTMCNC: 32.1 GM/DL (ref 32.2–35.5)
MCV RBC AUTO: 91.3 FL (ref 81–99)
MONOCYTES # BLD: 10.5 %
MONOCYTES ABSOLUTE: 0.7 THOU/MM3 (ref 0.4–1.3)
NUCLEATED RED BLOOD CELLS: 0 /100 WBC
PLATELET # BLD: 279 THOU/MM3 (ref 130–400)
PMV BLD AUTO: 10.2 FL (ref 9.4–12.4)
POTASSIUM SERPL-SCNC: 3.8 MEQ/L (ref 3.5–5.2)
RBC # BLD: 4.81 MILL/MM3 (ref 4.2–5.4)
SEG NEUTROPHILS: 48.7 %
SEGMENTED NEUTROPHILS ABSOLUTE COUNT: 3.2 THOU/MM3 (ref 1.8–7.7)
SODIUM BLD-SCNC: 139 MEQ/L (ref 135–145)
TOTAL PROTEIN: 7.4 G/DL (ref 6.1–8)
VITAMIN D 25-HYDROXY: 29 NG/ML (ref 30–100)
WBC # BLD: 6.6 THOU/MM3 (ref 4.8–10.8)

## 2021-07-16 PROCEDURE — 11102 TANGNTL BX SKIN SINGLE LES: CPT | Performed by: SURGERY

## 2021-07-16 PROCEDURE — 99204 OFFICE O/P NEW MOD 45 MIN: CPT | Performed by: SURGERY

## 2021-07-16 RX ORDER — ASPIRIN 81 MG/1
81 TABLET ORAL DAILY
COMMUNITY

## 2021-07-16 RX ORDER — LIDOCAINE HYDROCHLORIDE AND EPINEPHRINE BITARTRATE 20; .01 MG/ML; MG/ML
0.5 INJECTION, SOLUTION SUBCUTANEOUS ONCE
Status: COMPLETED | OUTPATIENT
Start: 2021-07-16 | End: 2021-07-16

## 2021-07-16 RX ADMIN — LIDOCAINE HYDROCHLORIDE AND EPINEPHRINE BITARTRATE 0.5 ML: 20; .01 INJECTION, SOLUTION SUBCUTANEOUS at 11:21

## 2021-07-16 ASSESSMENT — ENCOUNTER SYMPTOMS
PHOTOPHOBIA: 0
TROUBLE SWALLOWING: 0
SINUS PRESSURE: 0
FACIAL SWELLING: 0
SINUS PAIN: 0
COUGH: 0
ABDOMINAL PAIN: 0
COLOR CHANGE: 1
SHORTNESS OF BREATH: 0

## 2021-07-16 ASSESSMENT — VISUAL ACUITY: OU: 1

## 2021-07-16 NOTE — PROGRESS NOTES
[facility] [place] [facility]  [facility] PLASTIC SURGERY  [address]  [address]. [facility] [address]  Dept: [phone]  Dept Fax: [phone]: [phone]     Visit Date:  7/16/2021    Patient:  Abdifatah Dyson  YOB: 1955    HPI:   Abdifatah Dyson presents today for No chief complaint on file. Alma Andersen HPI    Medications    Current Outpatient Medications:     aspirin 81 MG EC tablet, Take 81 mg by mouth daily, Disp: , Rfl:     hydroCHLOROthiazide (HYDRODIURIL) 25 MG tablet, Take 1 tablet by mouth every morning, Disp: 90 tablet, Rfl: 3    losartan (COZAAR) 50 MG tablet, Take 1 tablet by mouth daily, Disp: 90 tablet, Rfl: 3    fluticasone (FLOVENT HFA) 110 MCG/ACT inhaler, Inhale 2 puffs into the lungs 2 times daily (Patient not taking: Reported on 3/22/2021), Disp: 1 Inhaler, Rfl: 3    DHEA 50 MG TABS, Take 50 mg by mouth once, Disp: , Rfl:     albuterol sulfate HFA (VENTOLIN HFA) 108 (90 Base) MCG/ACT inhaler, Inhale 2 puffs into the lungs 4 times daily as needed for Wheezing (Patient not taking: Reported on 3/22/2021), Disp: 1 Inhaler, Rfl: 0    MAGNESIUM PO, Take 400 mg by mouth daily, Disp: , Rfl:     Lactobacillus (PROBIOTIC ACIDOPHILUS PO), Take by mouth daily , Disp: , Rfl:     CINNAMON PO, Take by mouth daily 2000mg, Disp: , Rfl:     vitamin E 400 UNIT capsule, Take 400 Units by mouth daily , Disp: , Rfl:     b complex vitamins capsule, Take 1 capsule by mouth daily , Disp: , Rfl:     Cholecalciferol (VITAMIN D3) 25 MCG (1000 UT) CAPS, Take 1 capsule by mouth daily , Disp: , Rfl:     Multiple Minerals-Vitamins (LAITH-MAG-ZINC-D PO), Take 1 tablet by mouth daily , Disp: , Rfl:     Multiple Vitamins-Minerals (HAIR/SKIN/NAILS/BIOTIN PO), Take 1 tablet by mouth daily , Disp: , Rfl:     Allergies:  is allergic to seasonal.     Past Medical History:   has a past medical history of Cancer ([facility] [address]     Past Surgical History   has a past surgical history that includes Appendectomy; Breast lumpectomy ();  section; [place] tooth extraction; and Tubal ligation. Family History  family history includes Cancer in her sister and sister; Diabetes in her brother; Heart Attack in her brother; Heart Disease in her father and mother; High Blood Pressure in her brother, brother, sister, sister, and sister; Obesity in her brother; Substance Abuse in her sister. Social History   reports that she has never smoked. She has never used smokeless tobacco. She reports current alcohol use. She reports that she does not use drugs. Health Maintenance:    Health Maintenance   Topic Date Due    DTaP/Tdap/Td vaccine (1 - Tdap) Never done    Flu vaccine (1) 2021    Pneumococcal 65+ years Vaccine (2 of 2 - PPSV23) 2021    Potassium monitoring  2021    Creatinine monitoring  2021    Annual Wellness Visit (AWV)  2022    Breast cancer screen  09/15/2022    Diabetes screen  2024    Lipid screen  2025    Colon cancer screen colonoscopy  2028    DEXA (modify frequency per FRAX score)  Completed    Shingles Vaccine  Completed    COVID-19 Vaccine  Completed    Hepatitis C screen  Completed    Hepatitis A vaccine  Aged Out    Hepatitis B vaccine  Aged Out    Hib vaccine  Aged Out    Meningococcal (ACWY) vaccine  Aged Out       Subjective:      Review of Systems    Objective:     LMP 1999     Physical Exam  Derm Physical Exam       Assessment/Plan:      There were no encounter diagnoses. No orders of the defined types were placed in this encounter. No follow-ups on file. Patient given educational materials - see patient instructions. Discussed use, benefit, and side effects of prescribed medications. All patient questions answered. Pt voiced understanding. Reviewed health maintenance.        Electronically signed Shashank Min RN on 2021 at 11:22 AM EDT

## 2021-07-16 NOTE — PROGRESS NOTES
[facility] [place] [facility]  [facility] PLASTIC SURGERY  [address]  [address] [facility] [address]  Dept: [phone]  Dept Fax: [phone]: [phone]     Visit Date:  7/16/2021    Patient:  Aleyda Burnham  YOB: 1955    HPI:   Aleyda Burnham presents today for   Chief Complaint   Patient presents with   Nancy Stabs Consultation     lesion on nose   . Bea Brito is a 67yo female who is referred to my office for evaluation of an area of the left nasal ala which the patient describes as a recurrent sore that occasionally will bleed and never fully heals. She states that it is smaller than the usual size but this a pattern where it will increase and decrease in size over time. She states that this has been recurring over the past 15 years. She has never had the area biopsied. She has no history of skin cancer on the face.       Medications    Current Outpatient Medications:     aspirin 81 MG EC tablet, Take 81 mg by mouth daily, Disp: , Rfl:     hydroCHLOROthiazide (HYDRODIURIL) 25 MG tablet, Take 1 tablet by mouth every morning, Disp: 90 tablet, Rfl: 3    losartan (COZAAR) 50 MG tablet, Take 1 tablet by mouth daily, Disp: 90 tablet, Rfl: 3    DHEA 50 MG TABS, Take 50 mg by mouth once, Disp: , Rfl:     MAGNESIUM PO, Take 400 mg by mouth daily, Disp: , Rfl:     Lactobacillus (PROBIOTIC ACIDOPHILUS PO), Take by mouth daily , Disp: , Rfl:     CINNAMON PO, Take by mouth daily 2000mg, Disp: , Rfl:     vitamin E 400 UNIT capsule, Take 400 Units by mouth daily , Disp: , Rfl:     b complex vitamins capsule, Take 1 capsule by mouth daily , Disp: , Rfl:     Cholecalciferol (VITAMIN D3) 25 MCG (1000 UT) CAPS, Take 1 capsule by mouth daily , Disp: , Rfl:     Multiple Minerals-Vitamins (LAITH-MAG-ZINC-D PO), Take 1 tablet by mouth daily , Disp: , Rfl:     Multiple Vitamins-Minerals (HAIR/SKIN/NAILS/BIOTIN PO), Take 1 tablet by mouth daily , Disp: , Rfl:     fluticasone (FLOVENT HFA) 110 MCG/ACT inhaler, Inhale 2 puffs into the lungs 2 times daily (Patient not taking: Reported on 3/22/2021), Disp: 1 Inhaler, Rfl: 3    albuterol sulfate HFA (VENTOLIN HFA) 108 (90 Base) MCG/ACT inhaler, Inhale 2 puffs into the lungs 4 times daily as needed for Wheezing (Patient not taking: Reported on 3/22/2021), Disp: 1 Inhaler, Rfl: 0    Allergies:  is allergic to seasonal.     Past Medical History:   has a past medical history of Cancer ([facility] [address] Past Surgical History   has a past surgical history that includes Appendectomy; Breast lumpectomy ();  section; [place] tooth extraction; and Tubal ligation. Family History  family history includes Cancer in her sister and sister; Diabetes in her brother; Heart Attack in her brother; Heart Disease in her father and mother; High Blood Pressure in her brother, brother, sister, sister, and sister; Obesity in her brother; Substance Abuse in her sister. Social History   reports that she has never smoked. She has never used smokeless tobacco. She reports current alcohol use. She reports that she does not use drugs.     Health Maintenance:    Health Maintenance   Topic Date Due    DTaP/Tdap/Td vaccine (1 - Tdap) Never done    Flu vaccine (1) 2021    Pneumococcal 65+ years Vaccine (2 of 2 - PPSV23) 2021    Potassium monitoring  2021    Creatinine monitoring  2021    Annual Wellness Visit (AWV)  2022    Breast cancer screen  09/15/2022    Diabetes screen  2024    Lipid screen  2025    Colon cancer screen colonoscopy  2028    DEXA (modify frequency per FRAX score)  Completed    Shingles Vaccine  Completed    COVID-19 Vaccine  Completed    Hepatitis C screen  Completed    Hepatitis A vaccine  Aged Out    Hepatitis B vaccine  Aged Out    Hib vaccine  Aged Out    Meningococcal (ACWY) vaccine  Aged Out       Subjective:      Review of Systems   Constitutional: Negative for activity change, appetite change and unexpected weight change. HENT: Negative for dental problem, facial swelling, nosebleeds, sinus pressure, sinus pain and trouble swallowing. Eyes: Negative for photophobia and visual disturbance. Respiratory: Negative for cough and shortness of breath. Cardiovascular: Negative for chest pain and leg swelling. Gastrointestinal: Negative for abdominal pain. Endocrine: Negative for cold intolerance and heat intolerance. Musculoskeletal: Negative for neck pain and neck stiffness. Skin: Positive for color change. Negative for pallor, rash and wound. Neurological: Negative for dizziness, facial asymmetry, light-headedness, numbness and headaches. Hematological: Does not bruise/bleed easily. Objective:     /84   Pulse 65   Temp 97 °F (36.1 °C)   Ht 5' 3\" (1.6 m)   Wt 179 lb (81.2 kg)   LMP 01/01/1999   BMI 31.71 kg/m²     Physical Exam  Vitals and nursing note reviewed. Constitutional:       General: She is not in acute distress. Appearance: Normal appearance. She is well-developed and well-groomed. HENT:      Head: Normocephalic and atraumatic. Jaw: There is normal jaw occlusion. Right Ear: Hearing and external ear normal.      Left Ear: Hearing and external ear normal.      Nose: Nose normal.        Mouth/Throat:      Lips: Pink. Mouth: Mucous membranes are moist.      Pharynx: Oropharynx is clear. Eyes:      General: Lids are normal. Vision grossly intact. Right eye: No discharge. Left eye: No discharge. Extraocular Movements: Extraocular movements intact. Conjunctiva/sclera: Conjunctivae normal.      Pupils: Pupils are equal, round, and reactive to light. Neck:      Thyroid: No thyromegaly. Trachea: Trachea and phonation normal. No tracheal deviation. Cardiovascular:      Rate and Rhythm: Normal rate. Pulses: Normal pulses. No decreased pulses.    Pulmonary:      Effort: Pulmonary effort is normal. No respiratory distress. Abdominal:      General: There is no distension. Palpations: Abdomen is soft. Tenderness: There is no abdominal tenderness. Musculoskeletal:         General: No deformity. Normal range of motion. Cervical back: Full passive range of motion without pain, normal range of motion and neck supple. Skin:     General: Skin is warm and dry. Capillary Refill: Capillary refill takes less than 2 seconds. Findings: No erythema or rash. Neurological:      General: No focal deficit present. Mental Status: She is alert and oriented to person, place, and time. Mental status is at baseline. Sensory: No sensory deficit. Psychiatric:         Mood and Affect: Mood normal.         Behavior: Behavior normal. Behavior is cooperative. Thought Content: Thought content normal.         Judgment: Judgment normal.       Physical Exam   EENT              Assessment/Plan:   Assessment: Neoplasm of uncertain behavior of the skin of the nose    Plan: shave biopsy under local anesthesia      Procedure: shave biopsy  After informed consent was obtained, the area of planned shave biopsy was identified. The area was prepped and draped in the usual aseptic manner and local infiltration of a mixture of 1% lidocaine with epinephrine was performed. Once adequate anesthesia effect and epinephrine effect had occurred, a 10 blade scalpel was used to perform a shave biopsy of the lesion in question. The lesion was placed in formalin and sent to pathology for permanent section. The wound was then cauterized and a dressing was applied. The patient tolerated the procedure well. The encounter diagnosis was Neoplasm of uncertain behavior of skin.   Orders Placed This Encounter   Procedures    Surgical Pathology     nose     Standing Status:   Future     Standing Expiration Date:   7/16/2022     Order Specific Question:   PREVIOUS BIOPSY     Answer:   No     Order

## 2021-07-19 ENCOUNTER — TELEPHONE (OUTPATIENT)
Dept: FAMILY MEDICINE CLINIC | Age: 66
End: 2021-07-19

## 2021-07-19 NOTE — TELEPHONE ENCOUNTER
----- Message from Kristopher Dawson DO sent at 7/19/2021  7:09 AM EDT -----  It looks like you need to double the fish oil - increase the vitamin D by [id number] a day, drink more water and you could stand to increase the magnesium by a pill - or can focus on magnesium rich foods  Thanks for getting the labs!

## 2021-07-19 NOTE — TELEPHONE ENCOUNTER
----- Message from Sera Oneill MD sent at 7/19/2021  9:41 AM EDT -----  Thank you and noted.   Dr. Cipriano Cary

## 2021-07-26 ENCOUNTER — TELEPHONE (OUTPATIENT)
Dept: SURGERY | Age: 66
End: 2021-07-26

## 2021-07-26 ENCOUNTER — TELEPHONE (OUTPATIENT)
Dept: FAMILY MEDICINE CLINIC | Age: 66
End: 2021-07-26

## 2021-07-26 NOTE — TELEPHONE ENCOUNTER
Patient calling in stating foot surgeon is sending over clearance form. Will patient need any testing for this clearance. Has recent lab work and ekg was done in march. Please advise.

## 2021-07-27 RX ORDER — MULTIVIT WITH MINERALS/LUTEIN
250 TABLET ORAL DAILY
COMMUNITY
End: 2022-03-31 | Stop reason: ALTCHOICE

## 2021-07-27 NOTE — TELEPHONE ENCOUNTER
I would be happy to fill out the clearance form. As soon as I receive it I will start working on it. Her recent labs from 07/16/2021 will work just fine.     Thank you,  -Dr. DELGADO-[facility]-ER

## 2021-07-27 NOTE — PROGRESS NOTES
NPO after midnight except for sip of water with heart/BP meds  Follow instructions given by surgeon including medications to hold   Bring insurance card and photo ID  Shower morning of surgery with liquid antibacterial soap  Wear loose comfortable clothing  Remove jewelry and do not bring valuables  Bring list of medications with dosages and how often taken if not reviewed with PAT    needed at discharge at [facility] 24years old  Call PAT at [phone] for questions

## 2021-08-02 ENCOUNTER — TELEPHONE (OUTPATIENT)
Dept: FAMILY MEDICINE CLINIC | Age: 66
End: 2021-08-02

## 2021-08-02 ENCOUNTER — ANESTHESIA (OUTPATIENT)
Dept: OPERATING ROOM | Age: 66
End: 2021-08-02
Payer: MEDICARE

## 2021-08-02 ENCOUNTER — HOSPITAL ENCOUNTER (OUTPATIENT)
Age: 66
Setting detail: OUTPATIENT SURGERY
Discharge: HOME | End: 2021-08-02
Attending: STUDENT IN AN ORGANIZED HEALTH CARE EDUCATION/TRAINING PROGRAM | Admitting: STUDENT IN AN ORGANIZED HEALTH CARE EDUCATION/TRAINING PROGRAM
Payer: MEDICARE

## 2021-08-02 ENCOUNTER — ANESTHESIA EVENT (OUTPATIENT)
Dept: OPERATING ROOM | Age: 66
End: 2021-08-02
Payer: MEDICARE

## 2021-08-02 VITALS
DIASTOLIC BLOOD PRESSURE: 74 MMHG | HEART RATE: 70 BPM | OXYGEN SATURATION: 95 % | WEIGHT: 177.4 LBS | HEIGHT: 63 IN | SYSTOLIC BLOOD PRESSURE: 121 MMHG | BODY MASS INDEX: 31.43 KG/M2 | RESPIRATION RATE: 16 BRPM | TEMPERATURE: 97.9 F

## 2021-08-02 VITALS
SYSTOLIC BLOOD PRESSURE: 128 MMHG | OXYGEN SATURATION: 96 % | DIASTOLIC BLOOD PRESSURE: 72 MMHG | RESPIRATION RATE: 14 BRPM

## 2021-08-02 DIAGNOSIS — G89.18 POST-OP PAIN: Primary | ICD-10-CM

## 2021-08-02 PROCEDURE — 2580000003 HC RX 258

## 2021-08-02 PROCEDURE — 88307 TISSUE EXAM BY PATHOLOGIST: CPT

## 2021-08-02 PROCEDURE — 7100000010 HC PHASE II RECOVERY - FIRST 15 MIN: Performed by: STUDENT IN AN ORGANIZED HEALTH CARE EDUCATION/TRAINING PROGRAM

## 2021-08-02 PROCEDURE — 3600000002 HC SURGERY LEVEL 2 BASE: Performed by: STUDENT IN AN ORGANIZED HEALTH CARE EDUCATION/TRAINING PROGRAM

## 2021-08-02 PROCEDURE — 2500000003 HC RX 250 WO HCPCS: Performed by: STUDENT IN AN ORGANIZED HEALTH CARE EDUCATION/TRAINING PROGRAM

## 2021-08-02 PROCEDURE — 2500000003 HC RX 250 WO HCPCS: Performed by: REGISTERED NURSE

## 2021-08-02 PROCEDURE — 3600000012 HC SURGERY LEVEL 2 ADDTL 15MIN: Performed by: STUDENT IN AN ORGANIZED HEALTH CARE EDUCATION/TRAINING PROGRAM

## 2021-08-02 PROCEDURE — 6360000002 HC RX W HCPCS

## 2021-08-02 PROCEDURE — 3700000001 HC ADD 15 MINUTES (ANESTHESIA): Performed by: STUDENT IN AN ORGANIZED HEALTH CARE EDUCATION/TRAINING PROGRAM

## 2021-08-02 PROCEDURE — 6360000002 HC RX W HCPCS: Performed by: REGISTERED NURSE

## 2021-08-02 PROCEDURE — 7100000011 HC PHASE II RECOVERY - ADDTL 15 MIN: Performed by: STUDENT IN AN ORGANIZED HEALTH CARE EDUCATION/TRAINING PROGRAM

## 2021-08-02 PROCEDURE — 3700000000 HC ANESTHESIA ATTENDED CARE: Performed by: STUDENT IN AN ORGANIZED HEALTH CARE EDUCATION/TRAINING PROGRAM

## 2021-08-02 RX ORDER — MEPERIDINE HYDROCHLORIDE 25 MG/ML
12.5 INJECTION INTRAMUSCULAR; INTRAVENOUS; SUBCUTANEOUS EVERY 5 MIN PRN
Status: DISCONTINUED | OUTPATIENT
Start: 2021-08-02 | End: 2021-08-02 | Stop reason: HOSPADM

## 2021-08-02 RX ORDER — KETOROLAC TROMETHAMINE 30 MG/ML
INJECTION, SOLUTION INTRAMUSCULAR; INTRAVENOUS PRN
Status: DISCONTINUED | OUTPATIENT
Start: 2021-08-02 | End: 2021-08-02 | Stop reason: SDUPTHER

## 2021-08-02 RX ORDER — SODIUM CHLORIDE 9 MG/ML
25 INJECTION, SOLUTION INTRAVENOUS PRN
Status: DISCONTINUED | OUTPATIENT
Start: 2021-08-02 | End: 2021-08-02 | Stop reason: HOSPADM

## 2021-08-02 RX ORDER — FENTANYL CITRATE 50 UG/ML
25 INJECTION, SOLUTION INTRAMUSCULAR; INTRAVENOUS EVERY 5 MIN PRN
Status: DISCONTINUED | OUTPATIENT
Start: 2021-08-02 | End: 2021-08-02 | Stop reason: HOSPADM

## 2021-08-02 RX ORDER — FENTANYL CITRATE 50 UG/ML
INJECTION, SOLUTION INTRAMUSCULAR; INTRAVENOUS PRN
Status: DISCONTINUED | OUTPATIENT
Start: 2021-08-02 | End: 2021-08-02 | Stop reason: SDUPTHER

## 2021-08-02 RX ORDER — OXYCODONE HYDROCHLORIDE AND ACETAMINOPHEN 5; 325 MG/1; MG/1
1 TABLET ORAL EVERY 6 HOURS PRN
Qty: 20 TABLET | Refills: 0 | Status: SHIPPED | OUTPATIENT
Start: 2021-08-02 | End: 2021-08-07

## 2021-08-02 RX ORDER — SENNA AND DOCUSATE SODIUM 50; 8.6 MG/1; MG/1
1 TABLET, FILM COATED ORAL 2 TIMES DAILY
Status: DISCONTINUED | OUTPATIENT
Start: 2021-08-02 | End: 2021-08-02 | Stop reason: HOSPADM

## 2021-08-02 RX ORDER — SODIUM CHLORIDE 0.9 % (FLUSH) 0.9 %
10 SYRINGE (ML) INJECTION EVERY 12 HOURS SCHEDULED
Status: DISCONTINUED | OUTPATIENT
Start: 2021-08-02 | End: 2021-08-02 | Stop reason: HOSPADM

## 2021-08-02 RX ORDER — ONDANSETRON 2 MG/ML
4 INJECTION INTRAMUSCULAR; INTRAVENOUS EVERY 6 HOURS PRN
Status: DISCONTINUED | OUTPATIENT
Start: 2021-08-02 | End: 2021-08-02 | Stop reason: HOSPADM

## 2021-08-02 RX ORDER — LIDOCAINE HYDROCHLORIDE 20 MG/ML
INJECTION, SOLUTION EPIDURAL; INFILTRATION; INTRACAUDAL; PERINEURAL PRN
Status: DISCONTINUED | OUTPATIENT
Start: 2021-08-02 | End: 2021-08-02 | Stop reason: SDUPTHER

## 2021-08-02 RX ORDER — FENTANYL CITRATE 50 UG/ML
50 INJECTION, SOLUTION INTRAMUSCULAR; INTRAVENOUS EVERY 5 MIN PRN
Status: DISCONTINUED | OUTPATIENT
Start: 2021-08-02 | End: 2021-08-02 | Stop reason: HOSPADM

## 2021-08-02 RX ORDER — SODIUM CHLORIDE 9 MG/ML
INJECTION, SOLUTION INTRAVENOUS CONTINUOUS
Status: DISCONTINUED | OUTPATIENT
Start: 2021-08-02 | End: 2021-08-02 | Stop reason: HOSPADM

## 2021-08-02 RX ORDER — PROMETHAZINE HYDROCHLORIDE 25 MG/ML
25 INJECTION, SOLUTION INTRAMUSCULAR; INTRAVENOUS
Status: DISCONTINUED | OUTPATIENT
Start: 2021-08-02 | End: 2021-08-02 | Stop reason: HOSPADM

## 2021-08-02 RX ORDER — PROPOFOL 10 MG/ML
INJECTION, EMULSION INTRAVENOUS PRN
Status: DISCONTINUED | OUTPATIENT
Start: 2021-08-02 | End: 2021-08-02 | Stop reason: SDUPTHER

## 2021-08-02 RX ORDER — DEXAMETHASONE SODIUM PHOSPHATE 10 MG/ML
INJECTION, EMULSION INTRAMUSCULAR; INTRAVENOUS PRN
Status: DISCONTINUED | OUTPATIENT
Start: 2021-08-02 | End: 2021-08-02 | Stop reason: SDUPTHER

## 2021-08-02 RX ORDER — SODIUM CHLORIDE 0.9 % (FLUSH) 0.9 %
10 SYRINGE (ML) INJECTION PRN
Status: DISCONTINUED | OUTPATIENT
Start: 2021-08-02 | End: 2021-08-02 | Stop reason: HOSPADM

## 2021-08-02 RX ORDER — BUPIVACAINE HYDROCHLORIDE 5 MG/ML
INJECTION, SOLUTION PERINEURAL PRN
Status: DISCONTINUED | OUTPATIENT
Start: 2021-08-02 | End: 2021-08-02 | Stop reason: ALTCHOICE

## 2021-08-02 RX ORDER — SODIUM CHLORIDE 0.9 % (FLUSH) 0.9 %
5-40 SYRINGE (ML) INJECTION EVERY 12 HOURS SCHEDULED
Status: DISCONTINUED | OUTPATIENT
Start: 2021-08-02 | End: 2021-08-02 | Stop reason: HOSPADM

## 2021-08-02 RX ORDER — DIPHENHYDRAMINE HYDROCHLORIDE 50 MG/ML
12.5 INJECTION INTRAMUSCULAR; INTRAVENOUS
Status: DISCONTINUED | OUTPATIENT
Start: 2021-08-02 | End: 2021-08-02 | Stop reason: HOSPADM

## 2021-08-02 RX ORDER — SODIUM CHLORIDE 0.9 % (FLUSH) 0.9 %
5-40 SYRINGE (ML) INJECTION PRN
Status: DISCONTINUED | OUTPATIENT
Start: 2021-08-02 | End: 2021-08-02 | Stop reason: HOSPADM

## 2021-08-02 RX ORDER — ONDANSETRON 4 MG/1
4 TABLET, ORALLY DISINTEGRATING ORAL EVERY 8 HOURS PRN
Status: DISCONTINUED | OUTPATIENT
Start: 2021-08-02 | End: 2021-08-02 | Stop reason: HOSPADM

## 2021-08-02 RX ORDER — OXYCODONE HYDROCHLORIDE 5 MG/1
5 TABLET ORAL EVERY 4 HOURS PRN
Status: DISCONTINUED | OUTPATIENT
Start: 2021-08-02 | End: 2021-08-02 | Stop reason: HOSPADM

## 2021-08-02 RX ORDER — ONDANSETRON 2 MG/ML
INJECTION INTRAMUSCULAR; INTRAVENOUS PRN
Status: DISCONTINUED | OUTPATIENT
Start: 2021-08-02 | End: 2021-08-02 | Stop reason: SDUPTHER

## 2021-08-02 RX ADMIN — DEXAMETHASONE SODIUM PHOSPHATE 10 MG: 10 INJECTION, EMULSION INTRAMUSCULAR; INTRAVENOUS at 14:16

## 2021-08-02 RX ADMIN — CEFAZOLIN SODIUM 2000 MG: 10 INJECTION, POWDER, FOR SOLUTION INTRAVENOUS at 14:19

## 2021-08-02 RX ADMIN — FENTANYL CITRATE 100 MCG: 50 INJECTION, SOLUTION INTRAMUSCULAR; INTRAVENOUS at 14:16

## 2021-08-02 RX ADMIN — LIDOCAINE HYDROCHLORIDE 100 MG: 20 INJECTION, SOLUTION EPIDURAL; INFILTRATION; INTRACAUDAL; PERINEURAL at 14:16

## 2021-08-02 RX ADMIN — SODIUM CHLORIDE: 9 INJECTION, SOLUTION INTRAVENOUS at 14:12

## 2021-08-02 RX ADMIN — KETOROLAC TROMETHAMINE 30 MG: 30 INJECTION, SOLUTION INTRAMUSCULAR at 14:16

## 2021-08-02 RX ADMIN — ONDANSETRON HYDROCHLORIDE 4 MG: 4 INJECTION, SOLUTION INTRAMUSCULAR; INTRAVENOUS at 14:16

## 2021-08-02 RX ADMIN — PROPOFOL 80 MCG/KG/MIN: 10 INJECTION, EMULSION INTRAVENOUS at 14:16

## 2021-08-02 ASSESSMENT — PULMONARY FUNCTION TESTS
PIF_VALUE: 12
PIF_VALUE: 13
PIF_VALUE: 12
PIF_VALUE: 12
PIF_VALUE: 13
PIF_VALUE: 34
PIF_VALUE: 1
PIF_VALUE: 12
PIF_VALUE: 12
PIF_VALUE: 0
PIF_VALUE: 12
PIF_VALUE: 12
PIF_VALUE: 34
PIF_VALUE: 1
PIF_VALUE: 13
PIF_VALUE: 13
PIF_VALUE: 12
PIF_VALUE: 13
PIF_VALUE: 0
PIF_VALUE: 13
PIF_VALUE: 12
PIF_VALUE: 13
PIF_VALUE: 13

## 2021-08-02 ASSESSMENT — PAIN SCALES - GENERAL: PAINLEVEL_OUTOF10: 0

## 2021-08-02 ASSESSMENT — LIFESTYLE VARIABLES: SMOKING_STATUS: 0

## 2021-08-02 NOTE — ANESTHESIA PRE PROCEDURE
Department of Anesthesiology  Preprocedure Note       Name:  Dwain Garcia   Age:  78 y.o.  :  1955                                          MRN:  [id number]         Date:  2021      Surgeon: Henry Szymanski):  Ching Sierra DPM    Procedure: Procedure(s):  RIGHT 2ND TOE EXCISION OF LESION    Medications prior to admission:   Prior to Admission medications    Medication Sig Start Date End Date Taking?  Authorizing Provider   Lysine 500 MG TABS Take by mouth   Yes Historical Provider, MD   Ascorbic Acid (VITAMIN C) 250 MG tablet Take 250 mg by mouth daily   Yes Historical Provider, MD   aspirin 81 MG EC tablet Take 81 mg by mouth daily    Historical Provider, MD   hydroCHLOROthiazide (HYDRODIURIL) 25 MG tablet Take 1 tablet by mouth every morning 3/22/21   Lucio Best DO   losartan (COZAAR) 50 MG tablet Take 1 tablet by mouth daily 3/22/21   Lucio Best DO   fluticasone (FLOVENT HFA) 110 MCG/ACT inhaler Inhale 2 puffs into the lungs 2 times daily  Patient not taking: Reported on 3/22/2021 12/23/20 12/23/21  Lucio Best DO   DHEA 50 MG TABS Take 50 mg by mouth once    Historical Provider, MD   albuterol sulfate HFA (VENTOLIN HFA) 108 (90 Base) MCG/ACT inhaler Inhale 2 puffs into the lungs 4 times daily as needed for Wheezing  Patient not taking: Reported on 3/22/2021 8/10/20   Lucio Best DO   MAGNESIUM PO Take 400 mg by mouth daily    Historical Provider, MD   Lactobacillus (PROBIOTIC ACIDOPHILUS PO) Take by mouth daily     Historical Provider, MD   CINNAMON PO Take by mouth daily 2000mg    Historical Provider, MD   vitamin E 400 UNIT capsule Take 400 Units by mouth daily     Historical Provider, MD   b complex vitamins capsule Take 1 capsule by mouth daily     Historical Provider, MD   Cholecalciferol (VITAMIN D3) 25 MCG (1000 UT) CAPS Take 1 capsule by mouth daily     Historical Provider, MD   Multiple Minerals-Vitamins (LAITH-MAG-ZINC-D PO) Take 1 tablet by mouth daily     Historical Provider, MD Multiple Vitamins-Minerals (HAIR/SKIN/NAILS/BIOTIN PO) Take 1 tablet by mouth daily     Historical Provider, MD       Current medications:    Current Facility-Administered Medications   Medication Dose Route Frequency Provider Last Rate Last Admin    0.9 % sodium chloride infusion   Intravenous Continuous Lebron Luke, DPM        sodium chloride flush 0.9 % injection 5-40 mL  5-40 mL Intravenous 2 times per day Lebron Luke, DPM        sodium chloride flush 0.9 % injection 5-40 mL  5-40 mL Intravenous PRN Lebron Luke, DPM        0.9 % sodium chloride infusion  25 mL Intravenous PRN Lebron Luke, DPM        ceFAZolin (ANCEF) 2000 mg in dextrose 5 % 50 mL IVPB  2,000 mg Intravenous On Call to [address], [facility]           Allergies:     Allergies   Allergen Reactions    Seasonal        Problem List:    Patient Active Problem List   Diagnosis Code    Primary malignant neoplasm of breast without evidence of distant metastasis ([facility] C50.919    Essential hypertension I10    Enlarged thyroid E04.9    Hypoglycemia E16.2    History of colonic polyps Z86.010    Lumbar disc disease M51.9    Osteopenia of multiple sites M85.89    Other emphysema ([facility] J43.8       Past Medical History:        Diagnosis Date    Cancer ([facility]     breast    Hypertension        Past Surgical History:        Procedure Laterality Date    APPENDECTOMY      BREAST LUMPECTOMY       SECTION      x 2    HAND TENDON SURGERY Right     TUBAL LIGATION      WISDOM TOOTH EXTRACTION         Social History:    Social History     Tobacco Use    Smoking status: Never Smoker    Smokeless tobacco: Never Used   Substance Use Topics    Alcohol use: Yes     Comment: occassionally                                Counseling given: Not Answered      Vital Signs (Current):   Vitals:    21 1020   Weight: 176 lb (79.8 kg)   Height: 5' 3\" (1.6 m)                                              BP Readings from Last 3 Encounters: 07/16/21 129/84   07/09/21 136/82   03/22/21 118/80       NPO Status:                                                                                 BMI:   Wt Readings from Last 3 Encounters:   07/27/21 176 lb (79.8 kg)   07/16/21 179 lb (81.2 kg)   07/09/21 182 lb 3.2 oz (82.6 kg)     Body mass index is 31.18 kg/m². CBC:   Lab Results   Component Value Date    WBC 6.6 07/16/2021    RBC 4.81 07/16/2021    HGB 14.1 07/16/2021    HCT 43.9 07/16/2021    MCV 91.3 07/16/2021    RDW 14.5 06/04/2018     07/16/2021       CMP:   Lab Results   Component Value Date     07/16/2021    K 3.8 07/16/2021     07/16/2021    CO2 30 07/16/2021    BUN 11 07/16/2021    CREATININE 0.7 07/16/2021    LABGLOM 83 07/16/2021    GLUCOSE 79 07/16/2021    PROT 7.4 07/16/2021    CALCIUM 9.3 07/16/2021    BILITOT 0.7 07/16/2021    ALKPHOS 90 07/16/2021    AST 18 07/16/2021    ALT 14 07/16/2021       POC Tests: No results for input(s): POCGLU, POCNA, POCK, POCCL, POCBUN, POCHEMO, POCHCT in the last 72 hours.     Coags: No results found for: PROTIME, INR, APTT    HCG (If Applicable): No results found for: PREGTESTUR, PREGSERUM, HCG, HCGQUANT     ABGs: No results found for: PHART, PO2ART, YVN8YTU, ZFO0RWN, BEART, P3MQDVQJ     Type & Screen (If Applicable):  No results found for: LABABO, LABRH    Drug/Infectious Status (If Applicable):  Lab Results   Component Value Date    HEPCAB Negative 08/19/2019       COVID-19 Screening (If Applicable): No results found for: COVID19        Anesthesia Evaluation  Patient summary reviewed no history of anesthetic complications:   Airway: Mallampati: II  TM distance: >3 FB   Neck ROM: full  Mouth opening: > = 3 FB Dental: normal exam     Comment: Intact bridgework    Pulmonary: breath sounds clear to auscultation  (+) COPD (hx inhaler use):      (-) recent URI and not a current smoker                           Cardiovascular:    (+) hypertension:,         Rhythm: regular  Rate: normal

## 2021-08-02 NOTE — ANESTHESIA POSTPROCEDURE EVALUATION
Department of Anesthesiology  Postprocedure Note    Patient: Merari Tirado  MRN: [id number]  YOB: 1955  Date of evaluation: 8/2/2021  Time:  3:43 PM     Procedure Summary     Date: 08/02/21 Room / Location: [address]    Anesthesia Start: 1294 Anesthesia Stop: 1440    Procedure: RIGHT 2ND TOE EXCISION OF LESION (Right ) Diagnosis: (SOFT TISSUE MASS OF RIGHT FOOT, RIGHT TOE PAIN)    Surgeons: Nela Alba DPM Responsible Provider: Yusra Kc MD    Anesthesia Type: MAC ASA Status: 3          Anesthesia Type: MAC    Jamila Phase I:      Jamila Phase II: Jamila Score: 10    Last vitals: Reviewed and per EMR flowsheets.        Anesthesia Post Evaluation    Patient location during evaluation: PACU  Patient participation: complete - patient participated  Level of consciousness: awake  Nausea & Vomiting: no nausea  Complications: no  Cardiovascular status: hemodynamically stable  Respiratory status: spontaneous ventilation

## 2021-08-02 NOTE — OP NOTE
Operative Note      Patient: Cassie Ramachandran  YOB: 1955  MRN: [id number]    Date of Procedure: 8/2/2021    Pre-Op Diagnosis: SOFT TISSUE MASS OF RIGHT FOOT, RIGHT TOE PAIN    Post-Op Diagnosis: Same       Procedure(s):  RIGHT 2ND TOE EXCISION OF LESION    Surgeon(s):  Enoch Stephenson DPM    Assistant:   Resident: Sin Melvin DPM    Anesthesia: Monitor Anesthesia Care    Estimated Blood Loss (mL): Minimal    Complications: None    Specimens:   ID Type Source Tests Collected by Time Destination   A : right 2nd toe lesion Tissue Toe SURGICAL PATHOLOGY Enoch Stephenson DPM 8/2/2021 1426        Implants:  * No implants in log *      Drains: * No LDAs found *    Findings: consistent with diagnosis     Detailed Description of Procedure: Indications: Patient is a 78 y.o. female with a chief complaint of right 2nd toe pain  and being treated for right 2nd digit soft tissue mass. At this time all conservative options have been exhausted and surgical intervention is necessary. The procedure has been explained to the patient and they understand the risks, benefits and possible complications including bleeding, infection, recurrence, numbness, nerve damage, CRPS, loss of limb, loss of life. No promises have been made as to surgical outcome. Procedure: The patient was transported from the pre-op holding to the operating room and placed in a supine position. A pneumatic ankle tourniquet was applied to the right ankle. A pre-operative injection of 30cc of 0.5% marcaine plain was injected into the right foot in a field block. The right foot was then prepped and draped in the normal aspetic manner. The right foot was then exsanguinated with an esmark and the tourniquet was inflated to 250 mmHg. Attention was directed to the medial right 2nd digit. A linear incision was made from the DIPJ to the mid shaft of the proximal phalanx.  Blunt dissection was performed with tenotomy scissors finding the planes of the soft tissue mass. All soft tissue adhesions were bluntly dissected until there was one stalk of tissue connecting to the soft tissue mass. This stalk was then cauterized after the soft tissue mass was excised. The mass was then sent to pathology. The area was flushed with saline    The incision was flushed with copious amounts of sterile saline. The subcutaneous tissues were re-appoximated with 4-0 monocryl. The skin was closed using 4-0 prolene. The incision was dressed with adaptic, 4x4's, kerlix, etc.  The pneumatic ankle tourniquet was then deflated and an immediate hyperemic response was noted to all digits of the right foot. A surgical shoe was then applied. The patient was transported to the PACU with VSS and NVS intact to all digits of the right foot. No complications were noted throughout the procedure. The patient is to be discharged per PACU protocol. The patient is to follow-up with Dr. Shannon Scott in the office.     Dr. Shannon Scott, [place]    8/2/2021  2:41 PM          Electronically signed by Ladi Hunt DPM on 8/2/2021 at 2:41 PM

## 2021-08-02 NOTE — BRIEF OP NOTE
Brief Postoperative Note      Patient: Merari Tirado  YOB: 1955  MRN: [id number]    Date of Procedure: 8/2/2021    Pre-Op Diagnosis: SOFT TISSUE MASS OF RIGHT FOOT, RIGHT TOE PAIN    Post-Op Diagnosis: Same       Procedure(s):  RIGHT 2ND TOE EXCISION OF LESION    Surgeon(s):  Nela Alba DPM    Assistant:  Resident: ADIA Duarte, MS4    Anesthesia: Monitor Anesthesia Care    Estimated Blood Loss (mL): Minimal    Complications: None    Specimens:   ID Type Source Tests Collected by Time Destination   A : right 2nd toe lesion Tissue Toe SURGICAL PATHOLOGY Nela Alba DPM 8/2/2021 1426        Implants:  * No implants in log *      Drains: * No LDAs found *    Findings: Consistent with diagnosis    Injectables: 30cc 0.5% marcaine plain    Suture: 4-0 monocryl, 4-0 prolene    Electronically signed by Yung Fan DPM on 8/2/2021 at 2:43 PM

## 2021-08-04 NOTE — TELEPHONE ENCOUNTER
Can you call podiatry to see if they still need the latest progress note that has the surgical clearance?  She has already had the surgery

## 2021-08-10 ENCOUNTER — TELEPHONE (OUTPATIENT)
Dept: FAMILY MEDICINE CLINIC | Age: 66
End: 2021-08-10

## 2021-08-10 NOTE — TELEPHONE ENCOUNTER
Fax received from [facility]. Please complete and return. Forms in mailbox for review.

## 2021-08-24 ENCOUNTER — TELEPHONE (OUTPATIENT)
Dept: FAMILY MEDICINE CLINIC | Age: 66
End: 2021-08-24

## 2021-08-24 NOTE — TELEPHONE ENCOUNTER
DO FIDEL Rodriguez [facility]  Res[facility] Clinic Clinical Staff  No cancer - great news!           Associated Results    Surgical Pathology  Order: [id number]  Status:  Final result   Visible to patient:  Yes (MyChart)   1 Result Note     1 Patient Communication     Narrative  Performed by: Garland Tinoco. Pathology      [place], [place]                   [id number]   Assoc.                                              Page 1 of 1   [address]   [address], [address]                                                       PROC: 2021   [facility]                                    RECV: 2021   [address]                                    RPTD: 2021   [address], [place] [address]                       MRN:  [id number]    LOC: PLAS                       ACCT:            SEX: F                       [id number]       AGE: 78 Y                       : 1955                          PATHOLOGY REPORT                       ATTN: Alonso Portillo                       REQ: Alonso Portillo       Copies To:   [facility]       Clinical Information: NEOPLASM OF UNCERTAIN BEHAVIOR OF SKIN     FINAL DIAGNOSIS:   Nose, shave skin biopsy:    Superficial shave biopsy with features indicative of   angiofibroma/fibrous papule. Specimen:   SKIN BIOPSY, NOSE       Gross Examination:   The container is labeled Torie Tang, nose.  Received in formalin is a   shave skin measuring 0.5 x 0.3 x 0.1 cm.  Bisected, 1 ns. JJS:[id number]     Microscopic Examination:   Microscopic examination demonstrates a superficial shave biopsy of   skin.  There is overlying epidermal acanthosis with mild   hyperkeratosis.  Within the superficially sampled dermis, there are   ectatic capillaries associated with fibroblasts.  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  There is no evidence   of epidermal dysplasia or malignancy.      Jose Gonzalez Dr.

## 2021-08-24 NOTE — TELEPHONE ENCOUNTER
I spoke with Yuli Bryant in regards to her results, she states that she received the results of her skin biopsy from her other doctor. Yuli Bryant does not have any questions.

## 2021-10-14 ENCOUNTER — OFFICE VISIT (OUTPATIENT)
Dept: FAMILY MEDICINE CLINIC | Age: 66
End: 2021-10-14
Payer: MEDICARE

## 2021-10-14 VITALS
HEART RATE: 89 BPM | HEIGHT: 63 IN | SYSTOLIC BLOOD PRESSURE: 134 MMHG | WEIGHT: 180.8 LBS | TEMPERATURE: 96.9 F | RESPIRATION RATE: 16 BRPM | DIASTOLIC BLOOD PRESSURE: 88 MMHG | BODY MASS INDEX: 32.04 KG/M2 | OXYGEN SATURATION: 99 %

## 2021-10-14 DIAGNOSIS — Z86.010 HISTORY OF COLONIC POLYPS: ICD-10-CM

## 2021-10-14 DIAGNOSIS — E16.2 HYPOGLYCEMIA: ICD-10-CM

## 2021-10-14 DIAGNOSIS — J43.8 OTHER EMPHYSEMA (HCC): ICD-10-CM

## 2021-10-14 DIAGNOSIS — E04.9 ENLARGED THYROID: ICD-10-CM

## 2021-10-14 DIAGNOSIS — I10 ESSENTIAL HYPERTENSION: Primary | ICD-10-CM

## 2021-10-14 PROCEDURE — 99214 OFFICE O/P EST MOD 30 MIN: CPT | Performed by: FAMILY MEDICINE

## 2021-10-14 SDOH — ECONOMIC STABILITY: FOOD INSECURITY: WITHIN THE PAST 12 MONTHS, YOU WORRIED THAT YOUR FOOD WOULD RUN OUT BEFORE YOU GOT MONEY TO BUY MORE.: NEVER TRUE

## 2021-10-14 SDOH — ECONOMIC STABILITY: FOOD INSECURITY: WITHIN THE PAST 12 MONTHS, THE FOOD YOU BOUGHT JUST DIDN'T LAST AND YOU DIDN'T HAVE MONEY TO GET MORE.: NEVER TRUE

## 2021-10-14 ASSESSMENT — SOCIAL DETERMINANTS OF HEALTH (SDOH): HOW HARD IS IT FOR YOU TO PAY FOR THE VERY BASICS LIKE FOOD, HOUSING, MEDICAL CARE, AND HEATING?: NOT HARD AT ALL

## 2021-10-14 NOTE — PROGRESS NOTES
Health Maintenance Due   Topic Date Due    DTaP/Tdap/Td vaccine (1 - Tdap) Never done    Flu vaccine (1) 09/01/2021 Declined

## 2021-10-14 NOTE — PATIENT INSTRUCTIONS
Thank you   1. Thank you for trusting us with your healthcare needs. You may receive a survey regarding today's visit. It would help us out if you would take a few moments to provide your feedback. We value your input. 2. Please bring in ALL medications BOTTLES, including inhalers, herbal supplements, over the counter, prescribed & non-prescribed medicine. The office would like actual medication bottles and a list.   3. Please note our OFFICE POLICIES:   a. Prior to getting your labs drawn, please check with your insurance company for benefits and eligibility of lab services. Often, insurance companies cover certain tests for preventative visits only. It is patient's responsibility to see what is covered. b. We are unable to change a diagnosis after the test has been performed. c. Lab orders will not be re-printed. Please hold onto your original lab orders and take them to your lab to be completed. d. If you no show your scheduled appointment three times, you will be dismissed from this practice. e. Perlie Maple must be completed 24 hours prior to your schedule appointment. 4. If the list below has been completed, PLEASE FAX RECORDS TO OUR OFFICE @ [phone].  Once the records have been received we will update your records at our office:  Health Maintenance Due   Topic Date Due    DTaP/Tdap/Td vaccine (1 - Tdap) Never done    Flu vaccine (1) 09/01/2021

## 2021-10-14 NOTE — PROGRESS NOTES
[address] [facility]. [address]  [facility] [address]  Dept: [phone]  Loc: [phone]     Gab Bragg is a 78 y.o. female who presents today for:  Chief Complaint   Patient presents with    3 Month Follow-Up     Chronic Conditions       Goals      Blood Pressure < 140/90      Exercise 3x per week (30 min per time)           HPI:     HPI   Last labs July    Lung test was inconclusive    toe surgery - fibroma    Derm referral    Shortness of breath? - better lately    Mother had issues with her heart valve    Still some inner ear issues    No question data found.     Past Medical History:   Diagnosis Date    Cancer [facility])     breast    Hypertension       Past Surgical History:   Procedure Laterality Date    APPENDECTOMY      BREAST LUMPECTOMY       SECTION      x 2    FOOT SURGERY Right 2021    RIGHT 2ND TOE EXCISION OF LESION performed by Claudia De La Garza DPM at [facility]    HAND TENDON SURGERY Right     TUBAL LIGATION      WISDOM TOOTH EXTRACTION       Family History   Problem Relation Age of Onset    Heart Disease Mother     Heart Disease Father     Cancer Sister     High Blood Pressure Sister     Obesity Brother     Diabetes Brother     High Blood Pressure Brother     Cancer Sister         melanoma    High Blood Pressure Sister     Substance Abuse Sister         smoker    High Blood Pressure Sister     High Blood Pressure Brother     Heart Attack Brother      Social History     Tobacco Use    Smoking status: Never Smoker    Smokeless tobacco: Never Used   Substance Use Topics    Alcohol use: Yes     Comment: occassionally      Current Outpatient Medications   Medication Sig Dispense Refill    Menaquinone-7 (VITAMIN K2 PO) Take by mouth daily      Lysine 500 MG TABS Take by mouth daily       Ascorbic Acid (VITAMIN C) 250 MG tablet Take 250 mg by mouth daily      aspirin 81 MG EC tablet Take 81 mg by mouth daily      hydroCHLOROthiazide (HYDRODIURIL) 25 MG tablet Take 1 tablet by mouth every morning 90 tablet 3    losartan (COZAAR) 50 MG tablet Take 1 tablet by mouth daily 90 tablet 3    fluticasone (FLOVENT HFA) 110 MCG/ACT inhaler Inhale 2 puffs into the lungs 2 times daily (Patient taking differently: Inhale 2 puffs into the lungs 2 times daily as needed ) 1 Inhaler 3    DHEA 50 MG TABS Take 50 mg by mouth daily       albuterol sulfate HFA (VENTOLIN HFA) 108 (90 Base) MCG/ACT inhaler Inhale 2 puffs into the lungs 4 times daily as needed for Wheezing 1 Inhaler 0    MAGNESIUM PO Take 400 mg by mouth daily      Lactobacillus (PROBIOTIC ACIDOPHILUS PO) Take by mouth once a week       CINNAMON PO Take by mouth daily 2000mg      vitamin E 400 UNIT capsule Take 400 Units by mouth daily       b complex vitamins capsule Take 1 capsule by mouth daily       Cholecalciferol (VITAMIN D3) 25 MCG (1000 UT) CAPS Take 1 capsule by mouth 3 times daily       Multiple Minerals-Vitamins (LAITH-MAG-ZINC-D PO) Take 1 tablet by mouth daily       Multiple Vitamins-Minerals (HAIR/SKIN/NAILS/BIOTIN PO) Take 1 tablet by mouth daily        No current facility-administered medications for this visit.      Allergies   Allergen Reactions    Seasonal        Health Maintenance   Topic Date Due    DTaP/Tdap/Td vaccine (1 - Tdap) Never done    Flu vaccine (1) 09/01/2021    Pneumococcal 65+ years Vaccine (2 of 2 - PPSV23) 11/30/2021    Annual Wellness Visit (AWV)  03/23/2022    Potassium monitoring  07/16/2022    Creatinine monitoring  07/16/2022    Breast cancer screen  09/15/2022    Lipid screen  12/08/2025    Colon cancer screen colonoscopy  08/31/2028    DEXA (modify frequency per FRAX score)  Completed    Shingles Vaccine  Completed    COVID-19 Vaccine  Completed    Hepatitis C screen  Completed    Hepatitis A vaccine  Aged Out    Hepatitis B vaccine  Aged Out    Hib vaccine  Aged Out  Meningococcal (ACWY) vaccine  Aged Out       Subjective:      Review of Systems      Objective:     Vitals:    10/14/21 0954 10/14/21 1010   BP: (!) 138/98 134/88   Site: Left Upper Arm Right Upper Arm   Position: Sitting Sitting   Cuff Size: Medium Adult Medium Adult   Pulse: 89    Resp: 16    Temp: 96.9 °F (36.1 °C)    TempSrc: Skin    SpO2: 99%    Weight: 180 lb 12.8 oz (82 kg)    Height: 5' 3\" (1.6 m)        Body mass index is 32.03 kg/m². Wt Readings from Last 3 Encounters:   10/14/21 180 lb 12.8 oz (82 kg)   08/02/21 177 lb 6.4 oz (80.5 kg)   07/16/21 179 lb (81.2 kg)     BP Readings from Last 3 Encounters:   10/14/21 134/88   08/02/21 121/74   08/02/21 128/72       Physical Exam  Vitals and nursing note reviewed. Constitutional:       General: She is not in acute distress. Appearance: She is well-developed. She is not diaphoretic. HENT:      Head: Normocephalic and atraumatic. Right Ear: External ear normal.      Left Ear: External ear normal.   Eyes:      General: No scleral icterus. Right eye: No discharge. Left eye: No discharge. Conjunctiva/sclera: Conjunctivae normal.   Cardiovascular:      Rate and Rhythm: Normal rate and regular rhythm. Heart sounds: Normal heart sounds. No murmur heard. Pulmonary:      Effort: Pulmonary effort is normal.      Breath sounds: Normal breath sounds. Musculoskeletal:      Cervical back: Normal range of motion. Skin:     General: Skin is warm and dry. Findings: No erythema or rash. Neurological:      Mental Status: She is alert and oriented to person, place, and time. Cranial Nerves: No cranial nerve deficit. Psychiatric:         Behavior: Behavior normal.         Thought Content:  Thought content normal.         Judgment: Judgment normal.           Lab Results   Component Value Date    WBC 6.6 07/16/2021    HGB 14.1 07/16/2021    HCT 43.9 07/16/2021     07/16/2021    CHOL 177 12/08/2020    TRIG 88 12/08/2020    HDL 63 12/08/2020    LDLCALC 96 12/08/2020    AST 18 07/16/2021     07/16/2021    K 3.8 07/16/2021     07/16/2021    CREATININE 0.7 07/16/2021    BUN 11 07/16/2021    CO2 30 07/16/2021    TSH 2.530 12/08/2020    LABA1C 5.3 03/22/2021    LABGLOM 83 (A) 07/16/2021    MG 2.2 07/16/2021    CALCIUM 9.3 07/16/2021    VITD25 29 (L) 07/16/2021       Imaging Results:    No results found. Narrative   PROCEDURE: XR LUMBAR SPINE (2-3 VIEWS)       CLINICAL INFORMATION: Osteopenia of multiple sites, Lumbar disc disease,       COMPARISON: No prior study.       TECHNIQUE: AP and lateral views of the lumbar spine.       FINDINGS:   There is anatomic vertebral body height and alignment. No fracture is evident. There is mild disc space narrowing and anterior osteophytosis throughout the lumbar spine. There is mild facet hypertrophy at the lower lumbar levels.           Impression    Mild multilevel degenerative changes without acute osseous abnormality.                   **This report has been created using voice recognition software. It may contain minor errors which are inherent in voice recognition technology. **       Final report electronically signed by Dr. Alfa Hunt MD on 3/24/2021 5:00 PM         Assessment:      Diagnosis Orders   1. Essential hypertension     2. Other emphysema ([address]  Full PFT Study With Bronchodilator   3. Enlarged thyroid     4. Hypoglycemia     5.  History of colonic polyps         Plan:         Labs in July    Shortness of breath can do the pft    Will try to do the work outs - and lift weights    Will see you again in 4 months    Will watch the pulse at home and see what that is    Flu shot and pfizer booster        The 10-year ASCVD risk score (Sara Kee, et al., 2013) is: 9.1%    Values used to calculate the score:      Age: 78 years      Sex: Female      Is Non- : No      Diabetic: No      Tobacco smoker: No      Systolic Blood Pressure:

## 2021-12-20 ENCOUNTER — TELEPHONE (OUTPATIENT)
Dept: FAMILY MEDICINE CLINIC | Age: 66
End: 2021-12-20

## 2021-12-20 RX ORDER — SULFAMETHOXAZOLE AND TRIMETHOPRIM 800; 160 MG/1; MG/1
1 TABLET ORAL 2 TIMES DAILY
Qty: 14 TABLET | Refills: 0 | Status: SHIPPED | OUTPATIENT
Start: 2021-12-20 | End: 2021-12-27

## 2021-12-29 ENCOUNTER — APPOINTMENT (OUTPATIENT)
Dept: URBAN - METROPOLITAN AREA SURGERY 9 | Age: 66
Setting detail: DERMATOLOGY
End: 2021-12-29

## 2021-12-29 DIAGNOSIS — L57.0 ACTINIC KERATOSIS: ICD-10-CM

## 2021-12-29 PROCEDURE — 17000 DESTRUCT PREMALG LESION: CPT

## 2021-12-29 PROCEDURE — OTHER COUNSELING: OTHER

## 2021-12-29 PROCEDURE — OTHER LIQUID NITROGEN: OTHER

## 2021-12-29 PROCEDURE — OTHER OBSERVATION: OTHER

## 2021-12-29 ASSESSMENT — LOCATION DETAILED DESCRIPTION DERM: LOCATION DETAILED: NASAL SUPRATIP

## 2021-12-29 ASSESSMENT — LOCATION ZONE DERM: LOCATION ZONE: NOSE

## 2021-12-29 ASSESSMENT — LOCATION SIMPLE DESCRIPTION DERM: LOCATION SIMPLE: NOSE

## 2021-12-29 NOTE — HPI: SKIN LESION
What Type Of Note Output Would You Prefer (Optional)?: Bullet Format
How Severe Is Your Skin Lesion?: mild
Has Your Skin Lesion Been Treated?: not been treated
Is This A New Presentation, Or A Follow-Up?: Skin Lesion
Additional History: Patient states the area will get discolored and about once the month will bleed then scab. She had a similar spot removed on her nose and they told her it was just a benign growth
Which Family Member (Optional)?: Sister

## 2021-12-29 NOTE — PROCEDURE: LIQUID NITROGEN
Post-Care Instructions: I reviewed with the patient in detail post-care instructions. Patient is to wear sunprotection, and avoid picking at any of the treated lesions. Pt may apply Vaseline to crusted or scabbing areas.
Duration Of Freeze Thaw-Cycle (Seconds): 10
Consent: The patient's consent was obtained including but not limited to risks of crusting, scabbing, blistering, scarring, darker or lighter pigmentary change, recurrence, incomplete removal and infection.
Render Note In Bullet Format When Appropriate: No
Detail Level: Simple
Number Of Freeze-Thaw Cycles: 2 freeze-thaw cycles
Show Aperture Variable?: Yes

## 2022-02-03 ENCOUNTER — TELEPHONE (OUTPATIENT)
Dept: FAMILY MEDICINE CLINIC | Age: 67
End: 2022-02-03

## 2022-02-03 DIAGNOSIS — K90.9 INTESTINAL MALABSORPTION, UNSPECIFIED TYPE: ICD-10-CM

## 2022-02-03 DIAGNOSIS — E04.9 ENLARGED THYROID: ICD-10-CM

## 2022-02-03 DIAGNOSIS — I10 ESSENTIAL HYPERTENSION: Primary | ICD-10-CM

## 2022-02-03 DIAGNOSIS — J43.8 OTHER EMPHYSEMA (HCC): ICD-10-CM

## 2022-03-18 ENCOUNTER — HOSPITAL ENCOUNTER (OUTPATIENT)
Dept: PULMONOLOGY | Age: 67
Discharge: HOME | End: 2022-03-18
Payer: MEDICARE

## 2022-03-18 DIAGNOSIS — J43.8 OTHER EMPHYSEMA (HCC): ICD-10-CM

## 2022-03-18 PROCEDURE — 94060 EVALUATION OF WHEEZING: CPT

## 2022-03-18 PROCEDURE — 94726 PLETHYSMOGRAPHY LUNG VOLUMES: CPT

## 2022-03-18 PROCEDURE — 94729 DIFFUSING CAPACITY: CPT

## 2022-03-22 ENCOUNTER — TELEPHONE (OUTPATIENT)
Dept: FAMILY MEDICINE CLINIC | Age: 67
End: 2022-03-22

## 2022-03-22 NOTE — TELEPHONE ENCOUNTER
----- Message from Courtney Cadena DO sent at 3/21/2022  6:21 PM EDT -----  These results are really good!

## 2022-03-31 ENCOUNTER — TELEPHONE (OUTPATIENT)
Dept: CARDIOLOGY CLINIC | Age: 67
End: 2022-03-31

## 2022-03-31 ENCOUNTER — OFFICE VISIT (OUTPATIENT)
Dept: FAMILY MEDICINE CLINIC | Age: 67
End: 2022-03-31
Payer: MEDICARE

## 2022-03-31 VITALS
RESPIRATION RATE: 12 BRPM | OXYGEN SATURATION: 98 % | WEIGHT: 187.6 LBS | DIASTOLIC BLOOD PRESSURE: 70 MMHG | TEMPERATURE: 97.1 F | HEART RATE: 67 BPM | BODY MASS INDEX: 34.52 KG/M2 | HEIGHT: 62 IN | SYSTOLIC BLOOD PRESSURE: 118 MMHG

## 2022-03-31 DIAGNOSIS — R06.02 SHORTNESS OF BREATH: Primary | ICD-10-CM

## 2022-03-31 DIAGNOSIS — K90.9 INTESTINAL MALABSORPTION, UNSPECIFIED TYPE: ICD-10-CM

## 2022-03-31 DIAGNOSIS — C44.311 BASAL CELL CARCINOMA (BCC) OF LEFT SIDE OF NOSE: ICD-10-CM

## 2022-03-31 DIAGNOSIS — I10 ESSENTIAL HYPERTENSION: ICD-10-CM

## 2022-03-31 PROCEDURE — 99214 OFFICE O/P EST MOD 30 MIN: CPT | Performed by: FAMILY MEDICINE

## 2022-03-31 RX ORDER — HYDROCHLOROTHIAZIDE 25 MG/1
25 TABLET ORAL EVERY MORNING
Qty: 90 TABLET | Refills: 3 | Status: SHIPPED | OUTPATIENT
Start: 2022-03-31

## 2022-03-31 RX ORDER — LOSARTAN POTASSIUM 50 MG/1
50 TABLET ORAL DAILY
Qty: 90 TABLET | Refills: 3 | Status: SHIPPED | OUTPATIENT
Start: 2022-03-31

## 2022-03-31 NOTE — PATIENT INSTRUCTIONS
Thank you   1. Thank you for trusting us with your healthcare needs. You may receive a survey regarding today's visit. It would help us out if you would take a few moments to provide your feedback. We value your input. 2. Please bring in ALL medications BOTTLES, including inhalers, herbal supplements, over the counter, prescribed & non-prescribed medicine. The office would like actual medication bottles and a list.   3. Please note our OFFICE POLICIES:   a. Prior to getting your labs drawn, please check with your insurance company for benefits and eligibility of lab services. Often, insurance companies cover certain tests for preventative visits only. It is patient's responsibility to see what is covered. b. We are unable to change a diagnosis after the test has been performed. c. Lab orders will not be re-printed. Please hold onto your original lab orders and take them to your lab to be completed. d. If you no show your scheduled appointment three times, you will be dismissed from this practice. e. [place] Moo must be completed 24 hours prior to your schedule appointment. 4. If the list below has been completed, PLEASE FAX RECORDS TO OUR OFFICE @ [phone].  Once the records have been received we will update your records at our office:  Health Maintenance Due   Topic Date Due    DTaP/Tdap/Td vaccine (1 - Tdap) Never done    Flu vaccine (1) 09/01/2021    COVID-19 Vaccine (3 - Booster for Pfizer series) 09/19/2021    Pneumococcal 65+ years Vaccine (2 of 2 - PPSV23) 11/30/2021    Depression Screen  03/22/2022    Annual Wellness Visit (AWV)  03/23/2022

## 2022-03-31 NOTE — TELEPHONE ENCOUNTER
LM for pt to return call. Pt is needing a NP appt.   Diagnosis   R06.02 (ICD-10-CM) - Shortness of breath

## 2022-03-31 NOTE — PROGRESS NOTES
Health Maintenance Due   Topic Date Due    DTaP/Tdap/Td vaccine (1 - Tdap) Never done    Flu vaccine (1) 09/01/2021    COVID-19 Vaccine (3 - Booster for Pfizer series) 09/19/2021    Pneumococcal 65+ years Vaccine (2 of 2 - PPSV23) 11/30/2021    Depression Screen  03/22/2022    Annual Wellness Visit (AWV)  03/23/2022

## 2022-03-31 NOTE — PROGRESS NOTES
Sig Dispense Refill    Cholecalciferol (VITAMIN D3) 125 MCG (5000 UT) TABS Take 1 tablet by mouth daily      Multiple Vitamins-Minerals (PRESERVISION AREDS PO) Take by mouth daily      hydroCHLOROthiazide (HYDRODIURIL) 25 MG tablet Take 1 tablet by mouth every morning 90 tablet 3    losartan (COZAAR) 50 MG tablet Take 1 tablet by mouth daily 90 tablet 3    Menaquinone-7 (VITAMIN K2 PO) Take by mouth daily      aspirin 81 MG EC tablet Take 81 mg by mouth daily      fluticasone (FLOVENT HFA) 110 MCG/ACT inhaler Inhale 2 puffs into the lungs 2 times daily (Patient taking differently: Inhale 2 puffs into the lungs 2 times daily as needed ) 1 Inhaler 3    DHEA 50 MG TABS Take 50 mg by mouth daily       albuterol sulfate HFA (VENTOLIN HFA) 108 (90 Base) MCG/ACT inhaler Inhale 2 puffs into the lungs 4 times daily as needed for Wheezing 1 Inhaler 0    MAGNESIUM PO Take 400 mg by mouth daily      Lactobacillus (PROBIOTIC ACIDOPHILUS PO) Take by mouth once a week       CINNAMON PO Take by mouth daily as needed 2000mg      vitamin E 400 UNIT capsule Take 400 Units by mouth daily       b complex vitamins capsule Take 1 capsule by mouth daily       Multiple Minerals-Vitamins (LAITH-MAG-ZINC-D PO) Take 1 tablet by mouth daily       Multiple Vitamins-Minerals (HAIR/SKIN/NAILS/BIOTIN PO) Take 1 tablet by mouth daily        No current facility-administered medications for this visit.      Allergies   Allergen Reactions    Seasonal        Health Maintenance   Topic Date Due    DTaP/Tdap/Td vaccine (1 - Tdap) Never done    Flu vaccine (1) 09/01/2021    COVID-19 Vaccine (3 - Booster for Pfizer series) 09/19/2021    Pneumococcal 65+ years Vaccine (2 of 2 - PPSV23) 11/30/2021    Depression Screen  03/22/2022    Annual Wellness Visit (AWV)  03/23/2022    Potassium monitoring  07/16/2022    Creatinine monitoring  07/16/2022    Breast cancer screen  09/15/2022    Lipid screen  12/08/2025    Colorectal Cancer Screen 08/31/2028    DEXA (modify frequency per FRAX score)  Completed    Shingles Vaccine  Completed    Hepatitis C screen  Completed    Hepatitis A vaccine  Aged Out    Hepatitis B vaccine  Aged Out    Hib vaccine  Aged Out    Meningococcal (ACWY) vaccine  Aged Out       Subjective:      Review of Systems      Objective:     Vitals:    03/31/22 0936   BP: 118/70   Site: Left Upper Arm   Position: Sitting   Cuff Size: Medium Adult   Pulse: 67   Resp: 12   Temp: 97.1 °F (36.2 °C)   TempSrc: Temporal   SpO2: 98%   Weight: 187 lb 9.6 oz (85.1 kg)   Height: 5' 1.5\" (1.562 m)       Body mass index is 34.87 kg/m². Wt Readings from Last 3 Encounters:   03/31/22 187 lb 9.6 oz (85.1 kg)   10/14/21 180 lb 12.8 oz (82 kg)   08/02/21 177 lb 6.4 oz (80.5 kg)     BP Readings from Last 3 Encounters:   03/31/22 118/70   10/14/21 134/88   08/02/21 121/74       Physical Exam  Vitals and nursing note reviewed. Constitutional:       General: She is not in acute distress. Appearance: She is well-developed. She is not diaphoretic. HENT:      Head: Normocephalic and atraumatic. Right Ear: External ear normal.      Left Ear: External ear normal.   Eyes:      General: No scleral icterus. Right eye: No discharge. Left eye: No discharge. Conjunctiva/sclera: Conjunctivae normal.   Cardiovascular:      Rate and Rhythm: Normal rate and regular rhythm. Heart sounds: Normal heart sounds. No murmur heard. Pulmonary:      Effort: Pulmonary effort is normal.      Breath sounds: Normal breath sounds. Musculoskeletal:      Cervical back: Normal range of motion. Skin:     General: Skin is warm and dry. Findings: No erythema or rash. Neurological:      Mental Status: She is alert and oriented to person, place, and time. Cranial Nerves: No cranial nerve deficit. Psychiatric:         Behavior: Behavior normal.         Thought Content:  Thought content normal.         Judgment: Judgment normal. Lab Results   Component Value Date    WBC 6.6 07/16/2021    HGB 14.1 07/16/2021    HCT 43.9 07/16/2021     07/16/2021    CHOL 177 12/08/2020    TRIG 88 12/08/2020    HDL 63 12/08/2020    LDLCALC 96 12/08/2020    AST 18 07/16/2021     07/16/2021    K 3.8 07/16/2021     07/16/2021    CREATININE 0.7 07/16/2021    BUN 11 07/16/2021    CO2 30 07/16/2021    TSH 2.530 12/08/2020    LABA1C 5.3 03/22/2021    LABGLOM 83 (A) 07/16/2021    MG 2.2 07/16/2021    CALCIUM 9.3 07/16/2021    VITD25 29 (L) 07/16/2021       Imaging Results:    No results found. Assessment:      Diagnosis Orders   1. Shortness of breath  ECHO Complete 2D W Doppler W Color    Stress test, myoview    Lashell Schilling MD, Cardiology, [facility]    Basic Metabolic Panel    TSH with Reflex    Magnesium    Vitamin D 25 Hydroxy    Lipid Panel    CBC with Auto Differential    DHEA    DHEA-Sulfate   2. Essential hypertension  hydroCHLOROthiazide (HYDRODIURIL) 25 MG tablet    losartan (COZAAR) 50 MG tablet    Basic Metabolic Panel    TSH with Reflex    Magnesium    Vitamin D 25 Hydroxy    Lipid Panel    CBC with Auto Differential    DHEA    DHEA-Sulfate   3. Basal cell carcinoma (BCC) of left side of nose  TOMAS - Karyn Gaucher, MD, Dermatology, [facility]   4. Intestinal malabsorption, unspecified type  Basic Metabolic Panel    TSH with Reflex    Magnesium    Vitamin D 25 Hydroxy    Lipid Panel    CBC with Auto Differential    DHEA    DHEA-Sulfate       Plan:          Will refer to cardiology and do the stress and echo    Will keep up the vit D and calcium for osteoporosis    can do the hip xrays standing - can also do the mri of the spine in the future and order the PT for the low back arthritis    Will do the bloodwork after we see you back in June    The 10-year ASCVD risk score (Media Pile., et al., 2013) is: 7.1%    Values used to calculate the score:      Age: 78 years      Sex: Female      Is Non- : No Diabetic: No      Tobacco smoker: No      Systolic Blood Pressure: 990 mmHg      Is BP treated: Yes      HDL Cholesterol: 63 mg/dL      Total Cholesterol: 177 mg/dL        No follow-ups on file. Orders Placed:  Orders Placed This Encounter   Procedures    Basic Metabolic Panel    TSH with Reflex    Magnesium    Vitamin D 25 Hydroxy    Lipid Panel    CBC with Auto Differential    DHEA    DHEA-Sulfate    Lashell Echeverria MD, Cardiology, Ramon Benoit MD, Dermatology, [facility] Stress test, myoview    ECHO Complete 2D W Doppler W Color     Medications Prescribed:  Orders Placed This Encounter   Medications    hydroCHLOROthiazide (HYDRODIURIL) 25 MG tablet     Sig: Take 1 tablet by mouth every morning     Dispense:  90 tablet     Refill:  3    losartan (COZAAR) 50 MG tablet     Sig: Take 1 tablet by mouth daily     Dispense:  90 tablet     Refill:  3       No future appointments. Patient given educational materials - see patient instructions. Discussed use, benefit, and sideeffects of prescribed medications. All patient questions answered. Pt voiced understanding. Reviewed health maintenance. Instructed to continue current medications, diet and exercise. Patient agreed with treatment plan. Follow up as directed. I was present for the key portions of the exam and history and confirmed all areas of the note with the patient, staff and the student.       Electronically signed by Franklin Kelly DO on 3/31/2022 at 12:43 PM

## 2022-04-05 ENCOUNTER — OFFICE VISIT (OUTPATIENT)
Dept: CARDIOLOGY CLINIC | Age: 67
End: 2022-04-05
Payer: MEDICARE

## 2022-04-05 ENCOUNTER — HOSPITAL ENCOUNTER (OUTPATIENT)
Age: 67
Discharge: HOME | End: 2022-04-05
Payer: MEDICARE

## 2022-04-05 ENCOUNTER — HOSPITAL ENCOUNTER (OUTPATIENT)
Dept: NON INVASIVE DIAGNOSTICS | Age: 67
Discharge: HOME | End: 2022-04-05
Payer: MEDICARE

## 2022-04-05 VITALS
WEIGHT: 188.4 LBS | SYSTOLIC BLOOD PRESSURE: 130 MMHG | BODY MASS INDEX: 34.67 KG/M2 | HEIGHT: 62 IN | DIASTOLIC BLOOD PRESSURE: 80 MMHG | HEART RATE: 80 BPM

## 2022-04-05 DIAGNOSIS — R94.31 ABNORMAL EKG: ICD-10-CM

## 2022-04-05 DIAGNOSIS — R06.02 SOB (SHORTNESS OF BREATH): ICD-10-CM

## 2022-04-05 DIAGNOSIS — R07.89 CHEST HEAVINESS: ICD-10-CM

## 2022-04-05 DIAGNOSIS — R00.2 PALPITATIONS: ICD-10-CM

## 2022-04-05 DIAGNOSIS — R60.9 EDEMA, UNSPECIFIED TYPE: ICD-10-CM

## 2022-04-05 DIAGNOSIS — R06.02 SOB (SHORTNESS OF BREATH): Primary | ICD-10-CM

## 2022-04-05 LAB
ANION GAP SERPL CALCULATED.3IONS-SCNC: 11 MEQ/L (ref 8–16)
BUN BLDV-MCNC: 11 MG/DL (ref 7–22)
CALCIUM SERPL-MCNC: 9.4 MG/DL (ref 8.5–10.5)
CHLORIDE BLD-SCNC: 101 MEQ/L (ref 98–111)
CO2: 29 MEQ/L (ref 23–33)
CREAT SERPL-MCNC: 0.7 MG/DL (ref 0.4–1.2)
ERYTHROCYTE [DISTWIDTH] IN BLOOD BY AUTOMATED COUNT: 13.7 % (ref 11.5–14.5)
ERYTHROCYTE [DISTWIDTH] IN BLOOD BY AUTOMATED COUNT: 45.2 FL (ref 35–45)
GFR SERPL CREATININE-BSD FRML MDRD: 83 ML/MIN/1.73M2
GLUCOSE BLD-MCNC: 85 MG/DL (ref 70–108)
HCT VFR BLD CALC: 43.6 % (ref 37–47)
HEMOGLOBIN: 14.4 GM/DL (ref 12–16)
MCH RBC QN AUTO: 29.7 PG (ref 26–33)
MCHC RBC AUTO-ENTMCNC: 33 GM/DL (ref 32.2–35.5)
MCV RBC AUTO: 89.9 FL (ref 81–99)
PLATELET # BLD: 294 THOU/MM3 (ref 130–400)
PMV BLD AUTO: 8.9 FL (ref 9.4–12.4)
POTASSIUM SERPL-SCNC: 4 MEQ/L (ref 3.5–5.2)
RBC # BLD: 4.85 MILL/MM3 (ref 4.2–5.4)
SODIUM BLD-SCNC: 141 MEQ/L (ref 135–145)
WBC # BLD: 6.5 THOU/MM3 (ref 4.8–10.8)

## 2022-04-05 PROCEDURE — 36415 COLL VENOUS BLD VENIPUNCTURE: CPT

## 2022-04-05 PROCEDURE — 80048 BASIC METABOLIC PNL TOTAL CA: CPT

## 2022-04-05 PROCEDURE — 93225 XTRNL ECG REC<48 HRS REC: CPT

## 2022-04-05 PROCEDURE — 93000 ELECTROCARDIOGRAM COMPLETE: CPT | Performed by: INTERNAL MEDICINE

## 2022-04-05 PROCEDURE — 99204 OFFICE O/P NEW MOD 45 MIN: CPT | Performed by: INTERNAL MEDICINE

## 2022-04-05 PROCEDURE — 93226 XTRNL ECG REC<48 HR SCAN A/R: CPT

## 2022-04-05 PROCEDURE — 85027 COMPLETE CBC AUTOMATED: CPT

## 2022-04-05 NOTE — PROGRESS NOTES
[id number] [facility] [place] [address]  [place] [address]  Dept: [phone]  Dept Fax: [phone]  Loc: [phone]    Visit Date: 4/5/2022    Ms. Rosa Elena Sanchez is a 78 y.o. female  who presented for:  Chief Complaint   Patient presents with    New Patient       HPI:   79 yo F c hx of HTN, Breast cancer s/p chemo/radiation is here to establish cardiology and also due to shortness of breath. The chemo/radiation was 20 yrs ago. She noticed sob started about year and half, has been trying to be active. Has pulmonary workup which was negative. Has been dealing with spouse medical conditions. Sometimes does not sleep,  Wakes up in the morning and feels exhausted. Never had sleep study. EKG shows Sinus rhythm with PVCs, also reports some ankle swelling, at present there is none.         Current Outpatient Medications:     Cholecalciferol (VITAMIN D3) 125 MCG (5000 UT) TABS, Take 1 tablet by mouth daily, Disp: , Rfl:     Multiple Vitamins-Minerals (PRESERVISION AREDS PO), Take by mouth daily, Disp: , Rfl:     hydroCHLOROthiazide (HYDRODIURIL) 25 MG tablet, Take 1 tablet by mouth every morning, Disp: 90 tablet, Rfl: 3    losartan (COZAAR) 50 MG tablet, Take 1 tablet by mouth daily, Disp: 90 tablet, Rfl: 3    Menaquinone-7 (VITAMIN K2 PO), Take by mouth daily, Disp: , Rfl:     aspirin 81 MG EC tablet, Take 81 mg by mouth daily, Disp: , Rfl:     DHEA 50 MG TABS, Take 50 mg by mouth daily , Disp: , Rfl:     albuterol sulfate HFA (VENTOLIN HFA) 108 (90 Base) MCG/ACT inhaler, Inhale 2 puffs into the lungs 4 times daily as needed for Wheezing, Disp: 1 Inhaler, Rfl: 0    MAGNESIUM PO, Take 400 mg by mouth daily, Disp: , Rfl:     Lactobacillus (PROBIOTIC ACIDOPHILUS PO), Take by mouth once a week , Disp: , Rfl:     CINNAMON PO, Take by mouth daily as needed 2000mg, Disp: , Rfl:     vitamin E 400 UNIT capsule, Take 400 Units by mouth daily , Disp: , Rfl:     b complex vitamins capsule, Take 1 capsule by mouth daily , Disp: , Rfl:     Multiple Minerals-Vitamins (LAITH-MAG-ZINC-D PO), Take 1 tablet by mouth daily , Disp: , Rfl:     Multiple Vitamins-Minerals (HAIR/SKIN/NAILS/BIOTIN PO), Take 1 tablet by mouth daily , Disp: , Rfl:     fluticasone (FLOVENT HFA) 110 MCG/ACT inhaler, Inhale 2 puffs into the lungs 2 times daily (Patient taking differently: Inhale 2 puffs into the lungs 2 times daily as needed ), Disp: 1 Inhaler, Rfl: 3    Past Medical History  Malena Saez  has a past medical history of Cancer ([facility] [address] and Hypertension. Social History  Malena Saez  reports that she has never smoked. She has never used smokeless tobacco. She reports current alcohol use. She reports that she does not use drugs. Family History  Malena Saez family history includes Cancer in her sister and sister; Diabetes in her brother; Heart Attack in her brother; Heart Disease in her father and mother; High Blood Pressure in her brother, brother, sister, sister, and sister; Obesity in her brother; Substance Abuse in her sister. Past Surgical History   Past Surgical History:   Procedure Laterality Date    APPENDECTOMY      BREAST LUMPECTOMY       SECTION      x 2    FOOT SURGERY Right 2021    RIGHT 2ND TOE EXCISION OF LESION performed by John Fu DPM at [address] [place] [address] HAND TENDON SURGERY Right     TUBAL LIGATION      WISDOM TOOTH EXTRACTION         Subjective:     REVIEW OF SYSTEMS  Constitutional: denies sweats, chills and fever  HENT: denies  congestion, sinus pressure, sneezing and sore throat. Eyes: denies  pain, discharge, redness and itching. Respiratory: denies apnea, cough  Gastrointestinal: denies blood in stool, constipation, diarrhea   Endocrine: denies cold intolerance, heat intolerance, polydipsia. Genitourinary: denies dysuria, enuresis, flank pain and hematuria. Musculoskeletal: denies arthralgias, joint swelling and neck pain. Neurological: denies numbness and headaches. Psychiatric/Behavioral: denies agitation, confusion, decreased concentration and dysphoric mood    All others reviewed and are negative. Objective:     /80   Pulse 80   Ht 5' 1.5\" (1.562 m)   Wt 188 lb 6.4 oz (85.5 kg)   LMP 01/01/1999   BMI 35.02 kg/m²     Wt Readings from Last 3 Encounters:   04/05/22 188 lb 6.4 oz (85.5 kg)   03/31/22 187 lb 9.6 oz (85.1 kg)   10/14/21 180 lb 12.8 oz (82 kg)     BP Readings from Last 3 Encounters:   04/05/22 130/80   03/31/22 118/70   10/14/21 134/88       PHYSICAL EXAM  Constitutional: Oriented to person, place, and time. Appears well-developed and well-nourished. HENT:   Head: Normocephalic and atraumatic. Eyes: EOM are normal. Pupils are equal, round, and reactive to light. Neck: Normal range of motion. Neck supple. No JVD present. Cardiovascular: Normal rate ,irregular, normal heart sounds and intact distal pulses. Pulmonary/Chest: Effort normal and breath sounds normal. No respiratory distress. No wheezes. No rales. Abdominal: Soft. Bowel sounds are normal. No distension. There is no tenderness. Musculoskeletal: Normal range of motion. No edema. Neurological: Alert and oriented to person, place, and time. No cranial nerve deficit. Coordination normal.   Skin: Skin is warm and dry. Psychiatric: Normal mood and affect.        No results found for: CKTOTAL, CKMB, CKMBINDEX    Lab Results   Component Value Date    WBC 6.6 07/16/2021    RBC 4.81 07/16/2021    HGB 14.1 07/16/2021    HCT 43.9 07/16/2021    MCV 91.3 07/16/2021    MCH 29.3 07/16/2021    MCHC 32.1 07/16/2021    RDW 14.5 06/04/2018     07/16/2021    MPV 10.2 07/16/2021       Lab Results   Component Value Date     07/16/2021    K 3.8 07/16/2021     07/16/2021    CO2 30 07/16/2021    BUN 11 07/16/2021    LABALBU 4.2 07/16/2021    CREATININE 0.7 07/16/2021    CALCIUM 9.3 07/16/2021    LABGLOM 83 07/16/2021    GLUCOSE 79 07/16/2021       Lab Results   Component Value Date    ALKPHOS 90 07/16/2021    ALT 14 07/16/2021    AST 18 07/16/2021    PROT 7.4 07/16/2021    BILITOT 0.7 07/16/2021    BILIDIR <0.2 08/19/2019    LABALBU 4.2 07/16/2021       Lab Results   Component Value Date    MG 2.2 07/16/2021       No results found for: INR, PROTIME      Lab Results   Component Value Date    LABA1C 5.3 03/22/2021    LABA1C 5.5 06/04/2018       Lab Results   Component Value Date    TRIG 88 12/08/2020    HDL 63 12/08/2020    LDLCALC 96 12/08/2020       Lab Results   Component Value Date    TSH 2.530 12/08/2020         Testing Reviewed:      I haveindividually reviewed the below cardiac tests    EKG:    ECHO: No results found for this or any previous visit. STRESS:    CATH:    Assessment/Plan       Diagnosis Orders   1. SOB (shortness of breath)  EKG 12 lead   2. Palpitations  EKG 12 lead   3. Chest heaviness  EKG 12 lead     Shortness of breath  PVCs  Exhaustion, progressive  Leg swelling  HTN  Breast cancer chemo/radiation 20 yrs ago    Will get Labs to check for electrolytes abnormalites for PVCs  Labs in June were normal, but on HCTZ  Get Echo and Stress test  Will get Holter to quantify PVCs  If these tests are negative will, need sleep study  Also consider venous insufficiency study for ankle swelling  The patient is asked to make an attempt to improve diet and exercise patterns to aid in medical management of this problem. Advised more plant based nutrition/meditarrean diet   Advised patient to call office or seek immediate medical attention if there is any new onset of  any chest pain, sob, palpitations, lightheadedness, dizziness, orthopnea, PND or pedal edema. All medication side effects were discussed in details. Thank youfor allowing me to participate in the care of this patient. Please do not hesitate to contact me for any further questions.      Return in about 2 weeks (around 4/19/2022), or if symptoms worsen or fail to improve, for Review testing, Regular follow up.        Electronically signed by David Trevizo MD [facility]  4/5/2022 at 9:11 AM EDT

## 2022-04-05 NOTE — PROCEDURES
24 hour Holter Monitor was applied to patient at. Patient was instructed to remove monitor on 04/06/2022 at 1030 and return to the  of the hospital. Instructions were given on how to turn monitor off after removal. The serial number on the Holter Monitor is [id number].

## 2022-04-15 ENCOUNTER — HOSPITAL ENCOUNTER (OUTPATIENT)
Dept: NON INVASIVE DIAGNOSTICS | Age: 67
Discharge: HOME | End: 2022-04-15
Payer: MEDICARE

## 2022-04-15 DIAGNOSIS — R07.89 CHEST HEAVINESS: ICD-10-CM

## 2022-04-15 DIAGNOSIS — R06.02 SOB (SHORTNESS OF BREATH): ICD-10-CM

## 2022-04-15 DIAGNOSIS — R60.9 EDEMA, UNSPECIFIED TYPE: ICD-10-CM

## 2022-04-15 DIAGNOSIS — R00.2 PALPITATIONS: ICD-10-CM

## 2022-04-15 DIAGNOSIS — R94.31 ABNORMAL EKG: ICD-10-CM

## 2022-04-15 LAB
LV EF: 58 %
LV EF: 61 %
LVEF MODALITY: NORMAL
LVEF MODALITY: NORMAL

## 2022-04-15 PROCEDURE — 3430000000 HC RX DIAGNOSTIC RADIOPHARMACEUTICAL: Performed by: INTERNAL MEDICINE

## 2022-04-15 PROCEDURE — 93306 TTE W/DOPPLER COMPLETE: CPT

## 2022-04-15 PROCEDURE — A9500 TC99M SESTAMIBI: HCPCS | Performed by: INTERNAL MEDICINE

## 2022-04-15 PROCEDURE — 93017 CV STRESS TEST TRACING ONLY: CPT | Performed by: INTERNAL MEDICINE

## 2022-04-15 PROCEDURE — 78452 HT MUSCLE IMAGE SPECT MULT: CPT

## 2022-04-15 RX ADMIN — Medication 34.7 MILLICURIE: at 13:45

## 2022-04-15 RX ADMIN — Medication 10.3 MILLICURIE: at 12:25

## 2022-04-21 LAB
ACQUISITION DURATION: NORMAL S
AVERAGE HEART RATE: 74 BPM
HOOKUP DATE: NORMAL
HOOKUP TIME: NORMAL
MAX HEART RATE TIME/DATE: NORMAL
MAX HEART RATE: 108 BPM
MIN HEART RATE TIME/DATE: NORMAL
MIN HEART RATE: 54 BPM
NUMBER OF QRS COMPLEXES: NORMAL
NUMBER OF SUPRAVENTRICULAR COUPLETS: 0
NUMBER OF SUPRAVENTRICULAR ECTOPICS: 30
NUMBER OF SUPRAVENTRICULAR ISOLATED BEATS: 30
NUMBER OF VENTRICULAR BIGEMINAL CYCLES: 0
NUMBER OF VENTRICULAR COUPLETS: 9
NUMBER OF VENTRICULAR ECTOPICS: 6106

## 2022-04-29 ENCOUNTER — OFFICE VISIT (OUTPATIENT)
Dept: CARDIOLOGY CLINIC | Age: 67
End: 2022-04-29
Payer: MEDICARE

## 2022-04-29 VITALS
HEART RATE: 60 BPM | HEIGHT: 61 IN | BODY MASS INDEX: 35.53 KG/M2 | SYSTOLIC BLOOD PRESSURE: 150 MMHG | WEIGHT: 188.2 LBS | DIASTOLIC BLOOD PRESSURE: 88 MMHG

## 2022-04-29 DIAGNOSIS — E66.01 SEVERE OBESITY (BMI 35.0-39.9) WITH COMORBIDITY (HCC): ICD-10-CM

## 2022-04-29 DIAGNOSIS — Z09 FOLLOW UP: Primary | ICD-10-CM

## 2022-04-29 PROCEDURE — 99213 OFFICE O/P EST LOW 20 MIN: CPT | Performed by: INTERNAL MEDICINE

## 2022-04-29 NOTE — PROGRESS NOTES
[address] [facility] [address]  [place] [address]  Dept: [phone]  Dept Fax: [phone]  Loc: [phone]    Visit Date: 4/29/2022    Ms. Yuly Sánchez is a 78 y.o. female  who presented for:  Chief Complaint   Patient presents with    Follow-up       HPI:   77 yo F c hx of HTN, Breast cancer s/p chemo/radiation is here to establish cardiology and also due to shortness of breath. The chemo/radiation was 20 yrs ago. She noticed sob started about year and half, has been trying to be active. Has pulmonary workup which was negative. Has been dealing with spouse medical conditions. Sometimes does not sleep,  Wakes up in the morning and feels exhausted. Never had sleep study. EKG shows Sinus rhythm with PVCs, also reports some ankle swelling, at present there is none. She comes today for a follow up. She underwent echo and stress test.   She performed well on stress test, PVCs decreased with peak exercise. Echo showed no structural heart disease.        Current Outpatient Medications:     Cholecalciferol (VITAMIN D3) 125 MCG (5000 UT) TABS, Take 1 tablet by mouth daily, Disp: , Rfl:     Multiple Vitamins-Minerals (PRESERVISION AREDS PO), Take by mouth daily, Disp: , Rfl:     hydroCHLOROthiazide (HYDRODIURIL) 25 MG tablet, Take 1 tablet by mouth every morning, Disp: 90 tablet, Rfl: 3    losartan (COZAAR) 50 MG tablet, Take 1 tablet by mouth daily, Disp: 90 tablet, Rfl: 3    Menaquinone-7 (VITAMIN K2 PO), Take by mouth daily, Disp: , Rfl:     aspirin 81 MG EC tablet, Take 81 mg by mouth daily, Disp: , Rfl:     DHEA 50 MG TABS, Take 50 mg by mouth daily , Disp: , Rfl:     albuterol sulfate HFA (VENTOLIN HFA) 108 (90 Base) MCG/ACT inhaler, Inhale 2 puffs into the lungs 4 times daily as needed for Wheezing, Disp: 1 Inhaler, Rfl: 0    MAGNESIUM PO, Take 400 mg by mouth daily, Disp: , Rfl:     Lactobacillus (PROBIOTIC ACIDOPHILUS PO), Take by mouth once a week , Disp: , Rfl:     vitamin E 400 UNIT capsule, Take 400 Units by mouth daily , Disp: , Rfl:     b complex vitamins capsule, Take 1 capsule by mouth daily , Disp: , Rfl:     Multiple Minerals-Vitamins (LAITH-MAG-ZINC-D PO), Take 1 tablet by mouth daily , Disp: , Rfl:     Multiple Vitamins-Minerals (HAIR/SKIN/NAILS/BIOTIN PO), Take 1 tablet by mouth daily , Disp: , Rfl:     Past Medical History  Yari Olson  has a past medical history of Cancer ([address] and Hypertension. Social History  Yari Olson  reports that she has never smoked. She has never used smokeless tobacco. She reports current alcohol use. She reports that she does not use drugs. Family History  Yari Olson family history includes Cancer in her sister and sister; Diabetes in her brother; Heart Attack in her brother; Heart Disease in her father and mother; High Blood Pressure in her brother, brother, sister, sister, and sister; Obesity in her brother; Substance Abuse in her sister. Past Surgical History   Past Surgical History:   Procedure Laterality Date    APPENDECTOMY      BREAST LUMPECTOMY       SECTION      x 2    FOOT SURGERY Right 2021    RIGHT 2ND TOE EXCISION OF LESION performed by Ronaldo Mckeon DPM at [address] [place] [address] HAND TENDON SURGERY Right     TUBAL LIGATION      WISDOM TOOTH EXTRACTION         Subjective:     REVIEW OF SYSTEMS  Constitutional: denies sweats, chills and fever  HENT: denies  congestion, sinus pressure, sneezing and sore throat. Eyes: denies  pain, discharge, redness and itching. Respiratory: denies apnea, cough  Gastrointestinal: denies blood in stool, constipation, diarrhea   Endocrine: denies cold intolerance, heat intolerance, polydipsia. Genitourinary: denies dysuria, enuresis, flank pain and hematuria. Musculoskeletal: denies arthralgias, joint swelling and neck pain. Neurological: denies numbness and headaches.    Psychiatric/Behavioral: denies agitation, confusion, decreased concentration and dysphoric mood    All others reviewed and are negative. Objective:     BP (!) 150/88   Pulse 60 Comment: irregular  Ht 5' 1\" (1.549 m)   Wt 188 lb 3.2 oz (85.4 kg)   LMP 01/01/1999   BMI 35.56 kg/m²     Wt Readings from Last 3 Encounters:   04/29/22 188 lb 3.2 oz (85.4 kg)   04/05/22 188 lb 6.4 oz (85.5 kg)   03/31/22 187 lb 9.6 oz (85.1 kg)     BP Readings from Last 3 Encounters:   04/29/22 (!) 150/88   04/05/22 130/80   03/31/22 118/70       PHYSICAL EXAM  Constitutional: Oriented to person, place, and time. Appears well-developed and well-nourished. HENT:   Head: Normocephalic and atraumatic. Eyes: EOM are normal. Pupils are equal, round, and reactive to light. Neck: Normal range of motion. Neck supple. No JVD present. Cardiovascular: Normal rate ,irregular, normal heart sounds and intact distal pulses. Pulmonary/Chest: Effort normal and breath sounds normal. No respiratory distress. No wheezes. No rales. Abdominal: Soft. Bowel sounds are normal. No distension. There is no tenderness. Musculoskeletal: Normal range of motion. No edema. Neurological: Alert and oriented to person, place, and time. No cranial nerve deficit. Coordination normal.   Skin: Skin is warm and dry. Psychiatric: Normal mood and affect.        No results found for: CKTOTAL, CKMB, CKMBINDEX    Lab Results   Component Value Date    WBC 6.5 04/05/2022    RBC 4.85 04/05/2022    HGB 14.4 04/05/2022    HCT 43.6 04/05/2022    MCV 89.9 04/05/2022    MCH 29.7 04/05/2022    MCHC 33.0 04/05/2022    RDW 14.5 06/04/2018     04/05/2022    MPV 8.9 04/05/2022       Lab Results   Component Value Date     04/05/2022    K 4.0 04/05/2022     04/05/2022    CO2 29 04/05/2022    BUN 11 04/05/2022    LABALBU 4.2 07/16/2021    CREATININE 0.7 04/05/2022    CALCIUM 9.4 04/05/2022    LABGLOM 83 04/05/2022    GLUCOSE 85 04/05/2022       Lab Results   Component Value Date    ALKPHOS 90 07/16/2021    ALT 14 07/16/2021    AST 18 07/16/2021    PROT 7.4 07/16/2021    BILITOT 0.7 07/16/2021    BILIDIR <0.2 08/19/2019    LABALBU 4.2 07/16/2021       Lab Results   Component Value Date    MG 2.2 07/16/2021       No results found for: INR, PROTIME      Lab Results   Component Value Date    LABA1C 5.3 03/22/2021    LABA1C 5.5 06/04/2018       Lab Results   Component Value Date    TRIG 88 12/08/2020    HDL 63 12/08/2020    LDLCALC 96 12/08/2020       Lab Results   Component Value Date    TSH 2.530 12/08/2020         Testing Reviewed:      I haveindividually reviewed the below cardiac tests    EKG:    ECHO: No results found for this or any previous visit. STRESS:    CATH:    Assessment/Plan       Diagnosis Orders   1. Follow up     2. Severe obesity (BMI 35.0-39. 9) with comorbidity (HCC)           Exhaustion, progressive  PVCs, improved with peak exercise  Leg swelling  HTN  Breast cancer chemo/radiation 20 yrs ago    Labs within normal limits  Labs in June were normal, but on HCTZ  reviewed Echo and Stress test  Had 6% PVCs on holter  Consider sleep study  Also consider venous insufficiency study for ankle swelling  The patient is asked to make an attempt to improve diet and exercise patterns to aid in medical management of this problem. Advised more plant based nutrition/meditarrean diet   Advised patient to call office or seek immediate medical attention if there is any new onset of  any chest pain, sob, palpitations, lightheadedness, dizziness, orthopnea, PND or pedal edema. All medication side effects were discussed in details. Thank you for allowing me to participate in the care of this patient. Please do not hesitate to contact me for any further questions. Return if symptoms worsen or fail to improve, for Review testing, Regular follow up.        Electronically signed by Yasmany Lebron MD [facility] [place]  4/29/2022 at 9:11 AM EDT

## 2022-08-24 LAB — FECAL BLOOD IMMUNOCHEMICAL TEST: NEGATIVE

## 2022-09-15 ENCOUNTER — TELEPHONE (OUTPATIENT)
Dept: CARDIOLOGY CLINIC | Age: 67
End: 2022-09-15

## 2022-12-12 ENCOUNTER — TELEPHONE (OUTPATIENT)
Dept: FAMILY MEDICINE CLINIC | Age: 67
End: 2022-12-12

## 2022-12-12 NOTE — TELEPHONE ENCOUNTER
----- Message from Annmarie 143 sent at 12/12/2022  8:39 AM EST -----  Subject: Message to Provider    QUESTIONS  Information for Provider? patient has an upcoming appt on 12/21. She wants   to know if she should have lab work done prior because she was having   symptoms of lime disease. Had a 10 day flare up and it has settled down so   would it show up in the labs? Also, should she come in sooner to be   checked out? please call her to let her know if she should do labs and if   she should come in sooner  ---------------------------------------------------------------------------  --------------  [address]  [id number]; OK to leave message on voicemail  ---------------------------------------------------------------------------  --------------  SCRIPT ANSWERS  Relationship to Patient?  Self

## 2022-12-21 ENCOUNTER — OFFICE VISIT (OUTPATIENT)
Dept: FAMILY MEDICINE CLINIC | Age: 67
End: 2022-12-21
Payer: MEDICARE

## 2022-12-21 ENCOUNTER — HOSPITAL ENCOUNTER (OUTPATIENT)
Age: 67
Discharge: HOME | End: 2022-12-21
Payer: MEDICARE

## 2022-12-21 VITALS
HEART RATE: 78 BPM | RESPIRATION RATE: 12 BRPM | OXYGEN SATURATION: 98 % | WEIGHT: 181.2 LBS | BODY MASS INDEX: 34.21 KG/M2 | TEMPERATURE: 97.1 F | DIASTOLIC BLOOD PRESSURE: 78 MMHG | SYSTOLIC BLOOD PRESSURE: 122 MMHG | HEIGHT: 61 IN

## 2022-12-21 DIAGNOSIS — N64.89 BREAST ASYMMETRY: ICD-10-CM

## 2022-12-21 DIAGNOSIS — R21 RASH: ICD-10-CM

## 2022-12-21 DIAGNOSIS — I10 ESSENTIAL HYPERTENSION: ICD-10-CM

## 2022-12-21 DIAGNOSIS — M85.88 OSTEOPENIA OF LUMBAR SPINE: ICD-10-CM

## 2022-12-21 DIAGNOSIS — L50.9 URTICARIA: Primary | ICD-10-CM

## 2022-12-21 DIAGNOSIS — R25.2 LEG CRAMPS: ICD-10-CM

## 2022-12-21 DIAGNOSIS — E04.9 ENLARGED THYROID: ICD-10-CM

## 2022-12-21 DIAGNOSIS — Z13.220 LIPID SCREENING: ICD-10-CM

## 2022-12-21 DIAGNOSIS — C50.912: ICD-10-CM

## 2022-12-21 PROCEDURE — 3074F SYST BP LT 130 MM HG: CPT | Performed by: STUDENT IN AN ORGANIZED HEALTH CARE EDUCATION/TRAINING PROGRAM

## 2022-12-21 PROCEDURE — 1123F ACP DISCUSS/DSCN MKR DOCD: CPT | Performed by: STUDENT IN AN ORGANIZED HEALTH CARE EDUCATION/TRAINING PROGRAM

## 2022-12-21 PROCEDURE — 3078F DIAST BP <80 MM HG: CPT | Performed by: STUDENT IN AN ORGANIZED HEALTH CARE EDUCATION/TRAINING PROGRAM

## 2022-12-21 PROCEDURE — 36415 COLL VENOUS BLD VENIPUNCTURE: CPT

## 2022-12-21 PROCEDURE — 99214 OFFICE O/P EST MOD 30 MIN: CPT | Performed by: STUDENT IN AN ORGANIZED HEALTH CARE EDUCATION/TRAINING PROGRAM

## 2022-12-21 PROCEDURE — 86617 LYME DISEASE ANTIBODY: CPT

## 2022-12-21 RX ORDER — DOXYCYCLINE HYCLATE 100 MG
100 TABLET ORAL 2 TIMES DAILY
Qty: 10 TABLET | Refills: 0 | Status: CANCELLED | OUTPATIENT
Start: 2022-12-21 | End: 2022-12-26

## 2022-12-21 SDOH — ECONOMIC STABILITY: FOOD INSECURITY: WITHIN THE PAST 12 MONTHS, THE FOOD YOU BOUGHT JUST DIDN'T LAST AND YOU DIDN'T HAVE MONEY TO GET MORE.: NEVER TRUE

## 2022-12-21 SDOH — ECONOMIC STABILITY: FOOD INSECURITY: WITHIN THE PAST 12 MONTHS, YOU WORRIED THAT YOUR FOOD WOULD RUN OUT BEFORE YOU GOT MONEY TO BUY MORE.: NEVER TRUE

## 2022-12-21 ASSESSMENT — PATIENT HEALTH QUESTIONNAIRE - PHQ9
SUM OF ALL RESPONSES TO PHQ9 QUESTIONS 1 & 2: 0
SUM OF ALL RESPONSES TO PHQ QUESTIONS 1-9: 0
2. FEELING DOWN, DEPRESSED OR HOPELESS: 0
SUM OF ALL RESPONSES TO PHQ QUESTIONS 1-9: 0
1. LITTLE INTEREST OR PLEASURE IN DOING THINGS: 0

## 2022-12-21 ASSESSMENT — SOCIAL DETERMINANTS OF HEALTH (SDOH): HOW HARD IS IT FOR YOU TO PAY FOR THE VERY BASICS LIKE FOOD, HOUSING, MEDICAL CARE, AND HEATING?: NOT HARD AT ALL

## 2022-12-21 NOTE — PATIENT INSTRUCTIONS
Encouraged Bivalent COVID VACCINE booster  Encouraged FLU VACCINE   Encouraged TETANUS SHOT (TdaP/ ADACEL/ BOOSTRIX) per personal pharmacy. Encouraged PNEUMOVAX 23 at this time  PAP/ PELVIC management to be discussed at future. MAMMO overdue at this time. COLONOSCOPY done 8/31/2018 per Dr. Lola Boeck- to follow up again in 8/2023  DEXA done 8/19/2019- to do again at this time.

## 2022-12-21 NOTE — PROGRESS NOTES
[address] [address] [address] [place] [address]  Dept: [phone]  Dept Fax: [phone]    EVELIO Morris is a 75 y. o.female    Chief Complaint   Patient presents with    Follow-up     Pt presents for a f/u. Pt would like to discuss Vein Stripping. She would also like to discuss breast reduction for her L breast. She has also been having issues with a  rash. She discussed this with her daughter who is a physician who recommended she get tested for lyme's. Chief complaint, [place], and all pertinent details of the case reviewed with the resident. Please see resident's note for specific details discussed at today's visit.     Patient Active Problem List   Diagnosis    Primary malignant neoplasm of breast without evidence of distant metastasis ([address]    Essential hypertension    Enlarged thyroid    Hypoglycemia    History of colonic polyps    Lumbar disc disease    Osteopenia of multiple sites    Other emphysema (HCC)       Current Outpatient Medications   Medication Sig Dispense Refill    Cholecalciferol (VITAMIN D3) 125 MCG (5000 UT) TABS Take 1 tablet by mouth daily      Multiple Vitamins-Minerals (PRESERVISION AREDS PO) Take by mouth daily      hydroCHLOROthiazide (HYDRODIURIL) 25 MG tablet Take 1 tablet by mouth every morning 90 tablet 3    losartan (COZAAR) 50 MG tablet Take 1 tablet by mouth daily 90 tablet 3    aspirin 81 MG EC tablet Take 81 mg by mouth daily      albuterol sulfate HFA (VENTOLIN HFA) 108 (90 Base) MCG/ACT inhaler Inhale 2 puffs into the lungs 4 times daily as needed for Wheezing 1 Inhaler 0    MAGNESIUM PO Take 400 mg by mouth daily      Lactobacillus (PROBIOTIC ACIDOPHILUS PO) Take by mouth once a week       Multiple Minerals-Vitamins (LAITH-MAG-ZINC-D PO) Take 1 tablet by mouth daily       Menaquinone-7 (VITAMIN K2 PO) Take by mouth daily (Patient not taking: Reported on 12/21/2022)      DHEA 50 MG TABS Take 50 mg by mouth daily  (Patient not taking: Reported on 12/21/2022)      vitamin E 400 UNIT capsule Take 400 Units by mouth daily  (Patient not taking: Reported on 12/21/2022)      b complex vitamins capsule Take 1 capsule by mouth daily  (Patient not taking: Reported on 12/21/2022)      Multiple Vitamins-Minerals (HAIR/SKIN/NAILS/BIOTIN PO) Take 1 tablet by mouth daily  (Patient not taking: Reported on 12/21/2022)       No current facility-administered medications for this visit. Review of Systems per resident physician    OBJECTIVE     /78   Pulse 78   Temp 97.1 °F (36.2 °C)   Resp 12   Ht 5' 1\" (1.549 m)   Wt 181 lb 3.2 oz (82.2 kg)   LMP 01/01/1999   SpO2 98%   BMI 34.24 kg/m²   BP Readings from Last 3 Encounters:   12/21/22 122/78   04/29/22 (!) 150/88   04/05/22 130/80       Wt Readings from Last 3 Encounters:   12/21/22 181 lb 3.2 oz (82.2 kg)   04/29/22 188 lb 3.2 oz (85.4 kg)   04/05/22 188 lb 6.4 oz (85.5 kg)     Body mass index is 34.24 kg/m². Physical Exam  Vitals and nursing note reviewed. Constitutional:       General: She is not in acute distress. Appearance: Normal appearance. She is normal weight. She is not ill-appearing, toxic-appearing or diaphoretic. HENT:      Head: Normocephalic and atraumatic. Nose: Nose normal.   Eyes:      General: No scleral icterus. Right eye: No discharge. Left eye: No discharge. Extraocular Movements: Extraocular movements intact. Conjunctiva/sclera: Conjunctivae normal.      Pupils: Pupils are equal, round, and reactive to light. Neck:      Vascular: No carotid bruit. Cardiovascular:      Rate and Rhythm: Normal rate and regular rhythm. Heart sounds: Normal heart sounds. No murmur heard. No friction rub. No gallop. Pulmonary:      Effort: Pulmonary effort is normal. No respiratory distress. Breath sounds: Normal breath sounds. No stridor. No wheezing, rhonchi or rales.    Abdominal:      General: Abdomen is flat. Bowel sounds are normal. There is no distension. Palpations: Abdomen is soft. There is no mass. Tenderness: There is no abdominal tenderness. There is no guarding or rebound. Hernia: No hernia is present. Musculoskeletal:         General: No swelling or signs of injury. Normal range of motion. Cervical back: Normal range of motion. Right lower leg: No edema. Left lower leg: No edema. Skin:     General: Skin is warm and dry. Coloration: Skin is not jaundiced or pale. Findings: No bruising, erythema, lesion or rash. Neurological:      General: No focal deficit present. Mental Status: She is alert and oriented to person, place, and time. Psychiatric:         Mood and Affect: Mood normal.         Behavior: Behavior normal.         Thought Content: Thought content normal.         Judgment: Judgment normal.        No results found for this visit on 12/21/22.     Lab Results   Component Value Date    LABA1C 5.3 03/22/2021       Lab Results   Component Value Date    CHOL 177 12/08/2020    TRIG 88 12/08/2020    HDL 63 12/08/2020    LDLCALC 96 12/08/2020       The 10-year ASCVD risk score (Dayne DK, et al., 2019) is: 8.6%    Values used to calculate the score:      Age: 75 years      Sex: Female      Is Non- : No      Diabetic: No      Tobacco smoker: No      Systolic Blood Pressure: 117 mmHg      Is BP treated: Yes      HDL Cholesterol: 63 mg/dL      Total Cholesterol: 177 mg/dL    Lab Results   Component Value Date     04/05/2022    K 4.0 04/05/2022     04/05/2022    CO2 29 04/05/2022    BUN 11 04/05/2022    CREATININE 0.7 04/05/2022    GLUCOSE 85 04/05/2022    CALCIUM 9.4 04/05/2022    PROT 7.4 07/16/2021    LABALBU 4.2 07/16/2021    BILITOT 0.7 07/16/2021    ALKPHOS 90 07/16/2021    AST 18 07/16/2021    ALT 14 07/16/2021    LABGLOM 83 (A) 04/05/2022     CrCl cannot be calculated (Patient's most recent lab result is older than the maximum 180 days allowed. ). No results found for: Ruthie Burt    Lab Results   Component Value Date    TSH 2.530 12/08/2020       Lab Results   Component Value Date    WBC 6.5 04/05/2022    HGB 14.4 04/05/2022    HCT 43.6 04/05/2022    MCV 89.9 04/05/2022     04/05/2022       No results found for: PSA, PSADIA    Immunization History   Administered Date(s) Administered    COVID-19, PFIZER PURPLE top, DILUTE for use, (age 15 y+), 30mcg/0.3mL 03/29/2021, 04/19/2021    Influenza Vaccine, unspecified formulation 10/16/2012, 10/15/2013    Influenza Virus Vaccine 11/30/2020    Influenza, FLUARIX, FLULAVAL, FLUZONE (age 10 mo+) AND AFLURIA, (age 1 y+), PF, 0.5mL 10/11/2016    Influenza, High Dose (Fluzone 65 yrs and older) 11/30/2020    PPD Test 07/09/2021    Pneumococcal Conjugate 13-valent (Kylie Mon) 11/30/2020    Zoster Recombinant (Shingrix) 09/13/2019, 12/05/2019       Health Maintenance   Topic Date Due    DTaP/Tdap/Td vaccine (1 - Tdap) Never done    COVID-19 Vaccine (3 - Booster for Pfizer series) 06/14/2021    Pneumococcal 65+ years Vaccine (2 - PPSV23 if available, else PCV20) 11/30/2021    Depression Screen  03/22/2022    Annual Wellness Visit (AWV)  03/23/2022    Flu vaccine (1) 08/01/2022    Breast cancer screen  09/15/2022    Lipids  12/08/2025    Colorectal Cancer Screen  08/31/2028    DEXA (modify frequency per FRAX score)  Completed    Shingles vaccine  Completed    Hepatitis C screen  Completed    Hepatitis A vaccine  Aged Out    Hib vaccine  Aged Out    Meningococcal (ACWY) vaccine  Aged Out       CT Lung Screen (50-80, 20 pk-yrs, smoking or quit <15 years) indicated at this time? No tobacco history  Sleep Medicine referral indicated at this time (Obesity, Snoring, Daytime Somnolence, Apneic Episodes)? Pt denies any current symptoms.        Future Appointments   Date Time Provider Chapin Isaac   6/27/2023  2:40 PM [facility] [address] Diagnosis Orders   1. Urticaria        2. Primary malignant neoplasm of left breast without evidence of distant metastasis ([facility] [address]  Wild Lim MD, Plastic Surgery, [place] [facility]      3. Breast asymmetry  Wild Lim MD, Plastic Surgery, [facility]      4. Rash  B. Burgdorfi Antibodies      5. Leg cramps        6. Osteopenia of lumbar spine  DEXA BONE DENSITY 2 SITES      7. Enlarged thyroid  TSH With Reflex Ft4      8. Essential hypertension  CBC with Auto Differential    Comprehensive Metabolic Panel      9. Lipid screening  Lipid, Fasting          PLAN      After discussion with pt and resident provider, we agreed on plan as follows:    Check Lyme titers  Will consider doxycycline if Lyme titers positive  Consider referral to vein clinic if patient's prefer  Apple cider vinegar-1 teaspoon in 4 ounces of water  Increase water intake to 80 ounces daily  Refer to Dr. Gopal Ruiz for consideration of breast reduction  Check FLP, CMP, free T4/TSH, and CBC  Follow-up in 6 months    Preventive Health Topics:  Encouraged Bivalent COVID VACCINE booster  Encouraged FLU VACCINE   Encouraged TETANUS SHOT (TdaP/ ADACEL/ BOOSTRIX) per personal pharmacy. Encouraged PNEUMOVAX 23 at this time  PAP/ PELVIC management to be discussed at future. MAMMO overdue at this time. COLONOSCOPY done 8/31/2018 per Dr. Malena Nielson- to follow up again in 8/2023  DEXA done 8/19/2019- to do again at this time. Attending Physician Statement  I have discussed the case, including pertinent history and exam findings with the resident. I also have seen the patient and performed key portions of the examination. I agree with the documented assessment and plan as documented by the resident.   GC modifier added  to this encounter     Electronically signed by Deanna Sotomayor MD on 12/21/2022 at 9:46 PM

## 2022-12-21 NOTE — PROGRESS NOTES
[address] [facility] [address] [address]  Dept: [phone]  Loc: [phone]      Please see Resident note for complete HPI. Here for rash. Comes and goes. Migrates. Targetoid lesion. Not there currently. Also having leg cramps. No symptoms of claudication. Cardiology evaluated for PVD. Cardiology recommended vein stripping. Hx of left breast cancer. S/p lumpectomy for lesion. Now having pulling on the right. Requesting plastic surgery to improve symmetry.      ROS per Resident    Lab Results   Component Value Date    WBC 6.5 04/05/2022    HGB 14.4 04/05/2022    HCT 43.6 04/05/2022    MCV 89.9 04/05/2022     04/05/2022     Lab Results   Component Value Date     04/05/2022    K 4.0 04/05/2022     04/05/2022    CO2 29 04/05/2022    BUN 11 04/05/2022    CREATININE 0.7 04/05/2022    GLUCOSE 85 04/05/2022    CALCIUM 9.4 04/05/2022    PROT 7.4 07/16/2021    LABALBU 4.2 07/16/2021    BILITOT 0.7 07/16/2021    ALKPHOS 90 07/16/2021    AST 18 07/16/2021    ALT 14 07/16/2021    LABGLOM 83 (A) 04/05/2022     Lab Results   Component Value Date    LABA1C 5.3 03/22/2021     No results found for: EAG  No results found for: LABMICR, [id number]  Lab Results   Component Value Date    TSH 2.530 12/08/2020     Lab Results   Component Value Date    CHOL 177 12/08/2020    CHOL 171 08/19/2019    CHOL 167 06/04/2018     Lab Results   Component Value Date    TRIG 88 12/08/2020    TRIG 78 08/19/2019    TRIG 81 06/04/2018     Lab Results   Component Value Date    HDL 63 12/08/2020    HDL 67 08/19/2019    HDL 71 06/04/2018     Lab Results   Component Value Date    LDLCALC 96 12/08/2020    [address] 08/19/2019    [address] 06/04/2018     No results found for: LABVLDL, VLDL  No results found for: CHOLHDLRATIO  No results found for: PSA, PSADIA    Health Maintenance Due   Topic Date Due    DTaP/Tdap/Td vaccine (1 - Tdap) Never done    COVID-19 Vaccine (3 - Booster for Pfizer series) 06/14/2021    Pneumococcal 65+ years Vaccine (2 - PPSV23 if available, else PCV20) 11/30/2021    Depression Screen  03/22/2022    Annual Wellness Visit (AWV)  03/23/2022    Flu vaccine (1) 08/01/2022    Breast cancer screen  09/15/2022         Physical Exam per Resident       ICD-10-CM    1. Rash  R21       2. Leg cramps  R25.2       3. Primary malignant neoplasm of left breast without evidence of distant metastasis ([facility] [address]  C50.912               Plan  I participated in the discussion and care of this patient   - Get lyme titers. Hold off on antibiotics as this time  - Use apple cider mixed with water for leg cramps at night.  Increase water intake  - Refer to Dr Timur Melissa for plastic surgery  - Recommend mammogram

## 2022-12-25 LAB — LYME DISEASE AB, QUANT, IGM: NEGATIVE

## 2022-12-27 ENCOUNTER — HOSPITAL ENCOUNTER (OUTPATIENT)
Age: 67
Discharge: HOME | End: 2022-12-27
Payer: MEDICARE

## 2022-12-27 DIAGNOSIS — I10 ESSENTIAL HYPERTENSION: ICD-10-CM

## 2022-12-27 DIAGNOSIS — Z13.220 LIPID SCREENING: ICD-10-CM

## 2022-12-27 DIAGNOSIS — E04.9 ENLARGED THYROID: ICD-10-CM

## 2022-12-27 LAB
ALBUMIN SERPL-MCNC: 4.1 G/DL (ref 3.5–5.1)
ALP BLD-CCNC: 121 U/L (ref 38–126)
ALT SERPL-CCNC: 12 U/L (ref 11–66)
ANION GAP SERPL CALCULATED.3IONS-SCNC: 11 MEQ/L (ref 8–16)
AST SERPL-CCNC: 16 U/L (ref 5–40)
BASOPHILS # BLD: 0.6 %
BASOPHILS ABSOLUTE: 0 THOU/MM3 (ref 0–0.1)
BILIRUB SERPL-MCNC: 0.6 MG/DL (ref 0.3–1.2)
BUN BLDV-MCNC: 7 MG/DL (ref 7–22)
CALCIUM SERPL-MCNC: 9.2 MG/DL (ref 8.5–10.5)
CHLORIDE BLD-SCNC: 101 MEQ/L (ref 98–111)
CHOLESTEROL, FASTING: 175 MG/DL (ref 100–199)
CO2: 28 MEQ/L (ref 23–33)
CREAT SERPL-MCNC: 0.6 MG/DL (ref 0.4–1.2)
EOSINOPHIL # BLD: 3.4 %
EOSINOPHILS ABSOLUTE: 0.2 THOU/MM3 (ref 0–0.4)
ERYTHROCYTE [DISTWIDTH] IN BLOOD BY AUTOMATED COUNT: 13.3 % (ref 11.5–14.5)
ERYTHROCYTE [DISTWIDTH] IN BLOOD BY AUTOMATED COUNT: 42.8 FL (ref 35–45)
GFR SERPL CREATININE-BSD FRML MDRD: > 60 ML/MIN/1.73M2
GLUCOSE BLD-MCNC: 99 MG/DL (ref 70–108)
HCT VFR BLD CALC: 43.7 % (ref 37–47)
HDLC SERPL-MCNC: 57 MG/DL
HEMOGLOBIN: 14.6 GM/DL (ref 12–16)
IMMATURE GRANS (ABS): 0.01 THOU/MM3 (ref 0–0.07)
IMMATURE GRANULOCYTES: 0.2 %
LDL CHOLESTEROL CALCULATED: 100 MG/DL
LYMPHOCYTES # BLD: 27.6 %
LYMPHOCYTES ABSOLUTE: 1.8 THOU/MM3 (ref 1–4.8)
MCH RBC QN AUTO: 29.4 PG (ref 26–33)
MCHC RBC AUTO-ENTMCNC: 33.4 GM/DL (ref 32.2–35.5)
MCV RBC AUTO: 88.1 FL (ref 81–99)
MONOCYTES # BLD: 8.4 %
MONOCYTES ABSOLUTE: 0.5 THOU/MM3 (ref 0.4–1.3)
NUCLEATED RED BLOOD CELLS: 0 /100 WBC
PLATELET # BLD: 356 THOU/MM3 (ref 130–400)
PMV BLD AUTO: 9.1 FL (ref 9.4–12.4)
POTASSIUM SERPL-SCNC: 4 MEQ/L (ref 3.5–5.2)
RBC # BLD: 4.96 MILL/MM3 (ref 4.2–5.4)
SEG NEUTROPHILS: 59.8 %
SEGMENTED NEUTROPHILS ABSOLUTE COUNT: 3.9 THOU/MM3 (ref 1.8–7.7)
SODIUM BLD-SCNC: 140 MEQ/L (ref 135–145)
TOTAL PROTEIN: 6.9 G/DL (ref 6.1–8)
TRIGLYCERIDE, FASTING: 90 MG/DL (ref 0–199)
TSH SERPL DL<=0.05 MIU/L-ACNC: 3 UIU/ML (ref 0.4–4.2)
WBC # BLD: 6.5 THOU/MM3 (ref 4.8–10.8)

## 2022-12-27 PROCEDURE — 80061 LIPID PANEL: CPT

## 2022-12-27 PROCEDURE — 80053 COMPREHEN METABOLIC PANEL: CPT

## 2022-12-27 PROCEDURE — 85025 COMPLETE CBC W/AUTO DIFF WBC: CPT

## 2022-12-27 PROCEDURE — 84443 ASSAY THYROID STIM HORMONE: CPT

## 2022-12-27 PROCEDURE — 36415 COLL VENOUS BLD VENIPUNCTURE: CPT

## 2023-01-09 ENCOUNTER — HOSPITAL ENCOUNTER (OUTPATIENT)
Dept: WOMENS IMAGING | Age: 68
Discharge: HOME | End: 2023-01-09
Payer: MEDICARE

## 2023-01-09 DIAGNOSIS — M85.88 OSTEOPENIA OF LUMBAR SPINE: ICD-10-CM

## 2023-01-09 DIAGNOSIS — Z12.31 VISIT FOR SCREENING MAMMOGRAM: ICD-10-CM

## 2023-01-09 PROCEDURE — 77080 DXA BONE DENSITY AXIAL: CPT

## 2023-01-09 PROCEDURE — 77067 SCR MAMMO BI INCL CAD: CPT

## 2023-01-11 ENCOUNTER — TELEPHONE (OUTPATIENT)
Dept: FAMILY MEDICINE CLINIC | Age: 68
End: 2023-01-11

## 2023-01-11 NOTE — TELEPHONE ENCOUNTER
----- Message from Dahlia Olsen MD sent at 1/10/2023  9:07 AM EST -----  Thank you for getting your bone density completed. Hip and spine results are stable. Continue with weightbearing exercise as tolerated.   Hope all is well and thank you,  Electronically signed by Donna Frey MD on 1/10/2023 at 9:05 AM

## 2023-07-14 ENCOUNTER — OFFICE VISIT (OUTPATIENT)
Dept: FAMILY MEDICINE CLINIC | Age: 68
End: 2023-07-14
Payer: MEDICARE

## 2023-07-14 VITALS
DIASTOLIC BLOOD PRESSURE: 60 MMHG | WEIGHT: 180.2 LBS | OXYGEN SATURATION: 98 % | TEMPERATURE: 97.7 F | HEART RATE: 64 BPM | RESPIRATION RATE: 16 BRPM | SYSTOLIC BLOOD PRESSURE: 114 MMHG | HEIGHT: 61 IN | BODY MASS INDEX: 34.02 KG/M2

## 2023-07-14 DIAGNOSIS — M47.26 OSTEOARTHRITIS OF SPINE WITH RADICULOPATHY, LUMBAR REGION: ICD-10-CM

## 2023-07-14 DIAGNOSIS — I10 ESSENTIAL HYPERTENSION: Primary | ICD-10-CM

## 2023-07-14 DIAGNOSIS — M51.9 LUMBAR DISC DISEASE: ICD-10-CM

## 2023-07-14 DIAGNOSIS — M25.551 BILATERAL HIP PAIN: ICD-10-CM

## 2023-07-14 DIAGNOSIS — J43.8 OTHER EMPHYSEMA (HCC): ICD-10-CM

## 2023-07-14 DIAGNOSIS — M25.552 BILATERAL HIP PAIN: ICD-10-CM

## 2023-07-14 PROCEDURE — 3078F DIAST BP <80 MM HG: CPT | Performed by: STUDENT IN AN ORGANIZED HEALTH CARE EDUCATION/TRAINING PROGRAM

## 2023-07-14 PROCEDURE — 99214 OFFICE O/P EST MOD 30 MIN: CPT | Performed by: STUDENT IN AN ORGANIZED HEALTH CARE EDUCATION/TRAINING PROGRAM

## 2023-07-14 PROCEDURE — 3074F SYST BP LT 130 MM HG: CPT | Performed by: STUDENT IN AN ORGANIZED HEALTH CARE EDUCATION/TRAINING PROGRAM

## 2023-07-14 PROCEDURE — 1123F ACP DISCUSS/DSCN MKR DOCD: CPT | Performed by: STUDENT IN AN ORGANIZED HEALTH CARE EDUCATION/TRAINING PROGRAM

## 2023-07-14 RX ORDER — MELOXICAM 7.5 MG/1
7.5 TABLET ORAL DAILY
Qty: 30 TABLET | Refills: 3 | Status: SHIPPED | OUTPATIENT
Start: 2023-07-14

## 2023-07-14 SDOH — ECONOMIC STABILITY: FOOD INSECURITY: WITHIN THE PAST 12 MONTHS, YOU WORRIED THAT YOUR FOOD WOULD RUN OUT BEFORE YOU GOT MONEY TO BUY MORE.: NEVER TRUE

## 2023-07-14 SDOH — ECONOMIC STABILITY: HOUSING INSECURITY
IN THE LAST 12 MONTHS, WAS THERE A TIME WHEN YOU DID NOT HAVE A STEADY PLACE TO SLEEP OR SLEPT IN A SHELTER (INCLUDING NOW)?: NO

## 2023-07-14 SDOH — ECONOMIC STABILITY: INCOME INSECURITY: HOW HARD IS IT FOR YOU TO PAY FOR THE VERY BASICS LIKE FOOD, HOUSING, MEDICAL CARE, AND HEATING?: NOT VERY HARD

## 2023-07-14 SDOH — ECONOMIC STABILITY: FOOD INSECURITY: WITHIN THE PAST 12 MONTHS, THE FOOD YOU BOUGHT JUST DIDN'T LAST AND YOU DIDN'T HAVE MONEY TO GET MORE.: NEVER TRUE

## 2023-07-14 ASSESSMENT — PATIENT HEALTH QUESTIONNAIRE - PHQ9
SUM OF ALL RESPONSES TO PHQ QUESTIONS 1-9: 0
SUM OF ALL RESPONSES TO PHQ QUESTIONS 1-9: 0
2. FEELING DOWN, DEPRESSED OR HOPELESS: 0
SUM OF ALL RESPONSES TO PHQ QUESTIONS 1-9: 0
SUM OF ALL RESPONSES TO PHQ9 QUESTIONS 1 & 2: 0
1. LITTLE INTEREST OR PLEASURE IN DOING THINGS: 0
SUM OF ALL RESPONSES TO PHQ QUESTIONS 1-9: 0

## 2023-07-14 NOTE — PROGRESS NOTES
Health Maintenance Due   Topic Date Due    DTaP/Tdap/Td vaccine (1 - Tdap) Never done    Pneumococcal 65+ years Vaccine (2 - PPSV23 if available, else PCV20) 01/25/2021    COVID-19 Vaccine (3 - Booster for [employer] series) 06/14/2021    Annual Wellness Visit (AWV)  03/23/2022

## 2023-07-14 NOTE — PROGRESS NOTES
[address] [facility] [address] [place] [address]  Dept: [phone]  Loc: [address] (:  1955) is a 70 y.o. female,Established patient, here for evaluation of the following chief complaint(s):  6 Month Follow-Up      ASSESSMENT/PLAN:  1. Essential hypertension  2. Lumbar disc disease  -     XR HIP LEFT (2-3 VIEWS); Future  -     XR HIP RIGHT (2-3 VIEWS); Future  -     meloxicam (MOBIC) 7.5 MG tablet; Take 1 tablet by mouth daily, Disp-30 tablet, R-3Normal  -     XR LUMBAR SPINE (MIN 6 VIEWS); Future  3. Osteoarthritis of spine with radiculopathy, lumbar region  -     XR LUMBAR SPINE (MIN 6 VIEWS); Future  4. Bilateral hip pain  -     XR HIP LEFT (2-3 VIEWS); Future  -     XR HIP RIGHT (2-3 VIEWS); Future  -     meloxicam (MOBIC) 7.5 MG tablet; Take 1 tablet by mouth daily, Disp-30 tablet, R-3Normal  5. Other emphysema ([address])    Essential hypertension: Chronic, stable  Blood pressure 114/60 in office. Continue hydrochlorothiazide 25 mg daily, losartan 50 mg daily    Lumbar disc disease with osteoarthritis of the lower spine: Chronic, worsening  Lumbar spine x-rays from 3/24/2021 demonstrate degenerative changes of the lower back  Trial meloxicam 7.5 mg daily may increase to 50 mg daily as needed  Obtain x-rays of lower spine with obliques  Continue yoga exercise, lower back and hip exercises and stretching information provided. May consider physical therapy if needed. 1 month follow-up    Bilateral hip pain left worse than right: Noted roughly 2-3 months ago and worsening  Pain wraps from left hip to left groin and down left leg with cramping in feet bilaterally  Trial Mobic as above, bilateral hip x-rays  Hip exercises and stretching information provided  \"Dislocation of left hip with birth second child, total of 4 children.   Follow-up in 1 month to discuss imaging and symptoms    Emphysema, chronic,

## 2023-07-15 NOTE — PROGRESS NOTES
Attending Physician Statement  I have discussed the case, including pertinent history and exam findings with the resident. I also have seen the patient and performed key portions of the examination. I agree with the documented assessment and plan as documented by the resident.   GC modifier added to this encounter      Ashanti Neff MD 7/15/2023 12:13 PM

## 2023-07-24 ENCOUNTER — HOSPITAL ENCOUNTER (OUTPATIENT)
Dept: GENERAL RADIOLOGY | Age: 68
Discharge: HOME | End: 2023-07-24
Payer: MEDICARE

## 2023-07-24 ENCOUNTER — HOSPITAL ENCOUNTER (OUTPATIENT)
Age: 68
Discharge: HOME | End: 2023-07-24
Payer: MEDICARE

## 2023-07-24 DIAGNOSIS — M51.9 LUMBAR DISC DISEASE: ICD-10-CM

## 2023-07-24 DIAGNOSIS — M25.552 BILATERAL HIP PAIN: ICD-10-CM

## 2023-07-24 DIAGNOSIS — M25.551 BILATERAL HIP PAIN: ICD-10-CM

## 2023-07-24 DIAGNOSIS — M47.26 OSTEOARTHRITIS OF SPINE WITH RADICULOPATHY, LUMBAR REGION: ICD-10-CM

## 2023-07-24 PROCEDURE — 72114 X-RAY EXAM L-S SPINE BENDING: CPT

## 2023-07-24 PROCEDURE — 73502 X-RAY EXAM HIP UNI 2-3 VIEWS: CPT

## 2023-07-27 ENCOUNTER — TELEPHONE (OUTPATIENT)
Dept: FAMILY MEDICINE CLINIC | Age: 68
End: 2023-07-27

## 2023-07-27 NOTE — TELEPHONE ENCOUNTER
----- Message from Ana Lowery MD sent at 7/25/2023  1:15 PM EDT -----  Moderate degenerative changes on x-ray of lower back. We will discuss in more detail at upcoming visit.    Thank you,  Electronically signed by Alexia Black MD on 7/25/2023 at 1:15 PM

## 2023-07-27 NOTE — TELEPHONE ENCOUNTER
----- Message from Kavon Alejandro MD sent at 7/25/2023  1:10 PM EDT -----  No degenerative changes reported on x-ray of hips. Can discuss in more detail at upcomming apts.     Thank you,  Electronically signed by Babak Suero MD on 7/25/2023 at 1:10 PM

## 2023-08-15 ENCOUNTER — OFFICE VISIT (OUTPATIENT)
Dept: FAMILY MEDICINE CLINIC | Age: 68
End: 2023-08-15
Payer: MEDICARE

## 2023-08-15 VITALS
RESPIRATION RATE: 16 BRPM | TEMPERATURE: 97.8 F | BODY MASS INDEX: 34.7 KG/M2 | HEART RATE: 59 BPM | DIASTOLIC BLOOD PRESSURE: 62 MMHG | WEIGHT: 183.8 LBS | HEIGHT: 61 IN | SYSTOLIC BLOOD PRESSURE: 112 MMHG | OXYGEN SATURATION: 98 %

## 2023-08-15 DIAGNOSIS — I10 PRIMARY HYPERTENSION: Primary | ICD-10-CM

## 2023-08-15 DIAGNOSIS — J43.8 OTHER EMPHYSEMA (HCC): ICD-10-CM

## 2023-08-15 DIAGNOSIS — M47.26 OSTEOARTHRITIS OF SPINE WITH RADICULOPATHY, LUMBAR REGION: ICD-10-CM

## 2023-08-15 DIAGNOSIS — M51.9 LUMBAR DISC DISEASE: ICD-10-CM

## 2023-08-15 DIAGNOSIS — M25.551 BILATERAL HIP PAIN: ICD-10-CM

## 2023-08-15 DIAGNOSIS — M25.552 BILATERAL HIP PAIN: ICD-10-CM

## 2023-08-15 PROCEDURE — 99214 OFFICE O/P EST MOD 30 MIN: CPT | Performed by: STUDENT IN AN ORGANIZED HEALTH CARE EDUCATION/TRAINING PROGRAM

## 2023-08-15 PROCEDURE — 3074F SYST BP LT 130 MM HG: CPT | Performed by: STUDENT IN AN ORGANIZED HEALTH CARE EDUCATION/TRAINING PROGRAM

## 2023-08-15 PROCEDURE — 1123F ACP DISCUSS/DSCN MKR DOCD: CPT | Performed by: STUDENT IN AN ORGANIZED HEALTH CARE EDUCATION/TRAINING PROGRAM

## 2023-08-15 PROCEDURE — 3078F DIAST BP <80 MM HG: CPT | Performed by: STUDENT IN AN ORGANIZED HEALTH CARE EDUCATION/TRAINING PROGRAM

## 2023-08-15 RX ORDER — MELOXICAM 7.5 MG/1
7.5 TABLET ORAL 2 TIMES DAILY
Qty: 180 TABLET | Refills: 3 | Status: SHIPPED | OUTPATIENT
Start: 2023-08-15

## 2023-08-15 NOTE — PROGRESS NOTES
[address] [facility] [address] [place] [address]  Dept: [phone]  Loc: [address] (:  1955) is a 70 y.o. female,Established patient, here for evaluation of the following chief complaint(s):  1 Month Follow-Up      ASSESSMENT/PLAN:  1. Primary hypertension  2. Lumbar disc disease  -     meloxicam (MOBIC) 7.5 MG tablet; Take 1 tablet by mouth in the morning and at bedtime, Disp-180 tablet, R-3Normal  3. Bilateral hip pain  -     meloxicam (MOBIC) 7.5 MG tablet; Take 1 tablet by mouth in the morning and at bedtime, Disp-180 tablet, R-3Normal  4. Osteoarthritis of spine with radiculopathy, lumbar region  5. Other emphysema ([address])    Essential hypertension: Chronic, stable  Blood pressure 112/62 in office. Improved diet  Has not had BP meds in 3 days. Stop losartan 50mg and continue HCTZ 25 daily for fluid retention. No hx of heart disease. Consider discontinuing HCTZ in future if BP remains stable. Lumbar disc disease with osteoarthritis of the lower spine: Chronic, improved with yoga and meloxicam.  Imaging demonstrates preserved vertebral body heights, no compression fracture no subluxation however does show disc space narrowing most notable of L4/5 and L5/S1. Continue meloxicam 7.5 mg daily may increase to 15 mg daily as needed  Continue yoga exercise, lower back and hip exercises and stretching information provided. May consider physical therapy if needed. Bilateral hip pain likely secondary to spine, x-rays unremarkable. Pain wraps from left hip to left groin and down left leg with cramping in feet bilaterally  Mobic as above, bilateral hip x-rays  Hip exercises and stretching information provided  \"Dislocation of left hip with birth second child,\" total of 4 children.      Emphysema, chronic, stable  Secondary to chemotherapy and radiation for breast cancer  Patient asymptomatic no longer

## 2023-08-15 NOTE — PROGRESS NOTES
Health Maintenance Due   Topic Date Due    DTaP/Tdap/Td vaccine (1 - Tdap) Never done    Pneumococcal 65+ years Vaccine (2 - PPSV23 if available, else PCV20) 01/25/2021    COVID-19 Vaccine (3 - Booster for [place] series) 06/14/2021    Annual Wellness Visit (AWV)  03/23/2022    Flu vaccine (1) 08/01/2023

## 2023-09-14 ENCOUNTER — OFFICE VISIT (OUTPATIENT)
Dept: FAMILY MEDICINE CLINIC | Age: 68
End: 2023-09-14

## 2023-09-14 VITALS
HEART RATE: 64 BPM | SYSTOLIC BLOOD PRESSURE: 169 MMHG | WEIGHT: 185.4 LBS | BODY MASS INDEX: 35.03 KG/M2 | DIASTOLIC BLOOD PRESSURE: 77 MMHG

## 2023-09-14 DIAGNOSIS — Z00.00 MEDICARE ANNUAL WELLNESS VISIT, SUBSEQUENT: Primary | ICD-10-CM

## 2023-09-14 RX ORDER — MULTIVIT-MIN/IRON/FOLIC ACID/K 18-600-40
3 CAPSULE ORAL DAILY
COMMUNITY

## 2023-09-14 RX ORDER — ALBUTEROL SULFATE 90 UG/1
2 AEROSOL, METERED RESPIRATORY (INHALATION) EVERY 6 HOURS PRN
COMMUNITY

## 2023-09-14 RX ORDER — M-VIT,TX,IRON,MINS/CALC/FOLIC 27MG-0.4MG
1 TABLET ORAL DAILY
COMMUNITY

## 2023-09-14 ASSESSMENT — LIFESTYLE VARIABLES
HOW OFTEN DO YOU HAVE A DRINK CONTAINING ALCOHOL: MONTHLY OR LESS
HOW MANY STANDARD DRINKS CONTAINING ALCOHOL DO YOU HAVE ON A TYPICAL DAY: 1 OR 2

## 2023-09-14 ASSESSMENT — PATIENT HEALTH QUESTIONNAIRE - PHQ9
SUM OF ALL RESPONSES TO PHQ QUESTIONS 1-9: 0
SUM OF ALL RESPONSES TO PHQ QUESTIONS 1-9: 0
SUM OF ALL RESPONSES TO PHQ9 QUESTIONS 1 & 2: 0
SUM OF ALL RESPONSES TO PHQ QUESTIONS 1-9: 0
SUM OF ALL RESPONSES TO PHQ QUESTIONS 1-9: 0
1. LITTLE INTEREST OR PLEASURE IN DOING THINGS: 0
2. FEELING DOWN, DEPRESSED OR HOPELESS: 0

## 2023-09-14 NOTE — PATIENT INSTRUCTIONS
1) Restart losartan 50 mg tablets with instructions to take 1/2 of a tablet (25 mg) by mouth once daily for high blood pressure    2) Remember to get Tdap booster, second pneumonia vaccine (PCV20), and encouraged to get annual high-dose flu vaccine (FLUAD)   Personalized Preventive Plan for Franky Beebe - 9/14/2023  Medicare offers a range of preventive health benefits. Some of the tests and screenings are paid in full while other may be subject to a deductible, co-insurance, and/or copay. Some of these benefits include a comprehensive review of your medical history including lifestyle, illnesses that may run in your family, and various assessments and screenings as appropriate. After reviewing your medical record and screening and assessments performed today your provider may have ordered immunizations, labs, imaging, and/or referrals for you. A list of these orders (if applicable) as well as your Preventive Care list are included within your After Visit Summary for your review. Other Preventive Recommendations:    A preventive eye exam performed by an eye specialist is recommended every 1-2 years to screen for glaucoma; cataracts, macular degeneration, and other eye disorders. A preventive dental visit is recommended every 6 months. Try to get at least 150 minutes of exercise per week or 10,000 steps per day on a pedometer . Order or download the FREE \"Exercise & Physical Activity: Your Everyday Guide\" from [employer]. Call 5-880.573.1648 or search [employer] online. You need 1865-9894 mg of calcium and 4472-3807 IU of vitamin D per day. It is possible to meet your calcium requirement with diet alone, but a vitamin D supplement is usually necessary to meet this goal.  When exposed to the sun, use a sunscreen that protects against both UVA and UVB radiation with an SPF of 30 or greater.  Reapply every 2 to 3 hours or after sweating, drying off with a towel,

## 2023-11-07 ENCOUNTER — OFFICE VISIT (OUTPATIENT)
Dept: FAMILY MEDICINE CLINIC | Age: 68
End: 2023-11-07

## 2023-11-07 VITALS
WEIGHT: 188 LBS | HEIGHT: 61 IN | OXYGEN SATURATION: 97 % | TEMPERATURE: 98.2 F | SYSTOLIC BLOOD PRESSURE: 130 MMHG | BODY MASS INDEX: 35.5 KG/M2 | HEART RATE: 66 BPM | RESPIRATION RATE: 14 BRPM | DIASTOLIC BLOOD PRESSURE: 92 MMHG

## 2023-11-07 DIAGNOSIS — I10 PRIMARY HYPERTENSION: Primary | ICD-10-CM

## 2023-11-07 DIAGNOSIS — Z13.220 LIPID SCREENING: ICD-10-CM

## 2023-11-07 DIAGNOSIS — R25.2 CRAMPS, EXTREMITY: ICD-10-CM

## 2023-11-07 DIAGNOSIS — E66.9 CLASS 2 OBESITY WITH BODY MASS INDEX (BMI) OF 35.0 TO 35.9 IN ADULT, UNSPECIFIED OBESITY TYPE, UNSPECIFIED WHETHER SERIOUS COMORBIDITY PRESENT: ICD-10-CM

## 2023-11-07 DIAGNOSIS — M25.552 BILATERAL HIP PAIN: ICD-10-CM

## 2023-11-07 DIAGNOSIS — M25.551 BILATERAL HIP PAIN: ICD-10-CM

## 2023-11-07 DIAGNOSIS — M51.9 LUMBAR DISC DISEASE: ICD-10-CM

## 2023-11-07 DIAGNOSIS — M47.26 OSTEOARTHRITIS OF SPINE WITH RADICULOPATHY, LUMBAR REGION: ICD-10-CM

## 2023-11-07 NOTE — PROGRESS NOTES
[address] [facility] [address] [place] [id number]  Dept: [phone]  Loc: [address] (:  1955) is a 70 y.o. female,Established patient, here for evaluation of the following chief complaint(s):  Follow-up (Discuss blood pressure )      ASSESSMENT/PLAN:  1. Primary hypertension  -     Comprehensive Metabolic Panel; Future  2. Osteoarthritis of spine with radiculopathy, lumbar region  -     diclofenac sodium (VOLTAREN) 1 % GEL; Apply 4 g topically 4 times daily, Topical, 4 TIMES DAILY Starting 2023, Disp-350 g, R-1, Normal  3. Lumbar disc disease  -     diclofenac sodium (VOLTAREN) 1 % GEL; Apply 4 g topically 4 times daily, Topical, 4 TIMES DAILY Starting 2023, Disp-350 g, R-1, Normal  4. Bilateral hip pain  -     diclofenac sodium (VOLTAREN) 1 % GEL; Apply 4 g topically 4 times daily, Topical, 4 TIMES DAILY Starting 2023, Disp-350 g, R-1, Normal  5. Cramps, extremity  -     Comprehensive Metabolic Panel; Future  -     CBC with Auto Differential; Future  6. Lipid screening  -     Lipid Panel; Future  7. Class 2 obesity with body mass index (BMI) of 35.0 to 35.9 in adult, unspecified obesity type, unspecified whether serious comorbidity present  -     Lipid Panel; Future    Essential hypertension: was very well controled last visit and trialed off losartan. Had elevation in BP and restarted losartan with f/u today. Lots of stress in life and heavy use of NSIADs. Blood pressure 130/92 in office. Improved diet. Continue losartan 50 mg HCTZ 25 daily for fluid retention. No hx of heart disease. Limit/stop NSAID use.     Lumbar disc disease with osteoarthritis of the lower spine: Chronic, improved with yoga and meloxicam.  Imaging demonstrates preserved vertebral body heights, no compression fracture no subluxation however does show disc space narrowing most notable of L4/5 and

## 2023-11-07 NOTE — PATIENT INSTRUCTIONS
Can talk to Slime Felipe about statin with the following.    The 10-year ASCVD risk score (Dayne FIGUEROA, et al., 2019) is: 11.1%    Values used to calculate the score:      Age: 70 years      Sex: Female      Is Non- : No      Diabetic: No      Tobacco smoker: No      Systolic Blood Pressure: 679 mmHg      Is BP treated: Yes      HDL Cholesterol: 57 mg/dL      Total Cholesterol: 175 mg/dl

## 2024-01-23 DIAGNOSIS — I10 ESSENTIAL HYPERTENSION: ICD-10-CM

## 2024-01-23 RX ORDER — HYDROCHLOROTHIAZIDE 25 MG/1
25 TABLET ORAL EVERY MORNING
Qty: 90 TABLET | Refills: 3 | Status: SHIPPED | OUTPATIENT
Start: 2024-01-23

## 2024-01-23 RX ORDER — LOSARTAN POTASSIUM 50 MG/1
50 TABLET ORAL DAILY
Qty: 90 TABLET | Refills: 3 | Status: SHIPPED | OUTPATIENT
Start: 2024-01-23

## 2024-01-23 NOTE — TELEPHONE ENCOUNTER
Please have patient obtain labs prior to upcoming appointment in February.   Script sent to pharmacy.  Thank you,  Electronically signed by Marshall Nazario MD on 1/23/2024 at 3:46 PM

## 2024-01-23 NOTE — TELEPHONE ENCOUNTER
Patient's last appointment was : 11/7/2023 with our   Patient's next appointment is : 2/8/2024 with our Dr. Nazario  Last refilled on: 4-13-23  Which pharmacy does the script need sent to: [facility] [place]      Lab Results   Component Value Date    LABA1C 5.3 03/22/2021     Lab Results   Component Value Date    CHOL 177 12/08/2020    TRIG 88 12/08/2020    HDL 57 12/27/2022    LDLCALC 100 12/27/2022     Lab Results   Component Value Date     12/27/2022    K 4.0 12/27/2022     12/27/2022    CO2 28 12/27/2022    BUN 7 12/27/2022    CREATININE 0.6 12/27/2022    GLUCOSE 99 12/27/2022    CALCIUM 9.2 12/27/2022    PROT 6.9 12/27/2022    LABALBU 4.1 12/27/2022    BILITOT 0.6 12/27/2022    ALKPHOS 121 12/27/2022    AST 16 12/27/2022    ALT 12 12/27/2022    LABGLOM >60 12/27/2022     Lab Results   Component Value Date    TSH 3.000 12/27/2022     Lab Results   Component Value Date    WBC 6.5 12/27/2022    HGB 14.6 12/27/2022    HCT 43.7 12/27/2022    MCV 88.1 12/27/2022     12/27/2022

## 2024-02-02 ENCOUNTER — HOSPITAL ENCOUNTER (OUTPATIENT)
Age: 69
Discharge: HOME | End: 2024-02-02
Payer: MEDICARE

## 2024-02-02 DIAGNOSIS — Z13.220 LIPID SCREENING: ICD-10-CM

## 2024-02-02 DIAGNOSIS — R25.2 CRAMPS, EXTREMITY: ICD-10-CM

## 2024-02-02 DIAGNOSIS — I10 PRIMARY HYPERTENSION: ICD-10-CM

## 2024-02-02 DIAGNOSIS — E66.9 CLASS 2 OBESITY WITH BODY MASS INDEX (BMI) OF 35.0 TO 35.9 IN ADULT, UNSPECIFIED OBESITY TYPE, UNSPECIFIED WHETHER SERIOUS COMORBIDITY PRESENT: ICD-10-CM

## 2024-02-02 LAB
ALBUMIN SERPL BCG-MCNC: 4.3 G/DL (ref 3.5–5.1)
ALP SERPL-CCNC: 93 U/L (ref 38–126)
ALT SERPL W/O P-5'-P-CCNC: 12 U/L (ref 11–66)
ANION GAP SERPL CALC-SCNC: 12 MEQ/L (ref 8–16)
AST SERPL-CCNC: 15 U/L (ref 5–40)
BASOPHILS ABSOLUTE: 0.1 THOU/MM3 (ref 0–0.1)
BASOPHILS NFR BLD AUTO: 1.7 %
BILIRUB SERPL-MCNC: 0.5 MG/DL (ref 0.3–1.2)
BUN SERPL-MCNC: 11 MG/DL (ref 7–22)
CALCIUM SERPL-MCNC: 9 MG/DL (ref 8.5–10.5)
CHLORIDE SERPL-SCNC: 101 MEQ/L (ref 98–111)
CHOLEST SERPL-MCNC: 162 MG/DL (ref 100–199)
CO2 SERPL-SCNC: 30 MEQ/L (ref 23–33)
CREAT SERPL-MCNC: 0.5 MG/DL (ref 0.4–1.2)
DEPRECATED RDW RBC AUTO: 43.2 FL (ref 35–45)
EOSINOPHIL NFR BLD AUTO: 3.1 %
EOSINOPHILS ABSOLUTE: 0.2 THOU/MM3 (ref 0–0.4)
ERYTHROCYTE [DISTWIDTH] IN BLOOD BY AUTOMATED COUNT: 13.1 % (ref 11.5–14.5)
GFR SERPL CREATININE-BSD FRML MDRD: > 60 ML/MIN/1.73M2
GLUCOSE SERPL-MCNC: 116 MG/DL (ref 70–108)
HCT VFR BLD AUTO: 42.8 % (ref 37–47)
HDLC SERPL-MCNC: 59 MG/DL
HGB BLD-MCNC: 14 GM/DL (ref 12–16)
IMM GRANULOCYTES # BLD AUTO: 0.01 THOU/MM3 (ref 0–0.07)
IMM GRANULOCYTES NFR BLD AUTO: 0.2 %
LDLC SERPL CALC-MCNC: 85 MG/DL
LYMPHOCYTES ABSOLUTE: 2 THOU/MM3 (ref 1–4.8)
LYMPHOCYTES NFR BLD AUTO: 32.7 %
MCH RBC QN AUTO: 29.4 PG (ref 26–33)
MCHC RBC AUTO-ENTMCNC: 32.7 GM/DL (ref 32.2–35.5)
MCV RBC AUTO: 89.7 FL (ref 81–99)
MONOCYTES ABSOLUTE: 0.7 THOU/MM3 (ref 0.4–1.3)
MONOCYTES NFR BLD AUTO: 11.6 %
NEUTROPHILS NFR BLD AUTO: 50.7 %
NRBC BLD AUTO-RTO: 0 /100 WBC
PLATELET # BLD AUTO: 279 THOU/MM3 (ref 130–400)
PMV BLD AUTO: 9.4 FL (ref 9.4–12.4)
POTASSIUM SERPL-SCNC: 3.4 MEQ/L (ref 3.5–5.2)
PROT SERPL-MCNC: 7.1 G/DL (ref 6.1–8)
RBC # BLD AUTO: 4.77 MILL/MM3 (ref 4.2–5.4)
SEGMENTED NEUTROPHILS ABSOLUTE COUNT: 3.1 THOU/MM3 (ref 1.8–7.7)
SODIUM SERPL-SCNC: 143 MEQ/L (ref 135–145)
TRIGL SERPL-MCNC: 88 MG/DL (ref 0–199)
WBC # BLD AUTO: 6.1 THOU/MM3 (ref 4.8–10.8)

## 2024-02-02 PROCEDURE — 80053 COMPREHEN METABOLIC PANEL: CPT

## 2024-02-02 PROCEDURE — 85025 COMPLETE CBC W/AUTO DIFF WBC: CPT

## 2024-02-02 PROCEDURE — 80061 LIPID PANEL: CPT

## 2024-02-02 PROCEDURE — 36415 COLL VENOUS BLD VENIPUNCTURE: CPT

## 2024-02-05 ENCOUNTER — TELEPHONE (OUTPATIENT)
Dept: FAMILY MEDICINE CLINIC | Age: 69
End: 2024-02-05

## 2024-02-05 NOTE — TELEPHONE ENCOUNTER
----- Message from Marshall Nazario MD sent at 2/5/2024  1:17 PM EST -----  Will discuss labs at upcoming appointment. Very slight decrease in potassium, can increase potassium rich foods including bananas, apples, lentils, beans, starchy vegetables, carrots  otherwise labs stable.   Thank you,  Electronically signed by Marshall Nazario MD on 2/5/2024 at 1:14 PM

## 2024-02-08 ENCOUNTER — OFFICE VISIT (OUTPATIENT)
Dept: FAMILY MEDICINE CLINIC | Age: 69
End: 2024-02-08
Payer: MEDICARE

## 2024-02-08 VITALS
TEMPERATURE: 97.7 F | HEART RATE: 58 BPM | RESPIRATION RATE: 21 BRPM | HEIGHT: 61 IN | WEIGHT: 189.6 LBS | DIASTOLIC BLOOD PRESSURE: 86 MMHG | OXYGEN SATURATION: 98 % | SYSTOLIC BLOOD PRESSURE: 130 MMHG | BODY MASS INDEX: 35.8 KG/M2

## 2024-02-08 DIAGNOSIS — G25.81 RESTLESS LEGS: ICD-10-CM

## 2024-02-08 DIAGNOSIS — M25.552 BILATERAL HIP PAIN: ICD-10-CM

## 2024-02-08 DIAGNOSIS — C50.912: ICD-10-CM

## 2024-02-08 DIAGNOSIS — E04.9 ENLARGED THYROID: ICD-10-CM

## 2024-02-08 DIAGNOSIS — E87.6 HYPOKALEMIA: ICD-10-CM

## 2024-02-08 DIAGNOSIS — M25.551 BILATERAL HIP PAIN: ICD-10-CM

## 2024-02-08 DIAGNOSIS — M51.9 LUMBAR DISC DISEASE: ICD-10-CM

## 2024-02-08 DIAGNOSIS — E66.01 SEVERE OBESITY (BMI 35.0-39.9) WITH COMORBIDITY (HCC): ICD-10-CM

## 2024-02-08 DIAGNOSIS — M47.26 OSTEOARTHRITIS OF SPINE WITH RADICULOPATHY, LUMBAR REGION: ICD-10-CM

## 2024-02-08 DIAGNOSIS — I10 ESSENTIAL HYPERTENSION: Primary | ICD-10-CM

## 2024-02-08 DIAGNOSIS — J43.8 OTHER EMPHYSEMA (HCC): ICD-10-CM

## 2024-02-08 PROCEDURE — 99214 OFFICE O/P EST MOD 30 MIN: CPT | Performed by: STUDENT IN AN ORGANIZED HEALTH CARE EDUCATION/TRAINING PROGRAM

## 2024-02-08 PROCEDURE — 3075F SYST BP GE 130 - 139MM HG: CPT | Performed by: STUDENT IN AN ORGANIZED HEALTH CARE EDUCATION/TRAINING PROGRAM

## 2024-02-08 PROCEDURE — 3079F DIAST BP 80-89 MM HG: CPT | Performed by: STUDENT IN AN ORGANIZED HEALTH CARE EDUCATION/TRAINING PROGRAM

## 2024-02-08 PROCEDURE — 1123F ACP DISCUSS/DSCN MKR DOCD: CPT | Performed by: STUDENT IN AN ORGANIZED HEALTH CARE EDUCATION/TRAINING PROGRAM

## 2024-02-08 RX ORDER — LOSARTAN POTASSIUM AND HYDROCHLOROTHIAZIDE 12.5; 5 MG/1; MG/1
1 TABLET ORAL DAILY
Qty: 30 TABLET | Refills: 3 | Status: SHIPPED | OUTPATIENT
Start: 2024-02-08

## 2024-02-08 NOTE — PROGRESS NOTES
[facility] PROFESSIONAL [facility]  [address]  [place] [id number]  Dept: [phone]  Loc: [phone]      Petra Felix (:  1955) is a 68 y.o. female,Established patient, here for evaluation of the following chief complaint(s):  Follow-up (htn)      ASSESSMENT/PLAN:  1. Essential hypertension  -     Basic Metabolic Panel; Future  -     Magnesium; Future  -     losartan-hydroCHLOROthiazide (HYZAAR) 50-12.5 MG per tablet; Take 1 tablet by mouth daily, Disp-30 tablet, R-3Normal  2. Severe obesity (BMI 35.0-39.9) with comorbidity (HCC)  3. Other emphysema (HCC)  4. Primary malignant neoplasm of left breast without evidence of distant metastasis (HCC)  5. Hypokalemia  -     Basic Metabolic Panel; Future  -     Magnesium; Future  6. Bilateral hip pain  -     Ferritin; Future  7. Osteoarthritis of spine with radiculopathy, lumbar region  8. Lumbar disc disease  9. Enlarged thyroid  -     TSH With Reflex Ft4; Future  10. Restless legs    /86 in office, Change htn medication to Hyzaar 50-12.5 with daily BP log  Stop taking black licoreice   Repeat labs with magnesium.  Continue off Mobic but ok to Trial voltaren gel  Continue home exercises/Yoga and home PT help with lower back.   Lower back pain exercises provided.   Potassium rich diet.  Ferritin in setting of chronic back/hip pain and restless legs  Repeat TSH, previous normal.  Stopped taking albuterol as no longer needing, patient to call if sx and/or needing albuterol again.    Return in about 1 month (around 3/8/2024) for potassium and BP.    SUBJECTIVE/OBJECTIVE:  HPI  Presents for HTN follow up. Reports taking losartan 50mg daily and HCTZ 25 every other day. Was reducing HCTZ due to leg cramping to see if that was a cause but thinks is due to chronic lower back pain and arthritis/sciatica. May consider following up with ortho.  Highest systolic at home 140's but not checking regularly.

## 2024-03-05 ENCOUNTER — HOSPITAL ENCOUNTER (OUTPATIENT)
Age: 69
Discharge: HOME | End: 2024-03-05
Payer: MEDICARE

## 2024-03-05 DIAGNOSIS — I10 ESSENTIAL HYPERTENSION: ICD-10-CM

## 2024-03-05 DIAGNOSIS — M25.551 BILATERAL HIP PAIN: ICD-10-CM

## 2024-03-05 DIAGNOSIS — E04.9 ENLARGED THYROID: ICD-10-CM

## 2024-03-05 DIAGNOSIS — M25.552 BILATERAL HIP PAIN: ICD-10-CM

## 2024-03-05 DIAGNOSIS — E87.6 HYPOKALEMIA: ICD-10-CM

## 2024-03-05 LAB
ANION GAP SERPL CALC-SCNC: 9 MEQ/L (ref 8–16)
BUN SERPL-MCNC: 8 MG/DL (ref 7–22)
CALCIUM SERPL-MCNC: 9.3 MG/DL (ref 8.5–10.5)
CHLORIDE SERPL-SCNC: 102 MEQ/L (ref 98–111)
CO2 SERPL-SCNC: 28 MEQ/L (ref 23–33)
CREAT SERPL-MCNC: 0.7 MG/DL (ref 0.4–1.2)
FERRITIN SERPL IA-MCNC: 158 NG/ML (ref 10–291)
GFR SERPL CREATININE-BSD FRML MDRD: > 60 ML/MIN/1.73M2
GLUCOSE SERPL-MCNC: 93 MG/DL (ref 70–108)
MAGNESIUM SERPL-MCNC: 2.3 MG/DL (ref 1.6–2.4)
POTASSIUM SERPL-SCNC: 3.7 MEQ/L (ref 3.5–5.2)
SODIUM SERPL-SCNC: 139 MEQ/L (ref 135–145)
TSH SERPL DL<=0.005 MIU/L-ACNC: 2.4 UIU/ML (ref 0.4–4.2)

## 2024-03-05 PROCEDURE — 83735 ASSAY OF MAGNESIUM: CPT

## 2024-03-05 PROCEDURE — 84443 ASSAY THYROID STIM HORMONE: CPT

## 2024-03-05 PROCEDURE — 82728 ASSAY OF FERRITIN: CPT

## 2024-03-05 PROCEDURE — 80048 BASIC METABOLIC PNL TOTAL CA: CPT

## 2024-03-05 PROCEDURE — 36415 COLL VENOUS BLD VENIPUNCTURE: CPT

## 2024-03-07 ENCOUNTER — TELEPHONE (OUTPATIENT)
Dept: FAMILY MEDICINE CLINIC | Age: 69
End: 2024-03-07

## 2024-03-07 NOTE — TELEPHONE ENCOUNTER
Called and spoke to patient, went over lab results/note from Dr. Alfredo. Patient voiced understanding.

## 2024-03-07 NOTE — TELEPHONE ENCOUNTER
----- Message from Marshall Nazario MD sent at 3/6/2024  1:04 PM EST -----  Blood work all looks really good.   Ferritin, Thyroid, kidney function, electrolytes, magnesium all normal.   Hope all is well,   Electronically signed by Marshall Nazario MD on 3/6/2024 at 12:59 PM

## 2024-03-11 ENCOUNTER — OFFICE VISIT (OUTPATIENT)
Dept: FAMILY MEDICINE CLINIC | Age: 69
End: 2024-03-11
Payer: MEDICARE

## 2024-03-11 VITALS
HEART RATE: 55 BPM | TEMPERATURE: 97.5 F | DIASTOLIC BLOOD PRESSURE: 70 MMHG | HEIGHT: 61 IN | BODY MASS INDEX: 35.87 KG/M2 | SYSTOLIC BLOOD PRESSURE: 128 MMHG | RESPIRATION RATE: 10 BRPM | WEIGHT: 190 LBS | OXYGEN SATURATION: 98 %

## 2024-03-11 DIAGNOSIS — I10 ESSENTIAL HYPERTENSION: Primary | ICD-10-CM

## 2024-03-11 DIAGNOSIS — M47.26 OSTEOARTHRITIS OF SPINE WITH RADICULOPATHY, LUMBAR REGION: ICD-10-CM

## 2024-03-11 DIAGNOSIS — M25.551 BILATERAL HIP PAIN: ICD-10-CM

## 2024-03-11 DIAGNOSIS — E87.6 HYPOKALEMIA: ICD-10-CM

## 2024-03-11 DIAGNOSIS — E04.9 ENLARGED THYROID: ICD-10-CM

## 2024-03-11 DIAGNOSIS — M25.552 BILATERAL HIP PAIN: ICD-10-CM

## 2024-03-11 DIAGNOSIS — M51.9 LUMBAR DISC DISEASE: ICD-10-CM

## 2024-03-11 PROCEDURE — 1123F ACP DISCUSS/DSCN MKR DOCD: CPT | Performed by: STUDENT IN AN ORGANIZED HEALTH CARE EDUCATION/TRAINING PROGRAM

## 2024-03-11 PROCEDURE — 3078F DIAST BP <80 MM HG: CPT | Performed by: STUDENT IN AN ORGANIZED HEALTH CARE EDUCATION/TRAINING PROGRAM

## 2024-03-11 PROCEDURE — 3074F SYST BP LT 130 MM HG: CPT | Performed by: STUDENT IN AN ORGANIZED HEALTH CARE EDUCATION/TRAINING PROGRAM

## 2024-03-11 PROCEDURE — 99214 OFFICE O/P EST MOD 30 MIN: CPT | Performed by: STUDENT IN AN ORGANIZED HEALTH CARE EDUCATION/TRAINING PROGRAM

## 2024-03-11 RX ORDER — PRASTERONE (DHEA) 25 MG
1 CAPSULE ORAL EVERY OTHER DAY
COMMUNITY

## 2024-03-11 ASSESSMENT — PATIENT HEALTH QUESTIONNAIRE - PHQ9
2. FEELING DOWN, DEPRESSED OR HOPELESS: 0
SUM OF ALL RESPONSES TO PHQ QUESTIONS 1-9: 0
SUM OF ALL RESPONSES TO PHQ9 QUESTIONS 1 & 2: 0
SUM OF ALL RESPONSES TO PHQ QUESTIONS 1-9: 0
1. LITTLE INTEREST OR PLEASURE IN DOING THINGS: 0

## 2024-03-11 ASSESSMENT — ENCOUNTER SYMPTOMS: BACK PAIN: 1

## 2024-03-11 NOTE — PATIENT INSTRUCTIONS
Thank you   Thank you for trusting us with your healthcare needs. You may receive a survey regarding today's visit. It would help us out if you would take a few moments to provide your feedback. We value your input.  Please bring in ALL medications BOTTLES, including inhalers, herbal supplements, over the counter, prescribed & non-prescribed medicine. The office would like actual medication bottles and a list.   Please note our OFFICE POLICIES:   A.  While Dr. Prater is in [place], you may have a Video Visit with him, as long as you are in the state of [place] or [place].  You must have a PCP listed on your account.  If you do not,you will not be able to have an appointment virtually with him.  B.  Dr. Prater is only licensed in [place] and [place].  You must be in one of these states to do a video visit.  C.  We are unable to change a diagnosis AFTER the test has been performed.          4.  Prior to getting your labs drawn, check with your insurance company for benefits and eligibility of lab services.  Often, insurance companies cover certain tests for preventative visits only.  It is patient's responsibility to    see what is covered.    5.  If the list below has been completed, PLEASE FAX RECORDS TO OUR OFFICE @ [phone]. Once the records have been received we will update your records at our office:  Health Maintenance Due   Topic Date Due    DTaP/Tdap/Td vaccine (1 - Tdap) Never done    Respiratory Syncytial Virus (RSV) Pregnant or age 60 yrs+ (1 - 1-dose 60+ series) Never done    Pneumococcal 65+ years Vaccine (2 - PPSV23 or PCV20) 01/25/2021    Flu vaccine (1) 08/01/2023    COVID-19 Vaccine (3 - 2023-24 season) 09/01/2023    Annual Wellness Visit (Medicare Advantage)  01/01/2024

## 2024-03-11 NOTE — PROGRESS NOTES
[facility] PROFESSIONAL [facility]  [address]  [facility] [address]  Dept: [phone]  Loc: [phone]      Petra Felix (:  1955) is a 68 y.o. female,Established patient, here for evaluation of the following chief complaint(s):  Hypertension (And potassium levels)      ASSESSMENT/PLAN:  1. Essential hypertension  2. Hypokalemia  3. Osteoarthritis of spine with radiculopathy, lumbar region  -     diclofenac sodium (VOLTAREN) 1 % GEL; Apply 4 g topically 4 times daily, Topical, 4 TIMES DAILY Starting Mon 3/11/2024, Disp-350 g, R-3, Normal  4. Lumbar disc disease  -     diclofenac sodium (VOLTAREN) 1 % GEL; Apply 4 g topically 4 times daily, Topical, 4 TIMES DAILY Starting Mon 3/11/2024, Disp-350 g, R-3, Normal  5. Bilateral hip pain  -     diclofenac sodium (VOLTAREN) 1 % GEL; Apply 4 g topically 4 times daily, Topical, 4 TIMES DAILY Starting Mon 3/11/2024, Disp-350 g, R-3, Normal  6. Enlarged thyroid  -     US THYROID; Future    BP stable, 128/70 in office. Continue Hyzaar 50-12.5 mg daily  Continue with voltaren gel  Hypokalemia resolved  Stay hydrated with outdoor gardening.   Enlarged thyroid noted in 2018 on physical exam. TSH 2.4 on 3/5/2024 Repeat TSH in 1 year.  Discussed US of thyroid, patient agreeable and will schedule.  Follow-up in 6 months for Medicare annual wellness or sooner if needed.    Return in about 6 months (around 2024) for Medicare AWV.    SUBJECTIVE/OBJECTIVE:  HPI  Patient presents for follow up of HTN and low sodium.  Voltaren gel helps for about 3 hours for hip/SI joint pain.  Has had headaches previously since COVID and now resolved. Still has chronic tinnitus due to listening to loud music as youth, exagerated since COVID but improving some.  No other concerns or complaints.  BP stable at home.    Review of Systems  Constitutional: Negative for chills, diaphoresis and fever.   HENT: Chronic tinnitus. Negative 
[place] (Screen of Drug Use):    1) How many days in the past 12 months have you used drugs other than alcohol? #: 0  (positive if 7 or more)    2) How many days in the past 12 months have you used drugs more than you meant to? #: 0  (positive if 2 or more)    
      Plan    Overall doing well. Improved diet has helped.  Consider US thyroid   Encouraged continued healthy diet and healthy core exercises.     Return in about 6 months (around 9/11/2024) for Medicare AWV.    Orders Placed:  No orders of the defined types were placed in this encounter.    Medications Prescribed:  No orders of the defined types were placed in this encounter.      No future appointments.    Health Maintenance Due   Topic Date Due    DTaP/Tdap/Td vaccine (1 - Tdap) Never done    Respiratory Syncytial Virus (RSV) Pregnant or age 60 yrs+ (1 - 1-dose 60+ series) Never done    Pneumococcal 65+ years Vaccine (2 - PPSV23 or PCV20) 01/25/2021    Flu vaccine (1) 08/01/2023    COVID-19 Vaccine (3 - 2023-24 season) 09/01/2023    Annual Wellness Visit (Medicare Advantage)  01/01/2024         Attending Physician Statement  I have discussed the case, including pertinent history and exam findings with the resident. I also have seen the patient and performed key portions of the examination.  I agree with the documented assessment and plan as documented by the resident.  GE modifier added to this encounter      Ro Pichardo MD 3/11/2024 11:23 AM  
days after to recover. Otherwise, the voltaren gel is working well for her and is giving her about 3 hours of relief at a time.       Review of Systems   Musculoskeletal:  Positive for back pain.   Neurological:  Negative for dizziness, light-headedness and headaches.          Objective     Vitals:    03/11/24 1045   BP: 128/70   Pulse:    Resp:    Temp:    SpO2:       Physical Exam  Constitutional:       Appearance: Normal appearance.   HENT:      Head: Normocephalic.   Eyes:      General: No scleral icterus.        Right eye: No discharge.         Left eye: No discharge.      Conjunctiva/sclera: Conjunctivae normal.   Cardiovascular:      Rate and Rhythm: Normal rate and regular rhythm.      Heart sounds: Normal heart sounds. No murmur heard.     No gallop.   Pulmonary:      Effort: Pulmonary effort is normal.      Breath sounds: Normal breath sounds. No wheezing, rhonchi or rales.   Skin:     General: Skin is warm and dry.   Neurological:      Mental Status: She is alert.      Gait: Gait normal.   Psychiatric:         Mood and Affect: Mood normal.         Behavior: Behavior normal.         Thought Content: Thought content normal.         Judgment: Judgment normal.          An electronic signature was used to authenticate this note.    --Demetra Thakur

## 2024-04-23 DIAGNOSIS — M51.9 LUMBAR DISC DISEASE: ICD-10-CM

## 2024-04-23 DIAGNOSIS — M25.552 BILATERAL HIP PAIN: ICD-10-CM

## 2024-04-23 DIAGNOSIS — M25.551 BILATERAL HIP PAIN: ICD-10-CM

## 2024-04-23 RX ORDER — MELOXICAM 7.5 MG/1
TABLET ORAL
Qty: 180 TABLET | Refills: 3 | OUTPATIENT
Start: 2024-04-23

## 2024-04-23 NOTE — TELEPHONE ENCOUNTER
Patient's last appointment was : 3/11/2024 with our   Patient's next appointment is : Visit date not found  Last refilled on: 02/08/2024  Which pharmacy does the script need sent to: [facility]      Lab Results   Component Value Date    LABA1C 5.3 03/22/2021     Lab Results   Component Value Date    CHOL 162 02/02/2024    TRIG 88 02/02/2024    HDL 59 02/02/2024    LDLCALC 85 02/02/2024     Lab Results   Component Value Date     03/05/2024    K 3.7 03/05/2024     03/05/2024    CO2 28 03/05/2024    BUN 8 03/05/2024    CREATININE 0.7 03/05/2024    GLUCOSE 93 03/05/2024    CALCIUM 9.3 03/05/2024    PROT 7.1 02/02/2024    BILITOT 0.5 02/02/2024    ALKPHOS 93 02/02/2024    AST 15 02/02/2024    ALT 12 02/02/2024    LABGLOM >60 03/05/2024     Lab Results   Component Value Date    TSH 2.400 03/05/2024     Lab Results   Component Value Date    WBC 6.1 02/02/2024    HGB 14.0 02/02/2024    HCT 42.8 02/02/2024    MCV 89.7 02/02/2024     02/02/2024

## 2024-04-23 NOTE — TELEPHONE ENCOUNTER
Covering for Dr Nazario. As discussed with Dr nazario at last visit, patient to continue off of mobic. Thanks.    Electronically signed by Marya Ramos DO on 4/23/2024 at 6:19 PM

## 2024-04-29 ENCOUNTER — HOSPITAL ENCOUNTER (OUTPATIENT)
Dept: ULTRASOUND IMAGING | Age: 69
Discharge: HOME | End: 2024-04-29
Payer: MEDICARE

## 2024-04-29 DIAGNOSIS — E04.9 ENLARGED THYROID: ICD-10-CM

## 2024-04-29 PROCEDURE — 76536 US EXAM OF HEAD AND NECK: CPT

## 2024-04-30 ENCOUNTER — TELEPHONE (OUTPATIENT)
Dept: FAMILY MEDICINE CLINIC | Age: 69
End: 2024-04-30

## 2024-04-30 NOTE — TELEPHONE ENCOUNTER
----- Message from Marshall Nazario MD sent at 4/29/2024  7:09 PM EDT -----  Thank you for getting your thyroid ultrasound completed.  There is a nodule that measures 1X.9X 0.5 cm on the right lower thyroid lobe.  Recommend a follow-up ultrasound in 1 year.  Reported is also a 2 mm calcified nodule in the upper right thyroid lobe.  Thank you,  Electronically signed by Marshall Nazario MD on 4/29/2024 at 7:08 PM

## 2024-05-06 ENCOUNTER — OFFICE VISIT (OUTPATIENT)
Dept: FAMILY MEDICINE CLINIC | Age: 69
End: 2024-05-06
Payer: MEDICARE

## 2024-05-06 VITALS
TEMPERATURE: 97.5 F | WEIGHT: 189 LBS | SYSTOLIC BLOOD PRESSURE: 120 MMHG | OXYGEN SATURATION: 98 % | RESPIRATION RATE: 18 BRPM | DIASTOLIC BLOOD PRESSURE: 78 MMHG | HEIGHT: 61 IN | BODY MASS INDEX: 35.68 KG/M2 | HEART RATE: 70 BPM

## 2024-05-06 DIAGNOSIS — E66.01 SEVERE OBESITY (BMI 35.0-39.9) WITH COMORBIDITY (HCC): Primary | ICD-10-CM

## 2024-05-06 PROCEDURE — 1123F ACP DISCUSS/DSCN MKR DOCD: CPT | Performed by: STUDENT IN AN ORGANIZED HEALTH CARE EDUCATION/TRAINING PROGRAM

## 2024-05-06 PROCEDURE — 3074F SYST BP LT 130 MM HG: CPT | Performed by: STUDENT IN AN ORGANIZED HEALTH CARE EDUCATION/TRAINING PROGRAM

## 2024-05-06 PROCEDURE — 3078F DIAST BP <80 MM HG: CPT | Performed by: STUDENT IN AN ORGANIZED HEALTH CARE EDUCATION/TRAINING PROGRAM

## 2024-05-06 PROCEDURE — 99214 OFFICE O/P EST MOD 30 MIN: CPT | Performed by: STUDENT IN AN ORGANIZED HEALTH CARE EDUCATION/TRAINING PROGRAM

## 2024-05-06 RX ORDER — TIRZEPATIDE 7.5 MG/.5ML
7.5 INJECTION, SOLUTION SUBCUTANEOUS WEEKLY
Qty: 2 ML | Refills: 0 | Status: SHIPPED | OUTPATIENT
Start: 2024-07-06

## 2024-05-06 RX ORDER — TIRZEPATIDE 5 MG/.5ML
5 INJECTION, SOLUTION SUBCUTANEOUS WEEKLY
Qty: 2 ML | Refills: 0 | Status: SHIPPED | OUTPATIENT
Start: 2024-06-06

## 2024-05-06 RX ORDER — TIRZEPATIDE 2.5 MG/.5ML
2.5 INJECTION, SOLUTION SUBCUTANEOUS WEEKLY
Qty: 2 ML | Refills: 0 | Status: SHIPPED | OUTPATIENT
Start: 2024-05-06

## 2024-05-06 NOTE — PROGRESS NOTES
[facility] PROFESSIONAL [facility] MEDICINE PRACTICE  [address]  [facility] [address]  Dept: [phone]  Loc: [phone]      Petra Felix (:  1955) is a 68 y.o. female,Established patient, here for evaluation of the following chief complaint(s):  Discuss Medications (Blood sugar )      ASSESSMENT/PLAN:  1. Severe obesity (BMI 35.0-39.9) with comorbidity (HCC)  -     Tirzepatide-Weight Management (ZEPBOUND) 2.5 MG/0.5ML SOAJ; Inject 2.5 mg into the skin once a week, Disp-2 mL, R-0Normal  -     Tirzepatide-Weight Management (ZEPBOUND) 5 MG/0.5ML SOAJ; Inject 5 mg into the skin once a week, Disp-2 mL, R-0Normal  -     Tirzepatide-Weight Management (ZEPBOUND) 7.5 MG/0.5ML SOAJ; Inject 7.5 mg into the skin once a week, Disp-2 mL, R-0Normal    Chronic and controlled obesity.  Last A1c 5.3  Patient has done well for several years with improved diet and regular exercise without improvement.  Start Zepbound and follow up in 3 months. Side effects discussed.   Continue with healthy diet and regular exercise.    Return in about 3 months (around 2024) for Medicare AWV.    SUBJECTIVE/OBJECTIVE:  HPI  Petra Felix is a 68-year-old female presenting to the clinic with questions about weight loss.  Patient has been overweight since childhood.   Patient has been improving her diet for years, currently been on Mediterranean diet for a while.   Patient has also tried the Nutrisystem and other diets without improvement.   Does daily exercise to include yoga, back exercising and gardening.  Patient is requesting assistance with medication options.    Review of Systems  Constitutional: overweight.   Respiratory: Negative for cough, chest tightness   Cardiovascular: Negative for palpitations and leg swelling.   Gastrointestinal: Negative for abdominal distention, abdominal pain and nausea.   Genitourinary: Negative for difficulty urinating and dysuria.   Musculoskeletal: 
Attending Physician Note    I saw and evaluated the patient, performing the key elements of the service.  I discussed the findings, assessment and plan with the resident and agree with the resident's findings and plan as documented in the resident's note.  GC modifier added.    Brief summary:  Obesity with serious co-morbidity - tirzeapatide.  Titrate upward monthly.  Discussed use and side effects.  Follow up 3 months, sooner if needed.  
Health Maintenance Due   Topic Date Due    DTaP/Tdap/Td vaccine (1 - Tdap) Never done    Respiratory Syncytial Virus (RSV) Pregnant or age 60 yrs+ (1 - 1-dose 60+ series) Never done    Pneumococcal 65+ years Vaccine (2 of 2 - PPSV23 or PCV20) 01/25/2021    COVID-19 Vaccine (3 - 2023-24 season) 09/01/2023    Annual Wellness Visit (Medicare Advantage)  01/01/2024      
HDL 59 02/02/2024    ALT 12 02/02/2024    AST 15 02/02/2024     03/05/2024     03/05/2024    K 3.7 03/05/2024    CREATININE 0.7 03/05/2024    BUN 8 03/05/2024    CO2 28 03/05/2024    TSH 2.400 03/05/2024    LABA1C 5.3 03/22/2021     Most recent Imaging:  US THYROID  Narrative: PROCEDURE: US THYROID    CLINICAL INFORMATION: Enlarged thyroid    COMPARISON: No prior study.    TECHNIQUE: Grayscale and color sonographic imaging of the thyroid gland performed in longitudinal and transverse planes.    FINDINGS:     THYROID SIZE:    The right thyroid lobe measures 4.7 x 1.7 x 1.1 cm.    The left thyroid lobe measures 4.2 x 1.4 x 0.9 cm.    The isthmus measures 2 mm    ECHOTEXTURE: Relatively homogeneous.    RIGHT LOBE NODULES:    R1. A 1 x 0.9 x 0.5 cm hypoechoic nodule is seen in the inferior right thyroid lobe.  Margin: Ill-defined; Taller than wide: No ; Echogenic foci: None. Total points: 4. ACR category: TR 4.    R2. A 2 x 1 x 2 mm calcified nodule is seen in the superior right thyroid lobe.  Margin: Ill-defined; Taller than wide: No ; Echogenic foci: Peripheral calcification. Total points: 5. ACR category: TR 4..    LEFT LOBE NODULES: None    ISTHMUS NODULES: None    CERVICAL LYMPH NODES:   1. There are no pathologically enlarged lymph nodes adjacent to the thyroid gland.  Impression: 1. A 1 x 0.9 x 0.5 cm hypoechoic nodule in the inferior right thyroid lobe is a TR 4 nodule. 1 year follow-up is recommended.  2. A 2 mm calcified nodule is seen in the superior right thyroid lobe.    **ACR TI-RADS Category and recommendations**    TR 1: 0 point. Benign. No FNA   TR 2: 1,2 points-Not suspicious. No FNA   TR 3: 3 points -Mildly suspicion. FNA is recommended for lesions greater than 2.5 cm; follow up if the nodule is greater than or equal to 1.5 cm. Follow-up can be done at 1, 3 and 5 years. Continued follow-up after 5 years can be obtained if there is no   stability in size.  TR 4: 4-6 points: Moderately

## 2024-05-13 ENCOUNTER — TELEPHONE (OUTPATIENT)
Dept: FAMILY MEDICINE CLINIC | Age: 69
End: 2024-05-13

## 2024-05-13 NOTE — TELEPHONE ENCOUNTER
Patient called and stated that her pharmacy won't fill her prescription until the insurance company talks to Dr. Nazario. She stated forms were sent over last Monday by the pharmacy.

## 2024-05-14 NOTE — TELEPHONE ENCOUNTER
I do not see any forms in my box or in the media tab of patient's chart.  Contacted pharmacy and spoke to pharmacist who stated patient needs prior Auth and pharmacist will be faxing the information to our office after completing this phone call.    Will await for fax.  Thank you,  Electronically signed by Marshall Nazario MD on 5/14/2024 at 6:22 PM

## 2024-05-20 ENCOUNTER — TELEPHONE (OUTPATIENT)
Dept: FAMILY MEDICINE CLINIC | Age: 69
End: 2024-05-20

## 2024-05-20 NOTE — TELEPHONE ENCOUNTER
Patients Zepbound needs prior auth. [facility] [employer] needs called to prior auth this medication @ [phone]

## 2024-05-23 NOTE — TELEPHONE ENCOUNTER
Thank you.  This was discussed with patient at last visit that would very likely be out of pocket.  Please let patient know and see if she can utilize the manufacturers coupon or get assistance from her pharmacy.   Thank you,  Electronically signed by Marshall Nazario MD on 5/23/2024 at 10:25 AM

## 2024-05-23 NOTE — TELEPHONE ENCOUNTER
Called patient and advised her of last note left by Dr. Nazario. She stated that insurance will let her apply for coupons once they got the PA and it was denied. She stated she will give them a call tomorrow and will let us know if she needs us

## 2025-03-03 ENCOUNTER — OFFICE VISIT (OUTPATIENT)
Dept: FAMILY MEDICINE CLINIC | Age: 70
End: 2025-03-03

## 2025-03-03 VITALS
SYSTOLIC BLOOD PRESSURE: 112 MMHG | OXYGEN SATURATION: 97 % | BODY MASS INDEX: 34.93 KG/M2 | DIASTOLIC BLOOD PRESSURE: 60 MMHG | HEART RATE: 66 BPM | RESPIRATION RATE: 20 BRPM | WEIGHT: 185 LBS | TEMPERATURE: 98.2 F | HEIGHT: 61 IN

## 2025-03-03 DIAGNOSIS — I10 PRIMARY HYPERTENSION: ICD-10-CM

## 2025-03-03 DIAGNOSIS — E04.1 THYROID NODULE: ICD-10-CM

## 2025-03-03 DIAGNOSIS — Z12.11 ENCOUNTER FOR SCREENING COLONOSCOPY: ICD-10-CM

## 2025-03-03 DIAGNOSIS — Z13.220 LIPID SCREENING: ICD-10-CM

## 2025-03-03 DIAGNOSIS — G89.29 OTHER CHRONIC PAIN: ICD-10-CM

## 2025-03-03 DIAGNOSIS — E87.6 HYPOKALEMIA: ICD-10-CM

## 2025-03-03 DIAGNOSIS — E66.01 SEVERE OBESITY (BMI 35.0-39.9) WITH COMORBIDITY: Primary | ICD-10-CM

## 2025-03-03 DIAGNOSIS — Z71.89 HEARING AID CONSULTATION: ICD-10-CM

## 2025-03-03 DIAGNOSIS — Z91.81 AT LOW RISK FOR FALL: ICD-10-CM

## 2025-03-03 DIAGNOSIS — Z12.31 ENCOUNTER FOR SCREENING MAMMOGRAM FOR BREAST CANCER: ICD-10-CM

## 2025-03-03 DIAGNOSIS — C50.912: ICD-10-CM

## 2025-03-03 DIAGNOSIS — Z00.00 MEDICARE ANNUAL WELLNESS VISIT, SUBSEQUENT: ICD-10-CM

## 2025-03-03 RX ORDER — VITAMIN B COMPLEX
1 CAPSULE ORAL DAILY
COMMUNITY

## 2025-03-03 SDOH — ECONOMIC STABILITY: FOOD INSECURITY: WITHIN THE PAST 12 MONTHS, THE FOOD YOU BOUGHT JUST DIDN'T LAST AND YOU DIDN'T HAVE MONEY TO GET MORE.: NEVER TRUE

## 2025-03-03 SDOH — ECONOMIC STABILITY: FOOD INSECURITY: WITHIN THE PAST 12 MONTHS, YOU WORRIED THAT YOUR FOOD WOULD RUN OUT BEFORE YOU GOT MONEY TO BUY MORE.: NEVER TRUE

## 2025-03-03 ASSESSMENT — PATIENT HEALTH QUESTIONNAIRE - PHQ9
1. LITTLE INTEREST OR PLEASURE IN DOING THINGS: NOT AT ALL
SUM OF ALL RESPONSES TO PHQ QUESTIONS 1-9: 0
SUM OF ALL RESPONSES TO PHQ QUESTIONS 1-9: 0
2. FEELING DOWN, DEPRESSED OR HOPELESS: NOT AT ALL
SUM OF ALL RESPONSES TO PHQ QUESTIONS 1-9: 0
SUM OF ALL RESPONSES TO PHQ QUESTIONS 1-9: 0

## 2025-03-03 NOTE — PROGRESS NOTES
S: 69 y.o. female with   Chief Complaint   Patient presents with    Medicare AWV       HPI: please see resident note for HPI and ROS.    BP Readings from Last 3 Encounters:   03/03/25 112/60   05/06/24 120/78   03/11/24 128/70     Wt Readings from Last 3 Encounters:   03/03/25 83.9 kg (185 lb)   05/06/24 85.7 kg (189 lb)   03/11/24 86.2 kg (190 lb)       O: VS:  height is 1.549 m (5' 1\") and weight is 83.9 kg (185 lb). Her temporal temperature is 98.2 °F (36.8 °C). Her blood pressure is 112/60 and her pulse is 66. Her respiration is 20 and oxygen saturation is 97%.   Physical exam performed by resident physician     Diagnosis Orders   1. Severe obesity (BMI 35.0-39.9) with comorbidity  Basic Metabolic Panel    CBC with Auto Differential      2. Medicare annual wellness visit, subsequent  Lipid Panel      3. Primary hypertension  Albumin/Creatinine Ratio, Urine      4. Thyroid nodule  US THYROID    TSH reflex to FT4      5. Encounter for screening mammogram for breast cancer  [facility]GE [facility] DIGITAL SCREEN BILATERAL      6. Primary malignant neoplasm of left breast without evidence of distant metastasis (HCC)        7. Hypokalemia  Basic Metabolic Panel      8. Lipid screening  Lipid Panel      9. At low risk for fall        10. Hearing aid consultation  External Referral To ENT      11. Encounter for screening colonoscopy  External Referral To Gastroenterology      12. Other chronic pain  [facility] - [facility]'s          Plan:  Please refer to resident note for full plan.    69 year old female presents to the office for medicare annual wellness.chronic conditions reviewed as above, medication reviewed. No concerns at this time.     Thyroid nodule history. Due for repeat ultrasound due to history of tr4 nodule. No concerns.     Chronic back pain requesting physical therapy referral, will pursue and follow up.    Decreased hearing. Will place referral to audiology.    Plan for mammogram with history of breast 
placed today for [facility] gastroenterology.    Other chronic pain.  Chronic lower back pain for which patient takes Aleve and acetaminophen as needed.  Has not previously completed physical therapy.  Physical therapy ordered today.       Return in 6 months (on 9/3/2025).     Subjective   69-year-old female with past medical history of hypertension, breast cancer in 1999, hypokalemia, and thyroid nodule who presented today for annual wellness visit.  Patient reports no vision changes, had eye appointment within the last year with a change in her glasses prescription.  Notes some concern for decreased hearing bilaterally and tinnitus.  Patient is due for breast cancer screening and colon cancer screening.  Patient declines vaccines at this time, states she always gets sick after vaccines.  Notes some increased stressor due to sister with him recently moving back to [place] and having to help her with medical care.  Denies headache, fatigue, chest pain, shortness of breath, cough, abdominal pain, nausea, vomiting, diarrhea, constipation, rash, and peripheral edema.    Patient's complete Health Risk Assessment and screening values have been reviewed and are found in Flowsheets. The following problems were reviewed today and where indicated follow up appointments were made and/or referrals ordered.    Positive Risk Factor Screenings with Interventions:                Abnormal BMI (obese):  Body mass index is 34.96 kg/m². (!) Abnormal  Interventions:  low carbohydrate diet, exercise for at least 150 minutes/week                   3/3/2025     2:06 PM   Amb Fall Risk Assessment and TUG Test   Do you feel unsteady or are you worried about falling?  no   2 or more falls in past year? no   Fall with injury in past year? no              Objective   Vitals:    03/03/25 1412   BP: 112/60   Site: Left Upper Arm   Position: Sitting   Cuff Size: Medium Adult   Pulse: 66   Resp: 20   Temp: 98.2 °F (36.8 °C)   TempSrc: Temporal

## 2025-03-04 NOTE — TELEPHONE ENCOUNTER
LM for pt to call office if interested in SUNI screening. Prescription approved per [facility] Refill Protocol.

## 2025-03-26 ENCOUNTER — LAB (OUTPATIENT)
Dept: LAB | Age: 70
End: 2025-03-26

## 2025-03-26 DIAGNOSIS — E04.1 THYROID NODULE: ICD-10-CM

## 2025-03-26 DIAGNOSIS — Z13.220 LIPID SCREENING: ICD-10-CM

## 2025-03-26 DIAGNOSIS — E87.6 HYPOKALEMIA: ICD-10-CM

## 2025-03-26 DIAGNOSIS — E66.01 SEVERE OBESITY (BMI 35.0-39.9) WITH COMORBIDITY: ICD-10-CM

## 2025-03-26 DIAGNOSIS — I10 PRIMARY HYPERTENSION: ICD-10-CM

## 2025-03-26 DIAGNOSIS — Z00.00 MEDICARE ANNUAL WELLNESS VISIT, SUBSEQUENT: ICD-10-CM

## 2025-03-26 LAB
ANION GAP SERPL CALC-SCNC: 10 MEQ/L (ref 8–16)
BASOPHILS ABSOLUTE: 0.1 THOU/MM3 (ref 0–0.1)
BASOPHILS NFR BLD AUTO: 1.5 %
BUN SERPL-MCNC: 18 MG/DL (ref 8–23)
CALCIUM SERPL-MCNC: 9.9 MG/DL (ref 8.8–10.2)
CHLORIDE SERPL-SCNC: 100 MEQ/L (ref 98–111)
CO2 SERPL-SCNC: 29 MEQ/L (ref 22–29)
CREAT SERPL-MCNC: 0.7 MG/DL (ref 0.5–0.9)
CREAT UR-MCNC: 232 MG/DL
DEPRECATED RDW RBC AUTO: 43.4 FL (ref 35–45)
EOSINOPHIL NFR BLD AUTO: 3.1 %
EOSINOPHILS ABSOLUTE: 0.2 THOU/MM3 (ref 0–0.4)
ERYTHROCYTE [DISTWIDTH] IN BLOOD BY AUTOMATED COUNT: 13.4 % (ref 11.5–14.5)
GFR SERPL CREATININE-BSD FRML MDRD: > 90 ML/MIN/1.73M2
GLUCOSE SERPL-MCNC: 97 MG/DL (ref 74–109)
HCT VFR BLD AUTO: 43.5 % (ref 37–47)
HGB BLD-MCNC: 14.4 GM/DL (ref 12–16)
IMM GRANULOCYTES # BLD AUTO: 0.02 THOU/MM3 (ref 0–0.07)
IMM GRANULOCYTES NFR BLD AUTO: 0.3 %
LYMPHOCYTES ABSOLUTE: 1.8 THOU/MM3 (ref 1–4.8)
LYMPHOCYTES NFR BLD AUTO: 30.8 %
MCH RBC QN AUTO: 29.1 PG (ref 26–33)
MCHC RBC AUTO-ENTMCNC: 33.1 GM/DL (ref 32.2–35.5)
MCV RBC AUTO: 88.1 FL (ref 81–99)
MICROALBUMIN UR-MCNC: 2.11 MG/DL
MICROALBUMIN/CREAT RATIO PNL UR: 9 MG/G (ref 0–30)
MONOCYTES ABSOLUTE: 0.6 THOU/MM3 (ref 0.4–1.3)
MONOCYTES NFR BLD AUTO: 9.4 %
NEUTROPHILS ABSOLUTE: 3.2 THOU/MM3 (ref 1.8–7.7)
NEUTROPHILS NFR BLD AUTO: 54.9 %
NRBC BLD AUTO-RTO: 0 /100 WBC
PLATELET # BLD AUTO: 303 THOU/MM3 (ref 130–400)
PMV BLD AUTO: 10 FL (ref 9.4–12.4)
POTASSIUM SERPL-SCNC: 3.9 MEQ/L (ref 3.5–5.2)
RBC # BLD AUTO: 4.94 MILL/MM3 (ref 4.2–5.4)
SODIUM SERPL-SCNC: 139 MEQ/L (ref 135–145)
TSH SERPL DL<=0.05 MIU/L-ACNC: 3 UIU/ML (ref 0.27–4.2)
WBC # BLD AUTO: 5.9 THOU/MM3 (ref 4.8–10.8)

## 2025-03-27 LAB
CHOLEST SERPL-MCNC: 178 MG/DL (ref 100–199)
HDLC SERPL-MCNC: 65 MG/DL
LDLC SERPL CALC-MCNC: 100 MG/DL
TRIGL SERPL-MCNC: 63 MG/DL (ref 0–199)

## 2025-04-01 ENCOUNTER — RESULTS FOLLOW-UP (OUTPATIENT)
Dept: FAMILY MEDICINE CLINIC | Age: 70
End: 2025-04-01

## 2025-04-01 NOTE — TELEPHONE ENCOUNTER
----- Message from Dr. Gillian Tariq MD sent at 4/1/2025  8:27 AM EDT -----   Please advise patient that all labs are within normal limits. If she has any questions she may leave a message or discuss during next appointment! Thank you!

## 2025-04-18 DIAGNOSIS — I10 ESSENTIAL HYPERTENSION: ICD-10-CM

## 2025-04-18 NOTE — TELEPHONE ENCOUNTER
Patient's last appointment was: 3/3/2025  with our   Patient's next appointment is: Visit date not found    Last refilled on: 2/8/2024  Which pharmacy does the script need sent to: [facility]       Lab Results   Component Value Date    LABA1C 5.3 03/22/2021     Lab Results   Component Value Date    CHOL 178 03/26/2025    TRIG 63 03/26/2025    HDL 65 03/26/2025     Lab Results   Component Value Date     03/26/2025    K 3.9 03/26/2025     03/26/2025    CO2 29 03/26/2025    BUN 18 03/26/2025    CREATININE 0.7 03/26/2025    GLUCOSE 97 03/26/2025    CALCIUM 9.9 03/26/2025    BILITOT 0.5 02/02/2024    ALKPHOS 93 02/02/2024    AST 15 02/02/2024    ALT 12 02/02/2024    LABGLOM > 90 03/26/2025     Lab Results   Component Value Date    TSH 3.00 03/26/2025     Lab Results   Component Value Date    WBC 5.9 03/26/2025    HGB 14.4 03/26/2025    HCT 43.5 03/26/2025    MCV 88.1 03/26/2025     03/26/2025

## 2025-04-19 RX ORDER — LOSARTAN POTASSIUM AND HYDROCHLOROTHIAZIDE 12.5; 5 MG/1; MG/1
1 TABLET ORAL DAILY
Qty: 30 TABLET | Refills: 3 | Status: SHIPPED | OUTPATIENT
Start: 2025-04-19